# Patient Record
Sex: FEMALE | Race: OTHER | Employment: FULL TIME | URBAN - METROPOLITAN AREA
[De-identification: names, ages, dates, MRNs, and addresses within clinical notes are randomized per-mention and may not be internally consistent; named-entity substitution may affect disease eponyms.]

---

## 2019-04-27 RX ORDER — AZITHROMYCIN 250 MG/1
250 TABLET, FILM COATED ORAL SEE ADMIN INSTRUCTIONS
Qty: 6 TABLET | Refills: 0 | Status: SHIPPED | OUTPATIENT
Start: 2019-04-27 | End: 2019-05-02

## 2019-12-14 ENCOUNTER — OFFICE VISIT (OUTPATIENT)
Dept: FAMILY MEDICINE CLINIC | Age: 44
End: 2019-12-14

## 2019-12-14 VITALS
DIASTOLIC BLOOD PRESSURE: 97 MMHG | WEIGHT: 209 LBS | HEIGHT: 62 IN | BODY MASS INDEX: 38.46 KG/M2 | HEART RATE: 72 BPM | OXYGEN SATURATION: 97 % | TEMPERATURE: 98.1 F | SYSTOLIC BLOOD PRESSURE: 149 MMHG

## 2019-12-14 DIAGNOSIS — S49.91XA INJURY OF RIGHT SHOULDER, INITIAL ENCOUNTER: Primary | ICD-10-CM

## 2019-12-14 PROCEDURE — 99212 OFFICE O/P EST SF 10 MIN: CPT | Performed by: NURSE PRACTITIONER

## 2019-12-14 RX ORDER — CYCLOBENZAPRINE HCL 10 MG
10 TABLET ORAL NIGHTLY PRN
Qty: 10 TABLET | Refills: 0 | Status: SHIPPED | OUTPATIENT
Start: 2019-12-14 | End: 2019-12-24

## 2019-12-14 ASSESSMENT — ENCOUNTER SYMPTOMS
NAUSEA: 0
SORE THROAT: 0
DIARRHEA: 0
ALLERGIC/IMMUNOLOGIC NEGATIVE: 1
EYE DISCHARGE: 0
CHEST TIGHTNESS: 0
EYE ITCHING: 0
COUGH: 0
EYES NEGATIVE: 1
VOMITING: 0
ABDOMINAL PAIN: 0
SHORTNESS OF BREATH: 0

## 2020-01-03 ENCOUNTER — OFFICE VISIT (OUTPATIENT)
Dept: ORTHOPEDIC SURGERY | Age: 45
End: 2020-01-03
Payer: COMMERCIAL

## 2020-01-03 VITALS — BODY MASS INDEX: 38.46 KG/M2 | HEIGHT: 62 IN | WEIGHT: 209 LBS

## 2020-01-03 PROCEDURE — 99203 OFFICE O/P NEW LOW 30 MIN: CPT | Performed by: ORTHOPAEDIC SURGERY

## 2020-01-03 NOTE — PROGRESS NOTES
Orthopedic Shoulder Encounter Note     Chief complaint: Right shoulder pain    HPI: Geri Scott is a 53 y.o.  right-hand dominant female who presents for evaluation of her right shoulder which is been hurting for the past 2 weeks. She believes that on 12/13/2019 she hurt the shoulder while she was trying to load up a wooden table onto the back of a truck. She states that the table in particular was pretty heavy. She attempted to lift this table over her shoulder and felt a painful crunch in the shoulder with immediate pain. She had difficulty lifting up her arm initially and has been working on this on her own at home with some improvement but still has pain lifting up her arm and also reaching up to her back. Her pain diffusely involves the shoulder. 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 Her pain is particularly bothersome at nighttime. Of note she does report a history of a left shoulder rotator cuff tear but apparently was not fixed at the time because she was dealing with a frozen shoulder. It is not clear as to whether or not she did in fact have a full-thickness tear involving the rotator cuff. Previous treatment:    NSAIDs: None    Physical Therapy:  No    Injections: None    Surgeries: None    Review of Systems:     Constitution: no fever or chills   Pain level: 6/10  Musculoskeletal: As noted in the HPI   Neurologic: no neurologic symptoms    Past Medical History  Luis Miguel Gray  has a past medical history of Anxiety, Lumbar disc disease, and Rotator cuff tear. Past Surgical History  Luis Miguel Gray  has a past surgical history that includes Lumbar disc surgery. Current Medications  Current Outpatient Medications   Medication Sig Dispense Refill    naproxen (NAPROSYN) 500 MG tablet Take 1 tablet by mouth 2 times daily (with meals) for 14 days.  (Patient not taking: Reported on 12/14/2019) 28 tablet 0    Multiple Vitamins-Minerals (MULTIVITAMIN PO) Take 1 tablet by mouth daily. No current facility-administered medications for this visit. Allergies  Allergies have been reviewed. Sol Dumont is allergic to bactrim [sulfamethoxazole-trimethoprim]; ibuprofen; and other. Social History  Annalisa  reports that she has never smoked. She has never used smokeless tobacco. She reports current alcohol use. She reports that she does not use drugs. Family History  Annalisa's family history includes Cancer (age of onset: 72) in her maternal grandmother; Diabetes in her paternal grandmother; Diabetes (age of onset: 6) in her brother; Diabetes (age of onset: 5) in her sister; High Blood Pressure in her mother; High Cholesterol in her father.      Physical Exam:     Ht 5' 2\" (1.575 m)   Wt 209 lb (94.8 kg)   BMI 38.23 kg/m²    General Appearance: alert, well appearing, and in no distress  Mental Status: alert, oriented to person, place, and time  Gait: normal    Shoulder:    Skin: warm and dry, no rash or erythema; no swelling or obvious muscular atrophy  Vasculature: 2+ radial pulses bilaterally  Neuro: Sensation grossly intact to light touch diffusely  Tenderness: Tender to palpation over the anterior and anterolateral aspect of the right shoulder    ROM: (Degrees)    Right   A P   Left   A P    Elevation  105    Elevation  125   Abduction  75    Abduction  140    ER   40    ER   60   IR   L5    IR   T12   90 abd/ER      90 abd/ER     90 abd/IR      90 abd/IR     Crepitation  No    Crepitation  No  Dyskenesia  No    Dyskenesia  No      Muscle strength:    Right       Left    Deltoid   5    Deltoid   5  Supraspinatus  4    Supraspinatus  5  ER   5    ER   5  IR   5    IR   5    Special tests    Right   Rotator Cuff    Left    y   Painful arc    n   y   Pain with ER    n    y   Neer's     n    y   Hawkin's    n    n   Drop Arm    n  n   Lift off/Belly Press   n  n   ER Lag    n          [facility] Joint  n   AC tenderness   n  n   Cross-chest

## 2020-01-09 ENCOUNTER — HOSPITAL ENCOUNTER (OUTPATIENT)
Dept: MRI IMAGING | Age: 45
Discharge: HOME | End: 2020-01-11
Payer: COMMERCIAL

## 2020-01-09 PROCEDURE — 73221 MRI JOINT UPR EXTREM W/O DYE: CPT

## 2020-01-10 ENCOUNTER — TELEPHONE (OUTPATIENT)
Dept: ORTHOPEDIC SURGERY | Age: 45
End: 2020-01-10

## 2020-01-13 ENCOUNTER — TELEPHONE (OUTPATIENT)
Dept: ORTHOPEDIC SURGERY | Age: 45
End: 2020-01-13

## 2020-01-14 NOTE — TELEPHONE ENCOUNTER
Please inform Ms. Claire I've reviewed her MRI study. It demonstrates a full thickness RTC tear. I'd like to see her back to discuss treatment including surgery.

## 2020-01-20 ENCOUNTER — OFFICE VISIT (OUTPATIENT)
Dept: ORTHOPEDIC SURGERY | Age: 45
End: 2020-01-20
Payer: COMMERCIAL

## 2020-01-20 VITALS — WEIGHT: 209 LBS | HEIGHT: 62 IN | BODY MASS INDEX: 38.46 KG/M2

## 2020-01-20 PROBLEM — M75.21 RIGHT BICIPITAL TENOSYNOVITIS: Status: ACTIVE | Noted: 2020-01-20

## 2020-01-20 PROCEDURE — 99213 OFFICE O/P EST LOW 20 MIN: CPT | Performed by: ORTHOPAEDIC SURGERY

## 2020-01-20 NOTE — PROGRESS NOTES
HPI: Ms. Luisa Patterson is here today to review the results of her right shoulder MRI study which was completed on 1/9/2020. I did review the images with the patient today which demonstrates a full-thickness tear of the supraspinatus with retraction to about the level of the mid humeral head. There is some thinning of the rotator cuff as you go posteriorly into the supraspinatus tendon with some tendinopathy. There is also thickening of the biceps tendon within the groove associated with surrounding edema. There is irregularity involving the proximal subscapularis suggestive of possible tear. I had a discussion with the patient today with regards to the nature and extension of her injury. I did educate her on the natural history of a full-thickness rotator cuff tear. We discussed treatment options including nonoperative and operative intervention. Given the patient's age and activity level she is inclined to forego attempts at conservative management and proceed with surgical intervention by way of an arthroscopic rotator cuff repair with possible biceps tenodesis. We discussed the possibility of performing this subpectorally if necessary. We discussed the d Risks as discussed included but were not limited to risk of infection, wound healing problems, stiffness, progressive arthritis, persistent pain, re-tear of the rotator cuff, failure of the rotator cuff repair to heal, suture and/or anchor related problems, vascular injury, excessive bleeding, temporary and/or permanent nerve injury, medical risks including DVT, PE, and stroke, and risk of anesthesia. She demonstrated a good understanding of our discussion and at this time would like to proceed with surgical intervention as outlined above. We will facilitate her getting cleared for surgery preoperatively and we'll schedule her for surgery at her convenience.   All questions were appropriately answered however she was encouraged to return or call prior to surgery or any other upcoming appointments with additional questions and/or concerns. I have spent 15 minutes face-to-face with the patient more than 50% of this time was spent counseling and coordinating care as outlined above.

## 2020-01-22 ENCOUNTER — OFFICE VISIT (OUTPATIENT)
Dept: INTERNAL MEDICINE CLINIC | Age: 45
End: 2020-01-22
Payer: COMMERCIAL

## 2020-01-22 VITALS
DIASTOLIC BLOOD PRESSURE: 74 MMHG | WEIGHT: 210 LBS | HEIGHT: 62 IN | OXYGEN SATURATION: 98 % | SYSTOLIC BLOOD PRESSURE: 122 MMHG | BODY MASS INDEX: 38.64 KG/M2 | HEART RATE: 72 BPM

## 2020-01-22 PROBLEM — Z12.39 BREAST SCREENING: Status: ACTIVE | Noted: 2020-01-22

## 2020-01-22 PROBLEM — E66.9 OBESITY (BMI 30-39.9): Status: ACTIVE | Noted: 2020-01-22

## 2020-01-22 PROCEDURE — 99204 OFFICE O/P NEW MOD 45 MIN: CPT | Performed by: INTERNAL MEDICINE

## 2020-01-22 ASSESSMENT — PATIENT HEALTH QUESTIONNAIRE - PHQ9
SUM OF ALL RESPONSES TO PHQ9 QUESTIONS 1 & 2: 0
1. LITTLE INTEREST OR PLEASURE IN DOING THINGS: 0
SUM OF ALL RESPONSES TO PHQ QUESTIONS 1-9: 0
2. FEELING DOWN, DEPRESSED OR HOPELESS: 0
SUM OF ALL RESPONSES TO PHQ QUESTIONS 1-9: 0

## 2020-01-22 ASSESSMENT — ENCOUNTER SYMPTOMS
TROUBLE SWALLOWING: 0
COLOR CHANGE: 0
WHEEZING: 0
EYE DISCHARGE: 0
SHORTNESS OF BREATH: 0
COUGH: 0
BLOOD IN STOOL: 0
DIARRHEA: 0
ABDOMINAL DISTENTION: 0
EYE PAIN: 0

## 2020-01-22 NOTE — PROGRESS NOTES
Hematological: Negative for adenopathy. Does not bruise/bleed easily. Psychiatric/Behavioral: Negative for behavioral problems and sleep disturbance. Objective:     Physical Exam  Constitutional:       Appearance: She is well-developed. She is not diaphoretic. Comments: obese   HENT:      Head: Normocephalic and atraumatic. Eyes:      General:         Right eye: No discharge. Left eye: No discharge. Extraocular Movements:      Right eye: Normal extraocular motion. Left eye: Normal extraocular motion. Conjunctiva/sclera: Conjunctivae normal.      Right eye: Right conjunctiva is not injected. Left eye: Left conjunctiva is not injected. Neck:      Musculoskeletal: Normal range of motion and neck supple. No edema or erythema. Thyroid: No thyroid mass or thyromegaly. Vascular: No JVD. Cardiovascular:      Rate and Rhythm: Normal rate and regular rhythm. Heart sounds: No murmur. No friction rub. Pulmonary:      Effort: Pulmonary effort is normal. No tachypnea, bradypnea, accessory muscle usage or respiratory distress. Breath sounds: Normal breath sounds. No wheezing or rales. Abdominal:      General: Bowel sounds are normal. There is no distension. Palpations: Abdomen is soft. Tenderness: There is no tenderness. There is no rebound. Musculoskeletal: Normal range of motion. General: No tenderness. Lymphadenopathy:      Head:      Right side of head: No submental or submandibular adenopathy. Left side of head: No submental or submandibular adenopathy. Cervical: No cervical adenopathy. Skin:     General: Skin is warm. Coloration: Skin is not pale. Findings: No bruising, ecchymosis or rash. Neurological:      Mental Status: She is alert and oriented to person, place, and time. Cranial Nerves: No cranial nerve deficit. Sensory: No sensory deficit. Motor: No atrophy or abnormal muscle tone. Coordination: Coordination normal.   Psychiatric:         Mood and Affect: Mood is not anxious. Affect is not angry. Speech: Speech is not slurred. Behavior: Behavior normal. Behavior is not aggressive. Thought Content: Thought content does not include homicidal ideation. Cognition and Memory: Memory is not impaired. /74   Pulse 72   Ht 5' 2.01\" (1.575 m)   Wt 210 lb (95.3 kg)   SpO2 98%   BMI 38.40 kg/m²     Assessment:          Diagnosis Orders   1. Injury of right rotator cuff, initial encounter     2. Screening for hyperlipidemia  Lipid, Fasting   3. Encounter for screening for diabetes mellitus  Glucose, Fasting    Hemoglobin A1C - NOT COVERED /DO NOT USE FOR MEDICARE PATIENTS   4. Breast screening               Plan:      No follow-ups on file. No orders of the defined types were placed in this encounter. No orders of the defined types were placed in this encounter. clear for surg intermediate risk  For right shoulder repair     Patient given educational materials - see patient instructions. Discussed use, benefit, and side effects of prescribed medications. All patientquestions answered. Pt voiced understanding. Reviewed health maintenance. Instructedto continue current medications, diet and exercise. Patient agreed with treatmentplan. Follow up as directed. Electronically signed by Wolf Daniels MD on 1/22/2020 at 12:03 PM we last saw you? No  Have you had your routine dental cleaning in the past 6 months? yes -     Have you activated your [employer] account? If not, what are your barriers?  Yes     Patient Care Team:  Wolf Daniels MD as PCP - General (Internal Medicine)    Medical History Review  Past Medical, Family, and Social History reviewed and does not contribute to the patient presenting condition    Health Maintenance   Topic Date Due    DTaP/Tdap/Td vaccine (1 - Tdap) 06/10/1984    HIV screen  06/10/1988    Cervical cancer

## 2020-01-24 ENCOUNTER — HOSPITAL ENCOUNTER (OUTPATIENT)
Age: 45
Discharge: HOME | End: 2020-01-24
Payer: COMMERCIAL

## 2020-01-24 ENCOUNTER — HOSPITAL ENCOUNTER (OUTPATIENT)
Dept: PREADMISSION TESTING | Age: 45
End: 2020-01-24
Payer: COMMERCIAL

## 2020-01-24 LAB
ABSOLUTE EOS #: 0.1 K/UL (ref 0–0.4)
ABSOLUTE IMMATURE GRANULOCYTE: ABNORMAL K/UL (ref 0–0.3)
ABSOLUTE LYMPH #: 1.8 K/UL (ref 1–4.8)
ABSOLUTE MONO #: 0.7 K/UL (ref 0.1–1.3)
ALBUMIN SERPL-MCNC: 4 G/DL (ref 3.5–5.2)
ALBUMIN/GLOBULIN RATIO: ABNORMAL (ref 1–2.5)
ALP BLD-CCNC: 49 U/L (ref 35–104)
ALT SERPL-CCNC: 31 U/L (ref 5–33)
ANION GAP SERPL CALCULATED.3IONS-SCNC: 11 MMOL/L (ref 9–17)
AST SERPL-CCNC: 23 U/L
BASOPHILS # BLD: 1 % (ref 0–2)
BASOPHILS ABSOLUTE: 0.1 K/UL (ref 0–0.2)
BILIRUB SERPL-MCNC: 0.22 MG/DL (ref 0.3–1.2)
BUN BLDV-MCNC: 13 MG/DL (ref 6–20)
BUN/CREAT BLD: ABNORMAL (ref 9–20)
CALCIUM SERPL-MCNC: 9.3 MG/DL (ref 8.6–10.4)
CHLORIDE BLD-SCNC: 104 MMOL/L (ref 98–107)
CHOLESTEROL/HDL RATIO: 3.8
CHOLESTEROL: 160 MG/DL
CO2: 24 MMOL/L (ref 20–31)
CREAT SERPL-MCNC: 0.66 MG/DL (ref 0.5–0.9)
CREATININE URINE: 112.2 MG/DL (ref 28–217)
DIFFERENTIAL TYPE: ABNORMAL
EOSINOPHILS RELATIVE PERCENT: 2 % (ref 0–4)
ESTIMATED AVERAGE GLUCOSE: 111 MG/DL
GFR AFRICAN AMERICAN: >60 ML/MIN
GFR NON-AFRICAN AMERICAN: >60 ML/MIN
GFR SERPL CREATININE-BSD FRML MDRD: ABNORMAL ML/MIN/{1.73_M2}
GFR SERPL CREATININE-BSD FRML MDRD: ABNORMAL ML/MIN/{1.73_M2}
GLUCOSE BLD-MCNC: 121 MG/DL (ref 70–99)
HBA1C MFR BLD: 5.5 % (ref 4–6)
HCT VFR BLD CALC: 41.9 % (ref 36–46)
HDLC SERPL-MCNC: 42 MG/DL
HEMOGLOBIN: 13.7 G/DL (ref 12–16)
IMMATURE GRANULOCYTES: ABNORMAL %
LDL CHOLESTEROL: 93 MG/DL (ref 0–130)
LYMPHOCYTES # BLD: 28 % (ref 24–44)
MCH RBC QN AUTO: 29.6 PG (ref 26–34)
MCHC RBC AUTO-ENTMCNC: 32.6 G/DL (ref 31–37)
MCV RBC AUTO: 90.7 FL (ref 80–100)
MICROALBUMIN/CREAT 24H UR: <12 MG/L
MICROALBUMIN/CREAT UR-RTO: NORMAL MCG/MG CREAT
MONOCYTES # BLD: 10 % (ref 1–7)
NRBC AUTOMATED: ABNORMAL PER 100 WBC
PDW BLD-RTO: 13.8 % (ref 11.5–14.9)
PLATELET # BLD: 293 K/UL (ref 150–450)
PLATELET ESTIMATE: ABNORMAL
PMV BLD AUTO: 8.1 FL (ref 6–12)
POTASSIUM SERPL-SCNC: 4.4 MMOL/L (ref 3.7–5.3)
RBC # BLD: 4.62 M/UL (ref 4–5.2)
RBC # BLD: ABNORMAL 10*6/UL
SEG NEUTROPHILS: 59 % (ref 36–66)
SEGMENTED NEUTROPHILS ABSOLUTE COUNT: 3.8 K/UL (ref 1.3–9.1)
SODIUM BLD-SCNC: 139 MMOL/L (ref 135–144)
TOTAL PROTEIN: 6.8 G/DL (ref 6.4–8.3)
TRIGL SERPL-MCNC: 124 MG/DL
TSH SERPL DL<=0.05 MIU/L-ACNC: 3.05 MIU/L (ref 0.3–5)
VLDLC SERPL CALC-MCNC: NORMAL MG/DL (ref 1–30)
WBC # BLD: 6.5 K/UL (ref 3.5–11)
WBC # BLD: ABNORMAL 10*3/UL

## 2020-01-24 PROCEDURE — 82570 ASSAY OF URINE CREATININE: CPT

## 2020-01-24 PROCEDURE — 83036 HEMOGLOBIN GLYCOSYLATED A1C: CPT

## 2020-01-24 PROCEDURE — 85025 COMPLETE CBC W/AUTO DIFF WBC: CPT

## 2020-01-24 PROCEDURE — 80053 COMPREHEN METABOLIC PANEL: CPT

## 2020-01-24 PROCEDURE — 82043 UR ALBUMIN QUANTITATIVE: CPT

## 2020-01-24 PROCEDURE — 80061 LIPID PANEL: CPT

## 2020-01-24 PROCEDURE — 84443 ASSAY THYROID STIM HORMONE: CPT

## 2020-01-24 PROCEDURE — 36415 COLL VENOUS BLD VENIPUNCTURE: CPT

## 2020-01-31 ENCOUNTER — HOSPITAL ENCOUNTER (OUTPATIENT)
Dept: PREADMISSION TESTING | Age: 45
Discharge: HOME | End: 2020-02-04
Payer: COMMERCIAL

## 2020-01-31 ENCOUNTER — OFFICE VISIT (OUTPATIENT)
Dept: ORTHOPEDIC SURGERY | Age: 45
End: 2020-01-31
Payer: COMMERCIAL

## 2020-01-31 VITALS
SYSTOLIC BLOOD PRESSURE: 138 MMHG | HEART RATE: 88 BPM | TEMPERATURE: 98 F | OXYGEN SATURATION: 99 % | RESPIRATION RATE: 20 BRPM | BODY MASS INDEX: 38.46 KG/M2 | HEIGHT: 62 IN | DIASTOLIC BLOOD PRESSURE: 91 MMHG | WEIGHT: 209 LBS

## 2020-01-31 VITALS — WEIGHT: 210 LBS | HEIGHT: 62 IN | BODY MASS INDEX: 38.64 KG/M2

## 2020-01-31 PROCEDURE — 99212 OFFICE O/P EST SF 10 MIN: CPT | Performed by: ORTHOPAEDIC SURGERY

## 2020-01-31 RX ORDER — ONDANSETRON 4 MG/1
4 TABLET, FILM COATED ORAL DAILY PRN
Qty: 20 TABLET | Refills: 0 | Status: SHIPPED | OUTPATIENT
Start: 2020-01-31 | End: 2020-06-24 | Stop reason: ALTCHOICE

## 2020-01-31 RX ORDER — SODIUM CHLORIDE 0.9 % (FLUSH) 0.9 %
10 SYRINGE (ML) INJECTION PRN
Status: CANCELLED | OUTPATIENT
Start: 2020-01-31

## 2020-01-31 RX ORDER — ACETAMINOPHEN 325 MG/1
1000 TABLET ORAL ONCE
Status: CANCELLED | OUTPATIENT
Start: 2020-01-31 | End: 2020-01-31

## 2020-01-31 RX ORDER — GABAPENTIN 300 MG/1
CAPSULE ORAL
Qty: 7 CAPSULE | Refills: 0 | Status: SHIPPED | OUTPATIENT
Start: 2020-01-31 | End: 2020-06-24

## 2020-01-31 RX ORDER — SODIUM CHLORIDE 0.9 % (FLUSH) 0.9 %
10 SYRINGE (ML) INJECTION EVERY 12 HOURS SCHEDULED
Status: CANCELLED | OUTPATIENT
Start: 2020-01-31

## 2020-01-31 RX ORDER — HYDROCODONE BITARTRATE AND ACETAMINOPHEN 5; 325 MG/1; MG/1
1 TABLET ORAL EVERY 4 HOURS
Qty: 42 TABLET | Refills: 0 | Status: SHIPPED | OUTPATIENT
Start: 2020-01-31 | End: 2020-02-13 | Stop reason: SDUPTHER

## 2020-01-31 NOTE — PROGRESS NOTES
HPI: Ms. Daysi Hernandez is here today for her preoperative visit regarding the right shoulder. She has a full-thickness tear of her supraspinatus and what appears to be a tear involving the subscapularis as well. She has significant pain and dysfunction involving the shoulder and has elected to forego attempts at conservative management proceeding with surgical intervention by way of an arthroscopic rotator cuff repair with possible biceps tenodesis. We once again discussed in detail what surgery would entail in terms of the procedure, postoperative recovery course, and anticipated outcome. She was provided with a prescription for postoperative analgesics. All questions were appropriately answered. She has seen her primary care provider who provided appropriate preoperative medical clearance.

## 2020-01-31 NOTE — H&P
HISTORY and [facility] [address]       NAME:  Abigail Mahmood  MRN: [id number]   YOB: 1975   Date: 1/31/2020   Age: 53 y.o. Gender: female       COMPLAINT AND PRESENT HISTORY:     Abigail Mahmood is 53 y.o.,  female, undergoing preadmission testing for right shoulder rotator cuff tear with scheduled for right shoulder arthroscopy rotator cuff repair possible subpectoral bicep tenodesis with interscalene block and exparel on 02/05/2020. Pt was moving a dining room table on December 13, 2019 and heard a pop, immediately felt pain in right shoulder. She fell down to bed of truck onto her hands which aggravated right shoulder pain further. Pt rates pain 7-8/10 during the day and 10/10 at night. She reports inability to sleep due to pain. Does not take any pain medications to relieve pain. Uses alternating ice/heat with mild relief of pain. There is a cracking/popping with movement. Range of motion to right shoulder is limited due to pain. Right shoulder abduction to 75 degrees, external rotation to 40. No weakness to right arm or  strength. No recent falls or trauma since injury occurred. No redness, rash or swelling. No Hx of MRSA infections in the past.    Denies any current chest pain/pressure, palpitations, SOB, recent URI, nausea, vomiting, diarrhea, constipation, fever or chills. EXAMINATION:  MRI OF THE RIGHT SHOULDER WITHOUT CONTRAST   1/9/2020 5:07 pm  Impression  Rupture of the supraspinatus tendon with approximately 1.5 cm of retraction. High-grade tendinopathy and multifocal intrasubstance tears of the  infraspinatus, subscapularis, and intra-articular biceps tendon.       PAST MEDICAL HISTORY     Past Medical History:   Diagnosis Date    Anxiety 5/3/2013    NO MED STOPPED ZOLOFT DID NOT LIKE SIDE EFFECTS TO FOLLOW WITH DR FOR ALTERNATE MED    Lumbar disc disease     Obesity (BMI 30-39.9) 1/22/2020    Rotator cuff tear 01/19/2013    TO HAVE SURGERY Concern    None   Social History Narrative    None        REVIEW OF SYSTEMS      Allergies   Allergen Reactions    Bactrim [Sulfamethoxazole-Trimethoprim]      anaphalyaxis    Ibuprofen Itching    Other      Ocean perch  What ever they put on it to preserve it        Current Outpatient Medications on File Prior to Encounter   Medication Sig Dispense Refill    HYDROcodone-acetaminophen (NORCO) 5-325 MG per tablet Take 1 tablet by mouth every 4 hours for 42 doses. 42 tablet 0    ondansetron (ZOFRAN) 4 MG tablet Take 1 tablet by mouth daily as needed for Nausea or Vomiting 20 tablet 0    gabapentin (NEURONTIN) 300 MG capsule Take 1 tab, Daily at night 7 capsule 0     No current facility-administered medications on file prior to encounter. General health:  Fairly good. No fever or chills. Skin:  No itching, redness or rash. HEENT:  No headache, epistaxis or sore throat. Neck:  No pain, stiffness or masses. Cardiovascular/Respiratory system:  No chest pain, palpitation or shortness of breath. Gastrointestinal tract: No abdominal pain, Dysphagia, nausea, vomiting, diarrhea or constipation. Genitourinary:  No burning on micturition. No hesitancy, urgency, frequency or discoloration of urine. Locomotor:  See HPI. Neuropsychiatric:  No referable complaints. GENERAL PHYSICAL EXAM:     Vitals: BP (!) 138/91   Pulse 88   Temp 98 °F (36.7 °C)   Resp 20   Ht 5' 2\" (1.575 m)   Wt 209 lb (94.8 kg)   LMP 01/03/2020 (Approximate)   SpO2 99%   Breastfeeding No   BMI 38.23 kg/m²  Body mass index is 38.23 kg/m². GENERAL APPEARANCE:   Maninder Hdz is 53 y.o.,  female, moderately obese, nourished, conscious, alert. Does not appear to be distress or pain at this time. SKIN:  Warm, dry, no cyanosis or jaundice. HEAD:  Normocephalic, atraumatic, no swelling or tenderness. EYES:  Pupils equal, reactive to light. EARS:  No discharge, no marked hearing loss. NOSE:  No rhinorrhea, epistaxis or septal deformity. THROAT:  Not congested. No ulceration bleeding or discharge. NECK:  No stiffness, trachea central.  No palpable masses or L.N.                 CHEST:  Symmetrical and equal on expansion. HEART:  RRR S1 > S2. No audible murmurs or gallops. LUNGS:  Equal on expansion, normal breath sounds. No adventitious sounds. ABDOMEN:  Obese. Soft on palpation. No localized tenderness. No guarding or rigidity. No palpable hepatosplenomegaly. LYMPHATICS:  No palpable cervical lymphadenopathy. LOCOMOTOR, BACK AND SPINE:  No deformities. EXTREMITIES:  Symmetrical, no pretibial edema. No calf tenderness. No discoloration or ulcerations. Right shoulder tenderness to palpation, +crepitus, limited range of motion due to pain, abduction to 75 degrees, ER to 40, radial pulses +2 bilaterally. NEUROLOGIC:  The patient is conscious, alert, oriented, Cranial nerve II-XII intact, taste and smell were not examined. No apparent focal sensory or motor deficits.                                                                                      PROVISIONAL DIAGNOSES / SURGERY:      ROTATOR CUFF TEAR RIGHT SHOULDER    SHOULDER ARTHROSCOPY ROTATOR CUFF REPAIR POSSIBLE SUBPECTORAL BICEP TENODESIS W/INTERSCALENE BLOCK & EXPAREL Right      Patient Active Problem List    Diagnosis Date Noted    Breast screening 01/22/2020    Obesity (BMI 30-39.9) 01/22/2020    Right bicipital tenosynovitis 01/20/2020    Anxiety 05/03/2013    Complete tear of right rotator cuff 05/03/2013    Benign positional vertigo 03/01/2013    Acute bronchitis 03/01/2013    Left shoulder pain 03/01/2013    Injury of left shoulder

## 2020-02-03 ENCOUNTER — ANESTHESIA EVENT (OUTPATIENT)
Dept: OPERATING ROOM | Age: 45
End: 2020-02-03
Payer: COMMERCIAL

## 2020-02-03 RX ORDER — LIDOCAINE HYDROCHLORIDE 10 MG/ML
1 INJECTION, SOLUTION EPIDURAL; INFILTRATION; INTRACAUDAL; PERINEURAL
Status: CANCELLED | OUTPATIENT
Start: 2020-02-03 | End: 2020-02-03

## 2020-02-03 RX ORDER — SODIUM CHLORIDE, SODIUM LACTATE, POTASSIUM CHLORIDE, CALCIUM CHLORIDE 600; 310; 30; 20 MG/100ML; MG/100ML; MG/100ML; MG/100ML
INJECTION, SOLUTION INTRAVENOUS CONTINUOUS
Status: CANCELLED | OUTPATIENT
Start: 2020-02-03

## 2020-02-03 RX ORDER — SODIUM CHLORIDE 0.9 % (FLUSH) 0.9 %
10 SYRINGE (ML) INJECTION EVERY 12 HOURS SCHEDULED
Status: CANCELLED | OUTPATIENT
Start: 2020-02-03

## 2020-02-03 RX ORDER — SODIUM CHLORIDE 0.9 % (FLUSH) 0.9 %
10 SYRINGE (ML) INJECTION PRN
Status: CANCELLED | OUTPATIENT
Start: 2020-02-03

## 2020-02-05 ENCOUNTER — HOSPITAL ENCOUNTER (OUTPATIENT)
Age: 45
Setting detail: OUTPATIENT SURGERY
Discharge: HOME | End: 2020-02-05
Attending: ORTHOPAEDIC SURGERY | Admitting: ORTHOPAEDIC SURGERY
Payer: COMMERCIAL

## 2020-02-05 ENCOUNTER — ANESTHESIA (OUTPATIENT)
Dept: OPERATING ROOM | Age: 45
End: 2020-02-05
Payer: COMMERCIAL

## 2020-02-05 VITALS
DIASTOLIC BLOOD PRESSURE: 71 MMHG | RESPIRATION RATE: 19 BRPM | HEART RATE: 55 BPM | TEMPERATURE: 98.1 F | BODY MASS INDEX: 38.46 KG/M2 | WEIGHT: 209 LBS | HEIGHT: 62 IN | SYSTOLIC BLOOD PRESSURE: 131 MMHG | OXYGEN SATURATION: 98 %

## 2020-02-05 VITALS — TEMPERATURE: 97.9 F | OXYGEN SATURATION: 100 % | DIASTOLIC BLOOD PRESSURE: 81 MMHG | SYSTOLIC BLOOD PRESSURE: 128 MMHG

## 2020-02-05 LAB
-: NORMAL
HCG, PREGNANCY URINE (POC): NEGATIVE

## 2020-02-05 PROCEDURE — 6360000002 HC RX W HCPCS: Performed by: NURSE ANESTHETIST, CERTIFIED REGISTERED

## 2020-02-05 PROCEDURE — 2500000003 HC RX 250 WO HCPCS: Performed by: ANESTHESIOLOGY

## 2020-02-05 PROCEDURE — 2580000003 HC RX 258: Performed by: ANESTHESIOLOGY

## 2020-02-05 PROCEDURE — 6360000002 HC RX W HCPCS: Performed by: ANESTHESIOLOGY

## 2020-02-05 PROCEDURE — 6370000000 HC RX 637 (ALT 250 FOR IP): Performed by: ORTHOPAEDIC SURGERY

## 2020-02-05 PROCEDURE — 6370000000 HC RX 637 (ALT 250 FOR IP): Performed by: ANESTHESIOLOGY

## 2020-02-05 PROCEDURE — C9290 INJ, BUPIVACAINE LIPOSOME: HCPCS | Performed by: ANESTHESIOLOGY

## 2020-02-05 PROCEDURE — 2720000010 HC SURG SUPPLY STERILE: Performed by: ORTHOPAEDIC SURGERY

## 2020-02-05 PROCEDURE — 64415 NJX AA&/STRD BRCH PLXS IMG: CPT | Performed by: ANESTHESIOLOGY

## 2020-02-05 PROCEDURE — 3600000003 HC SURGERY LEVEL 3 BASE: Performed by: ORTHOPAEDIC SURGERY

## 2020-02-05 PROCEDURE — 6360000002 HC RX W HCPCS: Performed by: ORTHOPAEDIC SURGERY

## 2020-02-05 PROCEDURE — 3600000004 HC SURGERY LEVEL 4 BASE: Performed by: ORTHOPAEDIC SURGERY

## 2020-02-05 PROCEDURE — 3700000001 HC ADD 15 MINUTES (ANESTHESIA): Performed by: ORTHOPAEDIC SURGERY

## 2020-02-05 PROCEDURE — 7100000000 HC PACU RECOVERY - FIRST 15 MIN: Performed by: ORTHOPAEDIC SURGERY

## 2020-02-05 PROCEDURE — 3600000013 HC SURGERY LEVEL 3 ADDTL 15MIN: Performed by: ORTHOPAEDIC SURGERY

## 2020-02-05 PROCEDURE — 81025 URINE PREGNANCY TEST: CPT

## 2020-02-05 PROCEDURE — 7100000001 HC PACU RECOVERY - ADDTL 15 MIN: Performed by: ORTHOPAEDIC SURGERY

## 2020-02-05 PROCEDURE — 3600000014 HC SURGERY LEVEL 4 ADDTL 15MIN: Performed by: ORTHOPAEDIC SURGERY

## 2020-02-05 PROCEDURE — 29827 SHO ARTHRS SRG RT8TR CUF RPR: CPT | Performed by: ORTHOPAEDIC SURGERY

## 2020-02-05 PROCEDURE — 2709999900 HC NON-CHARGEABLE SUPPLY: Performed by: ORTHOPAEDIC SURGERY

## 2020-02-05 PROCEDURE — 3700000000 HC ANESTHESIA ATTENDED CARE: Performed by: ORTHOPAEDIC SURGERY

## 2020-02-05 PROCEDURE — C1713 ANCHOR/SCREW BN/BN,TIS/BN: HCPCS | Performed by: ORTHOPAEDIC SURGERY

## 2020-02-05 PROCEDURE — 29828 SHO ARTHRS SRG BICP TENODSIS: CPT | Performed by: ORTHOPAEDIC SURGERY

## 2020-02-05 PROCEDURE — L3650 SO 8 ABD RESTRAINT PRE OTS: HCPCS | Performed by: ORTHOPAEDIC SURGERY

## 2020-02-05 DEVICE — ANCHOR SUT L24.5MM DIA4.75MM BIOCOMPOSITE SELF PUNCHING: Type: IMPLANTABLE DEVICE | Site: SHOULDER | Status: FUNCTIONAL

## 2020-02-05 DEVICE — ANCHOR SUT L14.7MM DIA5.5MM BIOCOMPOSITE FULL THRD W/ 1.3MM: Type: IMPLANTABLE DEVICE | Site: SHOULDER | Status: FUNCTIONAL

## 2020-02-05 RX ORDER — LIDOCAINE HYDROCHLORIDE 20 MG/ML
INJECTION, SOLUTION INFILTRATION; PERINEURAL
Status: COMPLETED | OUTPATIENT
Start: 2020-02-05 | End: 2020-02-05

## 2020-02-05 RX ORDER — ROPIVACAINE HYDROCHLORIDE 5 MG/ML
INJECTION, SOLUTION EPIDURAL; INFILTRATION; PERINEURAL
Status: COMPLETED | OUTPATIENT
Start: 2020-02-05 | End: 2020-02-05

## 2020-02-05 RX ORDER — ONDANSETRON 2 MG/ML
4 INJECTION INTRAMUSCULAR; INTRAVENOUS
Status: DISCONTINUED | OUTPATIENT
Start: 2020-02-05 | End: 2020-02-05 | Stop reason: HOSPADM

## 2020-02-05 RX ORDER — ONDANSETRON 2 MG/ML
INJECTION INTRAMUSCULAR; INTRAVENOUS PRN
Status: DISCONTINUED | OUTPATIENT
Start: 2020-02-05 | End: 2020-02-05 | Stop reason: SDUPTHER

## 2020-02-05 RX ORDER — PROPOFOL 10 MG/ML
INJECTION, EMULSION INTRAVENOUS PRN
Status: DISCONTINUED | OUTPATIENT
Start: 2020-02-05 | End: 2020-02-05 | Stop reason: SDUPTHER

## 2020-02-05 RX ORDER — SODIUM CHLORIDE 0.9 % (FLUSH) 0.9 %
10 SYRINGE (ML) INJECTION PRN
Status: DISCONTINUED | OUTPATIENT
Start: 2020-02-05 | End: 2020-02-05 | Stop reason: HOSPADM

## 2020-02-05 RX ORDER — DIPHENHYDRAMINE HYDROCHLORIDE 50 MG/ML
12.5 INJECTION INTRAMUSCULAR; INTRAVENOUS
Status: DISCONTINUED | OUTPATIENT
Start: 2020-02-05 | End: 2020-02-05 | Stop reason: HOSPADM

## 2020-02-05 RX ORDER — ROCURONIUM BROMIDE 10 MG/ML
INJECTION, SOLUTION INTRAVENOUS PRN
Status: DISCONTINUED | OUTPATIENT
Start: 2020-02-05 | End: 2020-02-05 | Stop reason: SDUPTHER

## 2020-02-05 RX ORDER — GLYCOPYRROLATE 1 MG/5 ML
SYRINGE (ML) INTRAVENOUS PRN
Status: DISCONTINUED | OUTPATIENT
Start: 2020-02-05 | End: 2020-02-05 | Stop reason: SDUPTHER

## 2020-02-05 RX ORDER — NEOSTIGMINE METHYLSULFATE 5 MG/5 ML
SYRINGE (ML) INTRAVENOUS PRN
Status: DISCONTINUED | OUTPATIENT
Start: 2020-02-05 | End: 2020-02-05 | Stop reason: SDUPTHER

## 2020-02-05 RX ORDER — SODIUM CHLORIDE, SODIUM LACTATE, POTASSIUM CHLORIDE, CALCIUM CHLORIDE 600; 310; 30; 20 MG/100ML; MG/100ML; MG/100ML; MG/100ML
INJECTION, SOLUTION INTRAVENOUS CONTINUOUS
Status: DISCONTINUED | OUTPATIENT
Start: 2020-02-05 | End: 2020-02-05 | Stop reason: HOSPADM

## 2020-02-05 RX ORDER — MEPERIDINE HYDROCHLORIDE 25 MG/ML
12.5 INJECTION INTRAMUSCULAR; INTRAVENOUS; SUBCUTANEOUS EVERY 5 MIN PRN
Status: DISCONTINUED | OUTPATIENT
Start: 2020-02-05 | End: 2020-02-05 | Stop reason: HOSPADM

## 2020-02-05 RX ORDER — OXYCODONE HYDROCHLORIDE AND ACETAMINOPHEN 5; 325 MG/1; MG/1
1 TABLET ORAL ONCE
Status: COMPLETED | OUTPATIENT
Start: 2020-02-05 | End: 2020-02-05

## 2020-02-05 RX ORDER — MIDAZOLAM HYDROCHLORIDE 1 MG/ML
INJECTION INTRAMUSCULAR; INTRAVENOUS PRN
Status: DISCONTINUED | OUTPATIENT
Start: 2020-02-05 | End: 2020-02-05 | Stop reason: SDUPTHER

## 2020-02-05 RX ORDER — LIDOCAINE HYDROCHLORIDE 10 MG/ML
1 INJECTION, SOLUTION EPIDURAL; INFILTRATION; INTRACAUDAL; PERINEURAL
Status: DISCONTINUED | OUTPATIENT
Start: 2020-02-05 | End: 2020-02-05 | Stop reason: HOSPADM

## 2020-02-05 RX ORDER — LIDOCAINE HYDROCHLORIDE 10 MG/ML
INJECTION, SOLUTION EPIDURAL; INFILTRATION; INTRACAUDAL; PERINEURAL PRN
Status: DISCONTINUED | OUTPATIENT
Start: 2020-02-05 | End: 2020-02-05 | Stop reason: SDUPTHER

## 2020-02-05 RX ORDER — SODIUM CHLORIDE 0.9 % (FLUSH) 0.9 %
10 SYRINGE (ML) INJECTION EVERY 12 HOURS SCHEDULED
Status: DISCONTINUED | OUTPATIENT
Start: 2020-02-05 | End: 2020-02-05 | Stop reason: HOSPADM

## 2020-02-05 RX ORDER — BUPIVACAINE HYDROCHLORIDE 5 MG/ML
INJECTION, SOLUTION EPIDURAL; INTRACAUDAL
Status: DISCONTINUED | OUTPATIENT
Start: 2020-02-05 | End: 2020-02-05 | Stop reason: SDUPTHER

## 2020-02-05 RX ORDER — LABETALOL 20 MG/4 ML (5 MG/ML) INTRAVENOUS SYRINGE
5 EVERY 10 MIN PRN
Status: DISCONTINUED | OUTPATIENT
Start: 2020-02-05 | End: 2020-02-05 | Stop reason: HOSPADM

## 2020-02-05 RX ORDER — DEXAMETHASONE SODIUM PHOSPHATE 10 MG/ML
10 INJECTION, SOLUTION INTRAMUSCULAR; INTRAVENOUS ONCE
Status: COMPLETED | OUTPATIENT
Start: 2020-02-05 | End: 2020-02-05

## 2020-02-05 RX ORDER — MORPHINE SULFATE 2 MG/ML
2 INJECTION, SOLUTION INTRAMUSCULAR; INTRAVENOUS EVERY 5 MIN PRN
Status: DISCONTINUED | OUTPATIENT
Start: 2020-02-05 | End: 2020-02-05 | Stop reason: HOSPADM

## 2020-02-05 RX ORDER — LIDOCAINE HYDROCHLORIDE 10 MG/ML
INJECTION, SOLUTION INFILTRATION; PERINEURAL
Status: COMPLETED | OUTPATIENT
Start: 2020-02-05 | End: 2020-02-05

## 2020-02-05 RX ORDER — ACETAMINOPHEN 500 MG
1000 TABLET ORAL ONCE
Status: COMPLETED | OUTPATIENT
Start: 2020-02-05 | End: 2020-02-05

## 2020-02-05 RX ORDER — FENTANYL CITRATE 50 UG/ML
INJECTION, SOLUTION INTRAMUSCULAR; INTRAVENOUS PRN
Status: DISCONTINUED | OUTPATIENT
Start: 2020-02-05 | End: 2020-02-05 | Stop reason: SDUPTHER

## 2020-02-05 RX ADMIN — BUPIVACAINE HYDROCHLORIDE 5 ML: 5 INJECTION, SOLUTION EPIDURAL; INTRACAUDAL at 20:19

## 2020-02-05 RX ADMIN — SODIUM CHLORIDE, POTASSIUM CHLORIDE, SODIUM LACTATE AND CALCIUM CHLORIDE: 600; 310; 30; 20 INJECTION, SOLUTION INTRAVENOUS at 11:14

## 2020-02-05 RX ADMIN — FENTANYL CITRATE 25 MCG: 50 INJECTION, SOLUTION INTRAMUSCULAR; INTRAVENOUS at 19:26

## 2020-02-05 RX ADMIN — ACETAMINOPHEN 1000 MG: 500 TABLET, FILM COATED ORAL at 10:54

## 2020-02-05 RX ADMIN — FENTANYL CITRATE 100 MCG: 50 INJECTION, SOLUTION INTRAMUSCULAR; INTRAVENOUS at 16:45

## 2020-02-05 RX ADMIN — FENTANYL CITRATE 25 MCG: 50 INJECTION, SOLUTION INTRAMUSCULAR; INTRAVENOUS at 19:22

## 2020-02-05 RX ADMIN — ONDANSETRON 4 MG: 2 INJECTION INTRAMUSCULAR; INTRAVENOUS at 19:22

## 2020-02-05 RX ADMIN — FENTANYL CITRATE 25 MCG: 50 INJECTION, SOLUTION INTRAMUSCULAR; INTRAVENOUS at 18:23

## 2020-02-05 RX ADMIN — ROPIVACAINE HYDROCHLORIDE 10 ML: 5 INJECTION, SOLUTION EPIDURAL; INFILTRATION; PERINEURAL at 13:25

## 2020-02-05 RX ADMIN — SODIUM CHLORIDE, POTASSIUM CHLORIDE, SODIUM LACTATE AND CALCIUM CHLORIDE: 600; 310; 30; 20 INJECTION, SOLUTION INTRAVENOUS at 17:40

## 2020-02-05 RX ADMIN — BUPIVACAINE 10 ML: 13.3 INJECTION, SUSPENSION, LIPOSOMAL INFILTRATION at 20:19

## 2020-02-05 RX ADMIN — Medication 0.4 MG: at 19:31

## 2020-02-05 RX ADMIN — FENTANYL CITRATE 25 MCG: 50 INJECTION, SOLUTION INTRAMUSCULAR; INTRAVENOUS at 18:21

## 2020-02-05 RX ADMIN — CEFAZOLIN 2 G: 10 INJECTION, POWDER, FOR SOLUTION INTRAVENOUS at 16:52

## 2020-02-05 RX ADMIN — LIDOCAINE HYDROCHLORIDE 5 ML: 10 INJECTION, SOLUTION INFILTRATION; PERINEURAL at 13:25

## 2020-02-05 RX ADMIN — DEXAMETHASONE SODIUM PHOSPHATE 10 MG: 10 INJECTION, SOLUTION INTRAMUSCULAR; INTRAVENOUS at 16:56

## 2020-02-05 RX ADMIN — Medication 2 MG: at 19:31

## 2020-02-05 RX ADMIN — LIDOCAINE HYDROCHLORIDE 10 ML: 20 INJECTION, SOLUTION INFILTRATION; PERINEURAL at 13:25

## 2020-02-05 RX ADMIN — ROCURONIUM BROMIDE 50 MG: 10 INJECTION, SOLUTION INTRAVENOUS at 16:46

## 2020-02-05 RX ADMIN — MIDAZOLAM 3 MG: 1 INJECTION INTRAMUSCULAR; INTRAVENOUS at 13:06

## 2020-02-05 RX ADMIN — OXYCODONE HYDROCHLORIDE AND ACETAMINOPHEN 1 TABLET: 5; 325 TABLET ORAL at 20:43

## 2020-02-05 RX ADMIN — PROPOFOL 200 MG: 10 INJECTION, EMULSION INTRAVENOUS at 16:45

## 2020-02-05 RX ADMIN — Medication 0.2 MG: at 16:53

## 2020-02-05 RX ADMIN — LIDOCAINE HYDROCHLORIDE 50 MG: 10 INJECTION, SOLUTION EPIDURAL; INFILTRATION; INTRACAUDAL; PERINEURAL at 16:45

## 2020-02-05 RX ADMIN — MIDAZOLAM 2 MG: 1 INJECTION INTRAMUSCULAR; INTRAVENOUS at 16:45

## 2020-02-05 RX ADMIN — MIDAZOLAM 1 MG: 1 INJECTION INTRAMUSCULAR; INTRAVENOUS at 13:09

## 2020-02-05 ASSESSMENT — PULMONARY FUNCTION TESTS
PIF_VALUE: 19
PIF_VALUE: 18
PIF_VALUE: 13
PIF_VALUE: 19
PIF_VALUE: 11
PIF_VALUE: 19
PIF_VALUE: 20
PIF_VALUE: 3
PIF_VALUE: 19
PIF_VALUE: 20
PIF_VALUE: 21
PIF_VALUE: 12
PIF_VALUE: 17
PIF_VALUE: 13
PIF_VALUE: 12
PIF_VALUE: 13
PIF_VALUE: 21
PIF_VALUE: 0
PIF_VALUE: 17
PIF_VALUE: 21
PIF_VALUE: 2
PIF_VALUE: 11
PIF_VALUE: 6
PIF_VALUE: 20
PIF_VALUE: 19
PIF_VALUE: 18
PIF_VALUE: 19
PIF_VALUE: 20
PIF_VALUE: 21
PIF_VALUE: 13
PIF_VALUE: 21
PIF_VALUE: 20
PIF_VALUE: 19
PIF_VALUE: 18
PIF_VALUE: 19
PIF_VALUE: 12
PIF_VALUE: 13
PIF_VALUE: 12
PIF_VALUE: 10
PIF_VALUE: 21
PIF_VALUE: 11
PIF_VALUE: 20
PIF_VALUE: 2
PIF_VALUE: 21
PIF_VALUE: 17
PIF_VALUE: 21
PIF_VALUE: 12
PIF_VALUE: 0
PIF_VALUE: 11
PIF_VALUE: 20
PIF_VALUE: 21
PIF_VALUE: 21
PIF_VALUE: 30
PIF_VALUE: 19
PIF_VALUE: 19
PIF_VALUE: 20
PIF_VALUE: 13
PIF_VALUE: 12
PIF_VALUE: 20
PIF_VALUE: 21
PIF_VALUE: 19
PIF_VALUE: 20
PIF_VALUE: 20
PIF_VALUE: 21
PIF_VALUE: 18
PIF_VALUE: 12
PIF_VALUE: 11
PIF_VALUE: 21
PIF_VALUE: 20
PIF_VALUE: 11
PIF_VALUE: 2
PIF_VALUE: 12
PIF_VALUE: 19
PIF_VALUE: 18
PIF_VALUE: 17
PIF_VALUE: 12
PIF_VALUE: 12
PIF_VALUE: 20
PIF_VALUE: 13
PIF_VALUE: 12
PIF_VALUE: 11
PIF_VALUE: 11
PIF_VALUE: 12
PIF_VALUE: 18
PIF_VALUE: 18
PIF_VALUE: 21
PIF_VALUE: 21
PIF_VALUE: 20
PIF_VALUE: 20
PIF_VALUE: 11
PIF_VALUE: 20
PIF_VALUE: 12
PIF_VALUE: 20
PIF_VALUE: 21
PIF_VALUE: 12
PIF_VALUE: 18
PIF_VALUE: 21
PIF_VALUE: 20
PIF_VALUE: 20
PIF_VALUE: 11
PIF_VALUE: 13
PIF_VALUE: 20
PIF_VALUE: 17
PIF_VALUE: 13
PIF_VALUE: 20
PIF_VALUE: 19
PIF_VALUE: 20
PIF_VALUE: 2
PIF_VALUE: 12
PIF_VALUE: 21
PIF_VALUE: 11
PIF_VALUE: 21
PIF_VALUE: 19
PIF_VALUE: 13
PIF_VALUE: 20
PIF_VALUE: 21
PIF_VALUE: 18
PIF_VALUE: 18
PIF_VALUE: 13
PIF_VALUE: 19
PIF_VALUE: 19
PIF_VALUE: 13
PIF_VALUE: 12
PIF_VALUE: 20
PIF_VALUE: 0
PIF_VALUE: 11
PIF_VALUE: 12
PIF_VALUE: 20
PIF_VALUE: 11
PIF_VALUE: 19
PIF_VALUE: 21
PIF_VALUE: 12
PIF_VALUE: 23
PIF_VALUE: 2
PIF_VALUE: 13
PIF_VALUE: 12
PIF_VALUE: 1
PIF_VALUE: 19
PIF_VALUE: 10
PIF_VALUE: 20
PIF_VALUE: 11
PIF_VALUE: 19
PIF_VALUE: 21
PIF_VALUE: 23
PIF_VALUE: 12
PIF_VALUE: 13
PIF_VALUE: 20
PIF_VALUE: 20
PIF_VALUE: 21
PIF_VALUE: 11
PIF_VALUE: 17
PIF_VALUE: 21
PIF_VALUE: 20
PIF_VALUE: 13
PIF_VALUE: 17
PIF_VALUE: 12
PIF_VALUE: 2
PIF_VALUE: 12
PIF_VALUE: 13
PIF_VALUE: 12
PIF_VALUE: 19
PIF_VALUE: 11
PIF_VALUE: 21
PIF_VALUE: 20
PIF_VALUE: 11
PIF_VALUE: 17
PIF_VALUE: 3
PIF_VALUE: 12
PIF_VALUE: 11
PIF_VALUE: 13
PIF_VALUE: 3
PIF_VALUE: 12
PIF_VALUE: 2
PIF_VALUE: 24
PIF_VALUE: 20
PIF_VALUE: 11
PIF_VALUE: 12

## 2020-02-05 ASSESSMENT — PAIN DESCRIPTION - PAIN TYPE: TYPE: SURGICAL PAIN

## 2020-02-05 ASSESSMENT — PAIN SCALES - GENERAL
PAINLEVEL_OUTOF10: 0
PAINLEVEL_OUTOF10: 6
PAINLEVEL_OUTOF10: 5
PAINLEVEL_OUTOF10: 6
PAINLEVEL_OUTOF10: 6
PAINLEVEL_OUTOF10: 5
PAINLEVEL_OUTOF10: 6

## 2020-02-05 ASSESSMENT — PAIN DESCRIPTION - ONSET: ONSET: GRADUAL

## 2020-02-05 ASSESSMENT — PAIN - FUNCTIONAL ASSESSMENT
PAIN_FUNCTIONAL_ASSESSMENT: PREVENTS OR INTERFERES SOME ACTIVE ACTIVITIES AND ADLS
PAIN_FUNCTIONAL_ASSESSMENT: 0-10

## 2020-02-05 ASSESSMENT — PAIN DESCRIPTION - PROGRESSION: CLINICAL_PROGRESSION: NOT CHANGED

## 2020-02-05 ASSESSMENT — ENCOUNTER SYMPTOMS
SHORTNESS OF BREATH: 0
STRIDOR: 0

## 2020-02-05 ASSESSMENT — LIFESTYLE VARIABLES: SMOKING_STATUS: 0

## 2020-02-05 ASSESSMENT — PAIN DESCRIPTION - FREQUENCY: FREQUENCY: CONTINUOUS

## 2020-02-05 ASSESSMENT — PAIN DESCRIPTION - ORIENTATION: ORIENTATION: RIGHT

## 2020-02-05 ASSESSMENT — PAIN DESCRIPTION - DESCRIPTORS
DESCRIPTORS: ACHING;DISCOMFORT
DESCRIPTORS: SHARP;SHOOTING;STABBING;ACHING
DESCRIPTORS: ACHING;DULL

## 2020-02-05 ASSESSMENT — PAIN DESCRIPTION - LOCATION: LOCATION: SHOULDER

## 2020-02-05 NOTE — H&P
Expand All Collapse All      Show:Clear all  [x]Manual[x]Template[x]Copied    Added by:  [x]Darlin Jones, APRN - CNP    []Cecy for details        HISTORY and Josue RONAN [address]         NAME:  Gale Goldberg  MRN: [id number]   YOB: 1975   Date: 1/31/2020   Age: 53 y.o. Gender: female         COMPLAINT AND PRESENT HISTORY:      Gale Goldberg is 53 y.o.,  female, undergoing preadmission testing for right shoulder rotator cuff tear with scheduled for right shoulder arthroscopy rotator cuff repair possible subpectoral bicep tenodesis with interscalene block and exparel on 02/05/2020. Pt was moving a dining room table on December 13, 2019 and heard a pop, immediately felt pain in right shoulder. She fell down to bed of truck onto her hands which aggravated right shoulder pain further. Pt rates pain 7-8/10 during the day and 10/10 at night. She reports inability to sleep due to pain. Does not take any pain medications to relieve pain. Uses alternating ice/heat with mild relief of pain. There is a cracking/popping with movement. Range of motion to right shoulder is limited due to pain. Right shoulder abduction to 75 degrees, external rotation to 40. No weakness to right arm or  strength. No recent falls or trauma since injury occurred. No redness, rash or swelling.      No Hx of MRSA infections in the past.     Denies any current chest pain/pressure, palpitations, SOB, recent URI, nausea, vomiting, diarrhea, constipation, fever or chills.      EXAMINATION:  MRI OF THE RIGHT SHOULDER WITHOUT CONTRAST   1/9/2020 5:07 pm  Impression  Rupture of the supraspinatus tendon with approximately 1.5 cm of retraction.   High-grade tendinopathy and multifocal intrasubstance tears of the  infraspinatus, subscapularis, and intra-articular biceps tendon.        PAST MEDICAL HISTORY      Past Medical History        Past Medical History:   Diagnosis Date    Anxiety 5/3/2013     NO MED STOPPED ZOLOFT DID NOT LIKE SIDE EFFECTS TO FOLLOW WITH DR FOR ALTERNATE MED    Lumbar disc disease      Obesity (BMI 30-39.9) 1/22/2020    Rotator cuff tear 01/19/2013     TO HAVE SURGERY 07/2013, TEAR HAPPENED WHEN BENCH PRESSING WEIGHTS         Pt denies any history of Diabetes mellitus type 2, hypertension, stroke, heart disease, COPD, Asthma, GERD, HLD, Cancer, Seizures,Thyroid disease, Kidney Disease, Hepatitis, TB, Psychiatric Disorders or Substance abuse.     SURGICAL HISTORY        Past Surgical History         Past Surgical History:   Procedure Laterality Date    LUMBAR DISC SURGERY         L5 herniated disc            FAMILY HISTORY        Family History         Family History   Problem Relation Age of Onset    High Blood Pressure Mother      High Cholesterol Father      Diabetes Sister 5         INSULIN DEPENDANT    Diabetes Brother 8         INSULIN DEPENDANT    Cancer Maternal Grandmother 72         OVARIAN    Diabetes Paternal Grandmother           [facility]            SOCIAL HISTORY        Social History   Social History            Socioeconomic History    Marital status: Single       Spouse name: None    Number of children: None    Years of education: None    Highest education level: None   Occupational History    None   Social Needs    Financial resource strain: None    Food insecurity:       Worry: None       Inability: None    Transportation needs:       Medical: None       Non-medical: None   Tobacco Use    Smoking status: Never Smoker    Smokeless tobacco: Never Used   Substance and Sexual Activity    Alcohol use:  Yes       Comment: occassional    Drug use: No    Sexual activity: Yes       Partners: Male       Comment: PT DENIES PREGNANCY   Lifestyle    Physical activity:       Days per week: None       Minutes per session: None    Stress: None   Relationships    Social connections:       Talks on phone: None       Gets together: None       Attends [employer] service: None       Active member of club or organization: None       Attends meetings of clubs or organizations: None       Relationship status: None    Intimate partner violence:       Fear of current or ex partner: None       Emotionally abused: None       Physically abused: None       Forced sexual activity: None   Other Topics Concern    None   Social History Narrative    None            REVIEW OF SYSTEMS             Allergies   Allergen Reactions    Bactrim [Sulfamethoxazole-Trimethoprim]         anaphalyaxis    Ibuprofen Itching    Other         Ocean perch  What ever they put on it to preserve it                 Current Outpatient Medications on File Prior to Encounter   Medication Sig Dispense Refill    HYDROcodone-acetaminophen (NORCO) 5-325 MG per tablet Take 1 tablet by mouth every 4 hours for 42 doses. 42 tablet 0    ondansetron (ZOFRAN) 4 MG tablet Take 1 tablet by mouth daily as needed for Nausea or Vomiting 20 tablet 0    gabapentin (NEURONTIN) 300 MG capsule Take 1 tab, Daily at night 7 capsule 0      No current facility-administered medications on file prior to encounter.          General health:  Fairly good. No fever or chills. Skin:  No itching, redness or rash. HEENT:  No headache, epistaxis or sore throat. Neck:  No pain, stiffness or masses. Cardiovascular/Respiratory system:  No chest pain, palpitation or shortness of breath. Gastrointestinal tract: No abdominal pain, Dysphagia, nausea, vomiting, diarrhea or constipation. Genitourinary:  No burning on micturition. No hesitancy, urgency, frequency or discoloration of urine. Locomotor:  See HPI.                 Neuropsychiatric:  No referable complaints.                     GENERAL PHYSICAL EXAM:      Vitals: BP (!) 138/91   Pulse 88   Temp 98 °F (36.7 °C)   Resp 20   Ht 5' 2\" (1.575 m)   Wt 209 lb (94.8 kg)   LMP

## 2020-02-05 NOTE — ANESTHESIA PROCEDURE NOTES
Peripheral Block    Patient location during procedure: PACU  Start time: 2/5/2020 1:00 PM  End time: 2/5/2020 1:15 PM  Staffing  Anesthesiologist: Heavenly Oliver MD  Performed: anesthesiologist   Preanesthetic Checklist  Completed: patient identified, site marked, surgical consent, pre-op evaluation, timeout performed, IV checked, risks and benefits discussed, monitors and equipment checked, anesthesia consent given, oxygen available and patient being monitored  Peripheral Block  Patient position: supine  Prep: ChloraPrep  Patient monitoring: cardiac monitor, continuous pulse ox, frequent blood pressure checks and IV access  Block type: Brachial plexus  Laterality: right  Injection technique: single-shot  Procedures: ultrasound guided  Interscalene  Provider prep: mask and sterile gloves  Needle  Needle type: short-bevel   Needle gauge: 22 G  Needle length: 5 cm  Needle localization: ultrasound guidance  Test dose: negative  Assessment  Injection assessment: negative aspiration for heme, no paresthesia on injection and local visualized surrounding nerve on ultrasound  Paresthesia pain: none  Slow fractionated injection: yes  Hemodynamics: stable  Reason for block: post-op pain management and at surgeon's request

## 2020-02-05 NOTE — ANESTHESIA PRE PROCEDURE
Department of Anesthesiology  Preprocedure Note       Name:  David Merida   Age:  53 y.o.  :  1975                                          MRN:  [id number]         Date:  2020      Surgeon: Cindy Morales):  Paige Fernando MD    Procedure: SHOULDER ARTHROSCOPY ROTATOR CUFF REPAIR POSSIBLE SUBPECTORAL BICEP TENODESIS W/INTERSCALENE BLOCK & EXPAREL (Right )    Medications prior to admission:   Prior to Admission medications    Medication Sig Start Date End Date Taking? Authorizing Provider   HYDROcodone-acetaminophen (NORCO) 5-325 MG per tablet Take 1 tablet by mouth every 4 hours for 42 doses. 20  Paige Fernando MD   ondansetron (ZOFRAN) 4 MG tablet Take 1 tablet by mouth daily as needed for Nausea or Vomiting 20   Paige Fernando MD   gabapentin (NEURONTIN) 300 MG capsule Take 1 tab, Daily at night 20  Paige Fernando MD       Current medications:    Current Facility-Administered Medications   Medication Dose Route Frequency Provider Last Rate Last Dose    lactated ringers infusion   Intravenous Continuous [place] MD Berna        lidocaine PF 1 % injection 1 mL  1 mL Intradermal Once PRN Jennifer Van MD        sodium chloride flush 0.9 % injection 10 mL  10 mL Intravenous 2 times per day Jennifer Van MD        sodium chloride flush 0.9 % injection 10 mL  10 mL Intravenous PRN Jennifer Van MD        ceFAZolin (ANCEF) 2 g in dextrose 5 % 50 mL IVPB  2 g Intravenous On Call to [facility] [address], MD        dexamethasone (PF) (DECADRON) injection 10 mg  10 mg Intravenous Once Paige Fernando MD        sodium chloride flush 0.9 % injection 10 mL  10 mL Intravenous 2 times per day Paige Fernando MD        sodium chloride flush 0.9 % injection 10 mL  10 mL Intravenous PRN Paige Fernando MD           Allergies:     Allergies   Allergen Reactions    Bactrim [Sulfamethoxazole-Trimethoprim]      anaphalyaxis    Ibuprofen Itching    Other      Ocean perch  What ever they put on

## 2020-02-06 NOTE — BRIEF OP NOTE
Brief Postoperative Note  ______________________________________________________________    Patient: Yo Monroy  YOB: 1975  MRN: [id number]  Date of Procedure: 2/5/2020    Pre-Op Diagnosis: ROTATOR CUFF TEAR RIGHT SHOULDER    Post-Op Diagnosis: Same       Procedure(s):  SHOULDER ARTHROSCOPY ROTATOR CUFF REPAIR AND BICEP TENODESIS W/INTERSCALENE BLOCK & EXPAREL    Anesthesia: Regional, General    Surgeon(s):  Urvashi Patricio MD      Estimated Blood Loss (mL): Minimal    Complications: None    Specimens:   * No specimens in log *    Implants:  Implant Name Type Inv.  Item Serial No.  Lot No. LRB No. Used   [facility] BIO SLF PUNCHING 4.75X24.5MM Fastener ANCHOR SWIVELOCK BIO SLF PUNCHING 4.75X24.5MM  [employer] [id number] Right 1   CORKSCREW 5.5MM BC FT W/SUTURETAPE Screw/Plate/Nail/Cameron CORKSCREW 5.5MM BC FT W/SUTURETAPE  [employer] [id number] Right 1   CORKSCREW 5.5MM BC FT W/SUTURETAPE Screw/Plate/Nail/Cameron CORKSCREW 5.5MM BC FT W/SUTURETAPE  [employer] [id number] Right 1   ANCHOR SWIVELOCK BIO SLF PUNCHING 4.75X24.5MM Fastener ANCHOR SWIVELOCK BIO SLF PUNCHING 4.75X24.5MM  [employer] [id number] Right 1   ANCHOR SWIVELOCK BIO SLF PUNCHING 4.75X24.5MM Fastener ANCHOR SWIVELOCK BIO SLF PUNCHING 4.75X24.5MM  [employer] [id number] Right 1         Drains: * No LDAs found *    Findings: See operative report    Urvashi Patricio MD  Date: 2/5/2020  Time: 7:45 PM

## 2020-02-06 NOTE — ANESTHESIA POSTPROCEDURE EVALUATION
Department of Anesthesiology  Postprocedure Note    Patient: Sharonda Spence  MRN: [id number]  YOB: 1975  Date of evaluation: 2/5/2020  Time:  8:37 PM     Procedure Summary     Date:  02/05/20 Room / Location:  [address] [place] [address] / [address] [place] [address]     Anesthesia Start:  1637 Anesthesia Stop:  1944    Procedure:  SHOULDER ARTHROSCOPY ROTATOR CUFF REPAIR, SUBPECTORAL BICEP TENODESIS W/INTERSCALENE BLOCK & EXPAREL (Right Shoulder) Diagnosis:  (ROTATOR CUFF TEAR RIGHT SHOULDER)    Surgeon:  Nadira Reyes MD Responsible Provider:      Anesthesia Type:  general, regional ASA Status:  2          Anesthesia Type: general, regional    Marian Phase I: Marian Score: 10    Marian Phase II:      Last vitals: Reviewed and per EMR flowsheets.        Anesthesia Post Evaluation    Comments: POST- ANESTHESIA EVALUATION       Pt Name: Sharonda Spence  MRN: [id number]  YOB: 1975  Date of evaluation: 2/5/2020  Time:  8:37 PM      /66   Pulse 55   Temp 98.1 °F (36.7 °C)   Resp 19   Ht 5' 2\" (1.575 m)   Wt 209 lb (94.8 kg)   LMP 01/03/2020 (Approximate)   SpO2 98%   BMI 38.23 kg/m²      Consciousness Level  Awake  Cardiopulmonary Status  Stable  Pain Adequately Treated YES  Nausea / Vomiting  NO  Adequate Hydration  YES  Anesthesia Related Complications NONE      Electronically signed by Samanta Shay MD on 2/5/2020 at 8:37 PM

## 2020-02-06 NOTE — OP NOTE
Operative Report     Date of Procedure: 2/5/2020      Preop Diagnosis:   1. Right Rotator Cuff Tear (M75.121)  2. Right Biceps tenosynovitis     Postop Diagnosis:   1. Right  Rotator Cuff Tear (M75.121)  2. Right Partial biceps tendon tear     Procedure:    1. Right Shoulder Arthroscopic Rotator Cuff Repair (47265)  2. Right Shoulder Arthroscopic Biceps tenodesis (87223)     Surgeon: Gale Oropeza MD    Anesthesia: General with right interscalene nerve block    Blood Loss: Minimal    Findings: Upper border subscapularis tendon tear. Partial tear of the biceps tendon. Tear of the supraspinatus and leading half of the infraspinatus. Implants: [employer] 5.5 mm bio composite corkscrew anchors x 2, 4.75 mm self punching bio composite swivel lock anchors x 3     Indications: Gale Goldberg is a 53 y.o. old female who presented with functionally limiting right shoulder pain despite non-operative measures. MRI confirmed the presence of a rotator cuff tear and biceps Olivia synovitis. Following a discussion with the patient regarding her treatment options, she elected to proceed with an arthroscopic right shoulder rotator cuff repair with possible biceps tenodesis. she came to this decision after demonstrating a good understanding of our discussion. Risks, benefits, and alternatives were fully discussed. Postoperative limitations and limitations of the procedure were discussed in detail. The patient understood risks, alternatives, complications, & post-operative limitations and wishes to proceed. Operative Technique: Following appropriate identification of the patient and her operative extremity in the preoperative area, consent was reviewed with patient. Risks, benefits, and alternatives were discussed. All questions were answered. The anesthesia service administered a right interscalene nerve block. Patient was taken back to the OR and transferred onto the operative table.  General anesthesia was administered and her airway was secured. The patient was carefully positioned and secured in the beach-chair position, padding all prominences. SCDs and TEDs were placed on both legs. The right upper extremity was prepped and draped in the usual sterile fashion. The patient finished a course of pre-operative antibiotics by way of 2 g of IV Ancef. A time out was performed during which the correct patient, surgical site, and planned procedure were identified and then confirmed by the circulating nurse and anesthetic team.     The joint was insuflated with 30cc saline and a standard posterior portal was established. Diagnostic arthroscopy revealed intact chondral surfaces. Intact labrum. Upper border subscapularis tendon tear. Partial tear of the biceps tendon. Tear of the supraspinatus and leading half of the infraspinatus. Diffuse synovitis. An anterior portal was then established. An extensive debridement of frayed soft tissue and inflamed synovium was performed. The frayed edges of the rotator cuff were debrided to smooth surfaces. I released the biceps tendon off of its anchor on the superior labrum. Through a separate anterolateral fascial incision the biceps tendon was retrieved and grasping [place] sutures using #2 FiberWire were placed in the tendon and the torn portion of the tendon excised. Returning to the glenohumeral joint    I proceeded to debride the footprint for the subscapularis to bleeding bone. Luggage tag sutures were placed within the torn tendon and these were loaded into a 4.75 bio composite swivel lock anchor which was inserted into the footprint of the subscapularis. This was done with the sutures under appropriate tension effectively repairing the subscapularis back to its footprint. My attention was directed to the subacromial space. Through a separate lateral fascial incision, the subacromial space was entered. A thorough bursectomy was performed.  The rotator cuff was identified and noted

## 2020-02-13 RX ORDER — HYDROCODONE BITARTRATE AND ACETAMINOPHEN 5; 325 MG/1; MG/1
1 TABLET ORAL EVERY 4 HOURS
Qty: 30 TABLET | Refills: 0 | Status: SHIPPED | OUTPATIENT
Start: 2020-02-13 | End: 2020-02-18

## 2020-02-18 ENCOUNTER — OFFICE VISIT (OUTPATIENT)
Dept: ORTHOPEDIC SURGERY | Age: 45
End: 2020-02-18

## 2020-02-18 VITALS — BODY MASS INDEX: 38.46 KG/M2 | HEIGHT: 62 IN | WEIGHT: 209 LBS

## 2020-02-18 PROCEDURE — 99024 POSTOP FOLLOW-UP VISIT: CPT | Performed by: ORTHOPAEDIC SURGERY

## 2020-02-18 RX ORDER — HYDROCODONE BITARTRATE AND ACETAMINOPHEN 5; 325 MG/1; MG/1
1 TABLET ORAL EVERY 4 HOURS PRN
Qty: 30 TABLET | Refills: 0 | Status: SHIPPED | OUTPATIENT
Start: 2020-02-18 | End: 2020-02-25

## 2020-02-20 ENCOUNTER — HOSPITAL ENCOUNTER (OUTPATIENT)
Dept: PHYSICAL THERAPY | Facility: CLINIC | Age: 45
Setting detail: THERAPIES SERIES
Discharge: HOME | End: 2020-02-20
Payer: COMMERCIAL

## 2020-02-20 PROCEDURE — 97110 THERAPEUTIC EXERCISES: CPT

## 2020-02-20 PROCEDURE — 97016 VASOPNEUMATIC DEVICE THERAPY: CPT

## 2020-02-20 PROCEDURE — 97162 PT EVAL MOD COMPLEX 30 MIN: CPT

## 2020-02-20 NOTE — CONSULTS
[] [facility]) [facility] &  Therapy  [address]  [phone]  F: [phone] [x] [address]  [facility]   [address]  P: [phone]  F: [phone] [] [address] &  Therapy  [address] [facility]  P: [phone]  F: [phone] [] [address]  [facility]   [address]   [place]: [phone]  F: [phone]      Physical Therapy Upper Extremity Evaluation    Date:  2020  Patient: Larisa Parkinson  : 1975  MRN: [id number]  Physician: Dr. Jacqualyn Spatz: [facility]), unlimited visits  Medical Diagnosis: s/p right rotator cuff surgery   Rehab Codes: M25.511, M25.611, M79.621, M62.511  Onset Date: 2020 date of surgery     Next 's appt: 3-    Subjective:   CC:  Comes to PT post surgical, wearing right shoulder sling with abduction pillow. HPI: Was at home, lifting a heavy table up onto a  truck.   Injured 2019.  1-3-2020 saw Dr. Robert Ngo, had MRI, then surgery   no complications per patient  Procedure: Right shoulder arthroscopic rotator cuff repair and biceps tenodesis, also subscapularis tear    Date of procedure: 20  PMHx: [] Unremarkable [] Diabetes [] HTN  [] Pacemaker   [] MI/Heart Problems [] Cancer [] Arthritis [x] Other: -  complete tear in left side and 2 partial tears so left side is sore now due to overworking it since unable to use the right              [x] Refer to full medical chart  In EPIC       Comorbidities:   [x] Obesity [] Dialysis  [x] Other:lumbar disc disease   [] Asthma/COPD [] Dementia [] Other:   [] Stroke [] Sleep apnea [] Other:   [] Vascular disease [] Rheumatic disease [] Other:     Tests: [] X-Ray: [x] MRI:  [] Other:    Medications: [x] Refer to full medical record [] None [x] Other:penicillin  Allergies:      [x] Refer to full medical record [] None [] Other:    Function:  Hand Dominance  [x] Right  [] Left  Patient lives with: boyfriend   In what type of home []  One story   [x] Two story   [] Split level   Number of stairs to enter    With handrail on the [x]  Right to enter   [] Left to enter   Bathroom has a []  Tub only  [x] Tub/shower combo   [] Walk in shower    []  Grab bars - hold soap bar   Washing machine is on []  Main level   [] Second level   [x] Basement - boyfriend doing   [employer] []  Normal duty   [] Light duty   [x] Off due to condition    []  Retired   [] Not employed   [] Disability  [] Other:  []  Return to work:    Work activities/duties RN, [employer], 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     Enjoys furniture rehab  ADL/IADL Previous level of function Current level of function Who currently assists the patient with task   Bathing  [x] Independent  [] Assist [] Independent  [x] Assist    Dress/grooming [x] Independent  [] Assist [] Independent  [x] Assist    Transfer/mobility [x] Independent  [] Assist [] Independent  [x] Assist Assist to rise from mat to sitting   Feeding [x] Independent  [] Assist [x] Independent  [] Assist    Toileting [x] Independent  [] Assist [x] Independent  [] Assist    Driving [x] Independent  [] Assist [] Independent  [] Assist Friends, boyfriend   Housekeeping [x] Independent  [] Assist [] Independent  [x] Assist boyfriend   Grocery shop/meal prep [x] Independent  [] Assist [] Independent  [x] Assist boyfriend     Icing frequently at home  Gait Prior level of function Current level of function    [x] Independent  [] Assist [x] Independent  [] Assist     Pain:  [x] Yes  [] No Location: right shoulder Pain Rating: (0-10 scale) 8/10  Pain altered Tx:  [x] Yes  [] No  Action:added vaso at end of treatment    Symptoms:  [] Improving [] Worsening [x] Same- since surgery  Better:  [] AM    [] PM    [x] Sit    [] Rise/Sit    []Stand    [] Walk    [] Lying [x] Other:arm in sling  Worse: [] AM    [] PM    [] Sit    [] Rise/Sit    []Stand    [] Walk    [] Lying    [] Bend                      [] Valsalva    [x] Other:motion  Sleep: [] OK    [x] Disturbed - \"awful\" sleeping on couch, tried bed with pillows 2 days ago, unable to stay there  Easier to get up from off the couch    Objective:     ROM  °A = active  P = passive END FEEL STRENGTH TESTS (+/-) Left Right Not Tested    Left Right  Left Right Drop Arm   [x]   Sit Shld Flex 148 A P45    Sulcus Sign   [x]   Sit Shld Abd 86A 40 P    Apprehension   [x]   Sit Shld IR      Farrukhrgadamirs   [x]   Shoulder Flex      Speeds   [x]   Ext      Neer   [x]   ABD      Silva    [x]   ER @ 0   To neutral P    Painful Arc   [x]   IR  32 P    Tinel   [x]   Elbow Flex. Full AROm          Elbow Ext. Full, A          Pronation  Full A          Supination            Wrist Flex. Full, A          Wrist Ext. Full, A           74# L2 15#          Neck sore along right side and right upper trapezius, superior scapular  OBSERVATION No Deficit Deficit Not Tested Comments   Forward Head [] [x] []    Rounded Shoulders [] [x] []    Kyphosis [] [x] []    Scap Height/Position [] [x] []    Winging [] [] []    SH Rhythm [] [x] []    INSPECTION/PALPATION    Incisions healing well, no observed signs of infection, has 6 steristrips across incision   NEUROLOGICAL       Cervical ROM/Quadrant [x] [] [] Soreness in right upper trap   Reflexes [] [] []    Compression/Distraction [] [] []    Sensation [] [x] [] incisional     Functional Test: UEFS Score: 100% perceived functional impairment     Comments:    Assessment:  Patient would benefit from skilled physical therapy services in order to protect surgical repair, increase ROM and active use of right shoulder to include strengthening, PROM, posture education and modalities for pain control. Problems:    [x] ? Pain:8/10 in right shoulder post surgical  [x] ? ROM:right shoulder  [x] ? Ultrasound   [id number]   [] Neuromuscular Re-education  [id number] [] Electrical Stimulation Unattended  76635   [x] Manual Therapy  73720 [] Electrical Stimulation Attended  [id number]   [x] Instruction in HEP  [] Lumbar/Cervical Traction  [id number]   [] Aquatic Therapy   [id number] [x] Cold/hotpack    [] Massage   54535      [] Dry Needling, 1 or 2 muscles  52116   [] Biofeedback, first 15 minutes   08495  [] Biofeedback, additional 15 minutes   35627 [] Dry Needling, 3 or more muscles  85694      Frequency: 3 x/week for 18 visits    Todays Treatment:  Modalities: vasocompression, medium, 15 min, 34 degrees, sitting with arm in sling, resting on mat/pillows, end of treatment  Precautions:hx tear to left side also - not surgically repaired  2-5-2020 date of medium tear right rotator cuff surgery, subscapular repair, bicep tenodesis  NO aggressive or painful PROM   Exercises:  Exercise Reps/ Time Weight/ Level Comments   PROM to right shoulder 15 min  Supine, head on pillow and mat table elevated  Flex, abd         Cane, ER, elbow at side 15  No end range   Open and close fist 10x Active, no weight    Wrist flex, ext 10x Active, No wt    Supinate, pronate 10x Active No wt    Elbow flexion, extension 10x Using right arm to assist NO ACTIVE ELBOW FLEXION UNTIL  March 18 (6 weeks post op)   Cervical sidebending 10x each way     Cervical rotation 10x each way     Other:    Specific Instructions for next treatment:add sitting thoracic extension      Evaluation Complexity:  History (Personal factors, comorbidities) [] 0 [x] 1-2 [] 3+   Exam (limitations, restrictions) [] 1-2 [x] 3 [] 4+   Clinical presentation (progression) [] Stable [x] Evolving  [] Unstable   Decision Making [] Low [x] Moderate [] High    [] Low Complexity [x] Moderate Complexity [] High Complexity       Treatment Charges: Mins Units   [x] Evaluation       []  Low       [x]  Moderate       []  High 25 1   [x]  Modalities- vaso 15 1   [x]  Ther Exercise 15 1   []  Manual Therapy     []  Ther Activities     []  Aquatics     []  Vasocompression     []  Other       TOTAL TREATMENT TIME: 60 min    Time in: 9:10 am ( 15 minutes late for evaluation)    Time Out: 10:05 am    Electronically signed by: Gabriella Gomez PT        Physician Signature:________________________________Date:__________________  By signing above or cosigning this note, I have reviewed this plan of care and certify a need for medically necessary rehabilitation services.

## 2020-02-21 PROBLEM — Z12.39 BREAST SCREENING: Status: RESOLVED | Noted: 2020-01-22 | Resolved: 2020-02-21

## 2020-02-24 ENCOUNTER — HOSPITAL ENCOUNTER (OUTPATIENT)
Dept: PHYSICAL THERAPY | Facility: CLINIC | Age: 45
Setting detail: THERAPIES SERIES
Discharge: HOME | End: 2020-02-24
Payer: COMMERCIAL

## 2020-02-24 PROCEDURE — 97110 THERAPEUTIC EXERCISES: CPT

## 2020-02-24 PROCEDURE — 97016 VASOPNEUMATIC DEVICE THERAPY: CPT

## 2020-02-24 NOTE — FLOWSHEET NOTE
[] [facility]) - [facility] &  Therapy  [address]  [phone]  F: [phone] [x] [address]  [facility]   [address]  P: [phone]  F: [phone] [] [address] &  Therapy  [address]  P: [phone]  F: [phone] [] [address]  [facility]   [address]   [place]: [phone]  F: [phone]      Physical Therapy Daily Treatment Note    Date:  2020  Patient Name:  Maninder Hdz    :  1975  MRN: [id number]  Physician: Dr. Radford Back [facility]), unlimited visits  Medical Diagnosis: s/p right rotator cuff surgery       Rehab Codes: M25.511, M25.611, M79.621, M62.511  Onset Date: 2020 date of surgery                       Next 's appt: 3-  Visit# / total visits:      Cancels/No Shows: 0     Subjective:    Pain:  [x] Yes  [] No  Location: R shoulder  Pain Rating: (0-10 scale) 6/10  Pain altered Tx:  [] No  [x] Yes  Action: limited tolerance to PROM, very guarded    Comments: pt reports feelings of anxiety regarding shoulder. Is having trouble finding positions of comfort. Admitting she is not consistent with taking pain medication.      Objective:  Modalities: vasocompression, medium, 15 min, 34 degrees, sitting, resting on mat/pillows, end of treatment  Precautions:hx tear to left side also - not surgically repaired  2020 date of medium tear right rotator cuff surgery, subscapular repair, bicep tenodesis  NO aggressive or painful PROM   Exercises:  Exercise Reps/ Time Weight/ Level Comments   PROM to right shoulder 15 min   Supine, head on pillow and mat table elevated with towel roll behind L humerus  Flex, abd             Cane, ER, elbow at side 15   No end range   Open and close fist 10x Active, no weight   Wrist flex, ext 10x Active, No wt     Supinate, pronate 10x Active No wt     Elbow flexion, extension 10x Manual AA NO ACTIVE ELBOW FLEXION UNTIL  March 18 (6 weeks post op)   Cervical sidebending 10x each way       Cervical rotation 10x each way       Seated thoracic extension 10x  Added 2/24/20   Other:     Specific Instructions for next treatment:        Treatment Charges: Mins Units   []  Modalities     [x]  Ther Exercise 25 2   []  Manual Therapy     []  Ther Activities     []  Aquatics     [x]  Vasocompression 15 1   []  Other     Total Treatment time 40 3       Assessment: [] Progressing toward goals. [] No change. [x] Other: pt anxious upon entering with difficulty getting comfortable and relaxing to start PROM. Elevated the table for pt's head and gave towel roll behind R humerus for most comfortable positioning. Slow movement through little ROM was all pt would tolerate before becoming guarded. Followed PROM with cervical and wrist ROM, and AAROM of the R elbow. Pt reports pain throughout treatment. [x] Patient would continue to benefit from skilled physical therapy services in order to: rehabilitate R shoulder following RTC repair by stretching to maintain ROM and progress therapy per protocol. STG: (to be met in 8 treatments- first  Weeks post op)  1. ? Pain: Controlled post operative pain. 2. ? ROM: PROM in flexion to 90 degrees, PROM of ER in scapular plane to 20 degrees  3. Protect repair  4. Patient to be independent with home exercise program as demonstrated by performance with correct form without cues. 5. Ability to sleep 4 hours in her bed (verses currently on sofa)  6.    LTG: (to be met in 16 treatments) (6 weeks post op)  1. ER in plane of scapula to 45 degrees  2. ER at 60 abduction to 45 degrees  3. PROM of shoulder flexion to 120  4.  Continue to manage pain and inflammation post surgical.  5. Ability to perform scapular retraction and demonstrate upright posture entering clinic to prepare for further strengthening per protocol. 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 Education:  [x] Yes  [] No  [x] Reviewed Prior HEP/Ed, RTC protocol  Method of Education: [] Verbal  [] Demo  [] Written  Comprehension of Education:  [] Verbalizes understanding. [] Demonstrates understanding. [] Needs review. [x] Demonstrates/verbalizes HEP/Ed previously given. Plan: [x] Continue for 16 visits toward short and long term goals. [x] Specific Instructions for subsequent treatments: continue per RTC protocol.        Time In: 2:07pm              Time Out: 2:57pm    Electronically signed by:  Delbert Alan PTA

## 2020-02-27 ENCOUNTER — HOSPITAL ENCOUNTER (OUTPATIENT)
Dept: PHYSICAL THERAPY | Facility: CLINIC | Age: 45
Setting detail: THERAPIES SERIES
Discharge: HOME | End: 2020-02-27
Payer: COMMERCIAL

## 2020-02-27 PROCEDURE — 97110 THERAPEUTIC EXERCISES: CPT

## 2020-02-27 PROCEDURE — 97016 VASOPNEUMATIC DEVICE THERAPY: CPT

## 2020-02-27 NOTE — FLOWSHEET NOTE
[] [facility]) - [facility] &  Therapy  [address]  [phone]  F: [phone] [x] [address]  [facility] [address]   [address]  P: [phone]  F: [phone] [] [address] &  Therapy  [address] [facility]  P: [phone]  F: [phone] [] [address]  [facility]   [address]   [place]: [phone]  F: [phone]      Physical Therapy Daily Treatment Note    Date:  2020  Patient Name:  Geri Scott    :  1975  MRN: [id number]  Physician: Dr. Shana Owusu [facility]), unlimited visits  Medical Diagnosis: s/p right rotator cuff surgery       Rehab Codes: M25.511, M25.611, M79.621, M62.511  Onset Date: 2020 date of surgery                       Next 's appt: 3-   Visit# / total visits: 3/18     Cancels/No Shows: 0     Subjective:    Pain:  [x] Yes  [] No  Location: R shoulder  Pain Rating: (0-10 scale) 5/10 @ rest  Pain altered Tx:  [] No  [x] Yes  Action: limited tolerance to PROM, very guarded    Comments: Pt again with higher pain levels. Stating she only took 1/2 a pain pill this morning and she attempted to sleep in her bed last night. Discussed with pt the importance of taking pain meds prior to PT for improved tolerance to treatment. Objective:  Modalities: vasocompression, medium, 15 min, 34 degrees, sitting, resting on mat/pillows, end of treatment  Precautions:hx tear to left side also - not surgically repaired  2020 date of medium tear right rotator cuff surgery, subscapular repair, bicep tenodesis  NO aggressive or painful PROM   Exercises:  Exercise Reps/ Time Weight/ Level Comments   PROM to right shoulder 25 min   20- attempted in Supine, head on pillow and mat table elevated with towel roll behind L humerus.  Pt with

## 2020-02-28 ENCOUNTER — HOSPITAL ENCOUNTER (OUTPATIENT)
Dept: PHYSICAL THERAPY | Facility: CLINIC | Age: 45
Setting detail: THERAPIES SERIES
Discharge: HOME | End: 2020-02-28
Payer: COMMERCIAL

## 2020-02-28 ENCOUNTER — TELEPHONE (OUTPATIENT)
Dept: ORTHOPEDIC SURGERY | Age: 45
End: 2020-02-28

## 2020-02-28 NOTE — TELEPHONE ENCOUNTER
Patient to starting to do better after some rest and ice. No redness, tightness, swelling, fever. Just some throbbing pain. I will place order for patient if she changes her mind, but patient will try rest and icing and see if things keep improving.

## 2020-02-28 NOTE — TELEPHONE ENCOUNTER
Patient called in again this morning wanting to know if she should have a scan to rule out a blood clot. Having lots of pain and throbbing.

## 2020-02-28 NOTE — FLOWSHEET NOTE
[] [address]  [address]    [phone]  F: [phone]   [x] [address]  [facility]   [address]  P: [phone]  F: [phone]  [] [address]  [address] [facility]  P: [phone]  F: [phone]  [] [address]  [facility]   [address]   [place]: [phone]  F: [phone]   [] [place] [address] [facility] [address]  [place]: [phone]   F: [phone]     Physical Therapy Cancel/No Show note    Date: 2020  Patient: Roxanne Bauman  : 1975  MRN: [id number]    Cancels/No Shows to date:     For today's appointment patient:    [x]  Cancelled    [] Rescheduled appointment    [] No-show     Reason given by patient:    []  Patient ill    []  Conflicting appointment    [] No transportation      [] Conflict with work    [] No reason given    [] Weather related    [x] Other:  Increased pain    Comments:        [x] Next appointment was confirmed    Electronically signed by: Jean Paul Falcon PTA

## 2020-02-28 NOTE — TELEPHONE ENCOUNTER
Patient calling in she is having a lot of pain, she started PT last week has had 3 sessions, but the last 2 days she has been in a lot of pain, unable to sleep at night. She cancelled her PT for today due to the pain she is having. She is worried that they did something and have made it worse. She has been using ice on the area, not much relief. Do you want to see her? Or give her another script for pain?

## 2020-02-28 NOTE — TELEPHONE ENCOUNTER
Patient would like to know if you would give an script for a muscle relaxer because she is so tight for her PT visits

## 2020-03-02 ENCOUNTER — TELEPHONE (OUTPATIENT)
Dept: ORTHOPEDIC SURGERY | Age: 45
End: 2020-03-02

## 2020-03-02 ENCOUNTER — HOSPITAL ENCOUNTER (OUTPATIENT)
Dept: PHYSICAL THERAPY | Facility: CLINIC | Age: 45
Setting detail: THERAPIES SERIES
Discharge: HOME | End: 2020-03-02
Payer: COMMERCIAL

## 2020-03-02 PROCEDURE — 97110 THERAPEUTIC EXERCISES: CPT

## 2020-03-02 PROCEDURE — 97016 VASOPNEUMATIC DEVICE THERAPY: CPT

## 2020-03-02 RX ORDER — CYCLOBENZAPRINE HCL 10 MG
10 TABLET ORAL NIGHTLY PRN
Qty: 10 TABLET | Refills: 0 | Status: SHIPPED | OUTPATIENT
Start: 2020-03-02 | End: 2020-03-12

## 2020-03-02 NOTE — FLOWSHEET NOTE
[] [facility]) - [facility] &  Therapy  [address]  [phone]  F: [phone] [x] [address]  [facility]   [address]  P: [phone]  F: [phone] [] [address] &  Therapy  [address] [facility]  P: [phone]  F: [phone] [] [address]  [facility]   [address]   [place]: [phone]  F: [phone]      Physical Therapy Daily Treatment Note    Date:  3/2/2020  Patient Name:  David Merida    :  1975  MRN: [id number]  Physician: Dr. Rush [facility]), unlimited visits  Medical Diagnosis: s/p right rotator cuff surgery       Rehab Codes: M25.511, M25.611, M79.621, M62.511  Onset Date: 2020 date of surgery                       Next 's appt: 3-   Visit# / total visits:      Cancels/No Shows: 1/0     Subjective:    Pain:  [x] Yes  [] No  Location: R shoulder  Pain Rating: (0-10 scale) 5/10 @ rest  Pain altered Tx:  [] No  [x] Yes  Action: limited tolerance to PROM, very guarded    Comments: pt was prescribed a muscle relaxer to help with pain and tension. Started taking the prescription ~ 1 hour before today's PT appt. Pt reports less pain that she was having Friday and Saturday.        Objective:  Modalities: vasocompression, medium, 15 min, 34 degrees, sitting, resting on mat/pillows, end of treatment  Precautions:hx tear to left side also - not surgically repaired  2020 date of medium tear right rotator cuff surgery, subscapular repair, bicep tenodesis  NO aggressive or painful PROM   Exercises: bolded complete  Exercise Reps/ Time Weight/ Level Comments   PROM to right shoulder 18 min   In Supine, head on pillow and mat table elevated with towel roll behind L humerus.              Cane, ER, elbow at side 15   No end range   Open and close fist  Active, no weight HEP, no difficulty   Wrist flex, ext 10x Active, No wt     Supinate, pronate 10x Active No wt     Elbow flexion, extension 10x Manual AA NO ACTIVE ELBOW FLEXION UNTIL  March 18 (6 weeks post op)   Cervical sidebending 10x each way 5\" hold      Cervical rotation 10x each way 5\" hold     Seated thoracic extension 10x 5\" hold  Added 2/24/20   Other:     Specific Instructions for next treatment:        Treatment Charges: Mins Units   []  Modalities     [x]  Ther Exercise 28 2   []  Manual Therapy     []  Ther Activities     []  Aquatics     [x]  Vasocompression 15 1   []  Other     Total Treatment time 43 3       Assessment: [] Progressing toward goals. [] No change. [x] Other: pt's tolerance to PROM continues to be limited. However, pt was able to tolerate laying in a reclined position this date and allowed for more PROM comparted to last Thursday's session. After about 15 minutes into PROM, pt began becoming resistive to motion, with c/o feeling more tense. Ended PROM after pt was unable to relax to avoid an extreme increase in her pain levels. [x] Patient would continue to benefit from skilled physical therapy services in order to: rehabilitate R shoulder following RTC repair by stretching to maintain ROM and progress therapy per protocol. STG: (to be met in 8 treatments- first  Weeks post op)  1. ? Pain: Controlled post operative pain. 2. ? ROM: PROM in flexion to 90 degrees, PROM of ER in scapular plane to 20 degrees  3. Protect repair  4. Patient to be independent with home exercise program as demonstrated by performance with correct form without cues. 5. Ability to sleep 4 hours in her bed (verses currently on sofa)  6.    LTG: (to be met in 16 treatments) (6 weeks post op)  1. ER in plane of scapula to 45 degrees  2. ER at 60 abduction to 45 degrees  3. PROM of shoulder flexion to 120  4.  Continue to manage pain and inflammation post surgical.  5. Ability to perform scapular retraction and demonstrate upright posture entering clinic to prepare for further strengthening per protocol. 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 Education:  [] Yes  [x] No  [] Reviewed Prior HEP/Ed  Method of Education: [] Verbal  [] Demo  [] Written  Comprehension of Education:  [] Verbalizes understanding. [] Demonstrates understanding. [] Needs review. [] Demonstrates/verbalizes HEP/Ed previously given. Plan: [x] Continue for 14 visits toward short and long term goals. [x] Specific Instructions for subsequent treatments: continue per RTC protocol.        Time In: 2:02pm              Time Out: 2:45pm    Electronically signed by:  Delbert Alan PTA

## 2020-03-02 NOTE — LETTER
[address]  [address] [facility] [address] [facility] [address]  Phone: [phone]  Fax: [phone]    Hedy Rowe MD        March 9, 2020     Patient: Betzy Gilbert   YOB: 1975   Date of Visit: 3/2/2020       To Whom It May Concern: It is my medical opinion that Kristi Dailey was advised to, and was excused from work starting on 1/3/2020 after being seen in my office. Katie Giraldo then went on to have surgery on 2/5/2020 and will remain off work for an additional 3 months following surgery. If you have any questions or concerns, please don't hesitate to call.     Sincerely,          Hedy Rowe MD

## 2020-03-02 NOTE — TELEPHONE ENCOUNTER
Patient would like a call regarding her disability paperwork. She mentioned that she would like to know if Dr. Mary Philip would back date it to January 3, 2020. She said she was off work during that time.

## 2020-03-03 ENCOUNTER — HOSPITAL ENCOUNTER (OUTPATIENT)
Dept: PHYSICAL THERAPY | Facility: CLINIC | Age: 45
Setting detail: THERAPIES SERIES
Discharge: HOME | End: 2020-03-03
Payer: COMMERCIAL

## 2020-03-03 PROCEDURE — 97016 VASOPNEUMATIC DEVICE THERAPY: CPT

## 2020-03-03 PROCEDURE — 97110 THERAPEUTIC EXERCISES: CPT

## 2020-03-05 ENCOUNTER — HOSPITAL ENCOUNTER (OUTPATIENT)
Dept: PHYSICAL THERAPY | Facility: CLINIC | Age: 45
Setting detail: THERAPIES SERIES
Discharge: HOME | End: 2020-03-05
Payer: COMMERCIAL

## 2020-03-05 PROCEDURE — 97016 VASOPNEUMATIC DEVICE THERAPY: CPT

## 2020-03-05 PROCEDURE — 97110 THERAPEUTIC EXERCISES: CPT

## 2020-03-05 NOTE — TELEPHONE ENCOUNTER
Last rx was norco on 2/18. She is asking for a script of tramadol now since she is about out of her norco.  Are you willing to give her a script? RTC rep on 2/5.

## 2020-03-05 NOTE — FLOWSHEET NOTE
Open and close fist  Active, no weight HEP, no difficulty   Wrist flex, ext 10x Active, No wt HEP   Supinate, pronate 10x Active No wt  HEP   Elbow flexion, extension 10x Manual AA NO ACTIVE ELBOW FLEXION UNTIL  March 18 (6 weeks post op)   Cervical sidebending 10x each way 5\" hold      Cervical rotation 10x each way 5\" hold     Seated thoracic extension 10x 5\" hold  Added 2/24/20   Other:     Specific Instructions for next treatment:    Treatment Charges: Mins Units   []  Modalities     [x]  Ther Exercise 25 2   []  Manual Therapy     []  Ther Activities     []  Aquatics     [x]  Vasocompression 15 1   []  Other     Total Treatment time 40 3       Assessment: [] Progressing toward goals. [] No change. [x] Other: upon removal of sling, had pt perform pendulum exercises for relaxation before starting PROM. Pt able to lay on plinth in reclined position with 2  towel rolls propped behind humerus for PROM. Pt was able to relax during PROM but needed intermittent cueing and occasional change of therapists hand placement. Did best with one hand behind shoulder joint for support. [x] Patient would continue to benefit from skilled physical therapy services in order to: rehabilitate R shoulder following RTC repair by stretching to maintain ROM and progress therapy per protocol. STG: (to be met in 8 treatments- first  Weeks post op)  1. ? Pain: Controlled post operative pain. 2. ? ROM: PROM in flexion to 90 degrees, PROM of ER in scapular plane to 20 degrees  3. Protect repair  4. Patient to be independent with home exercise program as demonstrated by performance with correct form without cues. 5. Ability to sleep 4 hours in her bed (verses currently on sofa)  6.    LTG: (to be met in 16 treatments) (6 weeks post op)  1. ER in plane of scapula to 45 degrees  2. ER at 60 abduction to 45 degrees  3. PROM of shoulder flexion to 120  4.  Continue to manage pain and inflammation post surgical.  5. Ability to perform scapular retraction and demonstrate upright posture entering clinic to prepare for further strengthening per protocol. 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 Education:  [x] Yes  [] No  [x] Reviewed Prior HEP/Ed  Method of Education: [] Verbal  [] Demo  [] Written  Comprehension of Education:  [] Verbalizes understanding. [] Demonstrates understanding. [] Needs review. [x] Demonstrates/verbalizes HEP/Ed previously given. Pendulums       Plan: [x] Continue for a total of 18 visits toward short and long term goals. [x] Specific Instructions for subsequent treatments: continue per RTC protocol.        Time In: 1: 17 pm (for 1 appt)           Time Out: 2:00 pm    Electronically signed by:  Carol Florian PT

## 2020-03-06 RX ORDER — TRAMADOL HYDROCHLORIDE 50 MG/1
50 TABLET ORAL EVERY 6 HOURS PRN
Qty: 28 TABLET | Refills: 0 | Status: SHIPPED | OUTPATIENT
Start: 2020-03-06 | End: 2020-03-13

## 2020-03-09 ENCOUNTER — HOSPITAL ENCOUNTER (OUTPATIENT)
Dept: PHYSICAL THERAPY | Facility: CLINIC | Age: 45
Setting detail: THERAPIES SERIES
Discharge: HOME | End: 2020-03-09
Payer: COMMERCIAL

## 2020-03-09 PROCEDURE — 97110 THERAPEUTIC EXERCISES: CPT

## 2020-03-09 PROCEDURE — 97016 VASOPNEUMATIC DEVICE THERAPY: CPT

## 2020-03-12 ENCOUNTER — HOSPITAL ENCOUNTER (OUTPATIENT)
Dept: PHYSICAL THERAPY | Facility: CLINIC | Age: 45
Setting detail: THERAPIES SERIES
Discharge: HOME | End: 2020-03-12
Payer: COMMERCIAL

## 2020-03-12 PROCEDURE — 97016 VASOPNEUMATIC DEVICE THERAPY: CPT

## 2020-03-12 PROCEDURE — 97110 THERAPEUTIC EXERCISES: CPT

## 2020-03-12 NOTE — FLOWSHEET NOTE
[] [facility]) [facility] &  Therapy  [address]  [phone]  F: [phone] [x] [address]  [facility] 36   [address]  P: [phone]  F: [phone] [] [address] &  Therapy  [address] [facility]  P: [phone]  F: [phone] [] [address]  [facility]   [address]   [place]: [phone]  F: [phone]      Physical Therapy Daily Treatment Note    Date:  3/12/2020  Patient Name:  Cas Green    :  1975  MRN: [id number]  Physician: Dr. Miladis Delgado: Sara Rojas [facility]), unlimited visits  Medical Diagnosis: s/p right rotator cuff surgery       Rehab Codes: M25.511, M25.611, M79.621, M62.511  Onset Date: 2020 date of surgery                       Next 's appt: 3-   Visit# / total visits:      Cancels/No Shows: 1/0     Subjective:    Pain:  [x] Yes  [] No  Location: R shoulder  Pain Rating: (0-10 scale) 4/10 @ rest  Pain altered Tx:  [] No  [] Yes  Action:      Comments:  Took 1/2 a pain pill earlier today.  Was able to do pendulums at home and at start of PT    Objective:   Modalities: vasocompression, medium, 15 min, 34 degrees, sitting, resting on mat/pillows, end of treatment  Precautions:hx tear to left side also - not surgically repaired  2020 date of medium tear right rotator cuff surgery, subscapular repair, bicep tenodesis  NO aggressive or painful PROM   Exercises: bolded complete  Exercise Reps/ Time Weight/ Level Comments   PROM to right shoulder 25 min   In Supine, head on pillow and mat table elevated with towel roll behind L humerus.     pendulums x   At the beginning once sling is removed for relaxation   Cane, ER, elbow at side 15   No end range   Open and close fist  Active, no weight HEP, no difficulty   Wrist flex, ext 10x Active, No wt HEP   Supinate, pronate 10x Active No wt  HEP   Elbow flexion, extension 10x Manual AA NO ACTIVE ELBOW FLEXION UNTIL  March 18 (6 weeks post op)   Cervical sidebending 10x each way 5\" hold      Cervical rotation 10x each way 5\" hold     Seated thoracic extension 10x 5\" hold  Added 2/24/20   Other:     Specific Instructions for next treatment:    Treatment Charges: Mins Units   []  Modalities     [x]  Ther Exercise 35 2   []  Manual Therapy     []  Ther Activities     []  Aquatics     [x]  Vasocompression 15 1   []  Other     Total Treatment time 50 3       Assessment: [] Progressing toward goals. [] No change. [x] Other: upon removal of sling, had pt perform pendulum exercises for relaxation before starting PROM. Patient did well with relaxing during PROM, decreased muscle guarding and resistance. [x] Patient would continue to benefit from skilled physical therapy services in order to: rehabilitate R shoulder following RTC repair by stretching to maintain ROM and progress therapy per protocol. STG: (to be met in 8 treatments- first  Weeks post op)  1. ? Pain: Controlled post operative pain. 2. ? ROM: PROM in flexion to 90 degrees, PROM of ER in scapular plane to 20 degrees  3. Protect repair  4. Patient to be independent with home exercise program as demonstrated by performance with correct form without cues. 5. Ability to sleep 4 hours in her bed (verses currently on sofa)  6.    LTG: (to be met in 16 treatments) (6 weeks post op)  1. ER in plane of scapula to 45 degrees  2. ER at 60 abduction to 45 degrees  3. PROM of shoulder flexion to 120  4. Continue to manage pain and inflammation post surgical.  5. Ability to perform scapular retraction and demonstrate upright posture entering clinic to prepare for further strengthening per protocol. 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  Education:  [x] Yes  [] No  [x] Reviewed Prior

## 2020-03-16 ENCOUNTER — HOSPITAL ENCOUNTER (OUTPATIENT)
Dept: PHYSICAL THERAPY | Facility: CLINIC | Age: 45
Setting detail: THERAPIES SERIES
Discharge: HOME | End: 2020-03-16
Payer: COMMERCIAL

## 2020-03-16 PROCEDURE — 97110 THERAPEUTIC EXERCISES: CPT

## 2020-03-16 PROCEDURE — 97016 VASOPNEUMATIC DEVICE THERAPY: CPT

## 2020-03-16 NOTE — FLOWSHEET NOTE
[] [facility]) - [facility] &  Therapy  [address]  [phone]  F: [phone] [x] [address]  [facility]   [address]  P: [phone]  F: [phone] [] [address] &  Therapy  [address]  P: [phone]  F: [phone] [] [address]  [facility]   [address]   [place]: [phone]  F: [phone]      Physical Therapy Daily Treatment Note    Date:  3/16/2020  Patient Name:  Geri Scott    :  1975  MRN: [id number]  Physician: Dr. Bhavna Mcnally: [facility]), unlimited visits  Medical Diagnosis: s/p right rotator cuff surgery       Rehab Codes: M25.511, M25.611, M79.621, M62.511  Onset Date: 2020 date of surgery                       Next 's appt: 3-   Visit# / total visits:      Cancels/No Shows: 1/0     Subjective:    Pain:  [x] Yes  [] No  Location: R shoulder  Pain Rating: (0-10 scale) 4/10 @ rest  Pain altered Tx:  [] No  [] Yes  Action:      Comments:   Did not take pain pill prior to PT today. Reports it is starting to feel better, but still sore in joint/surgical area.      Objective:   Modalities: vasocompression, medium, 15 min, 34 degrees, sitting, resting on mat/pillows, end of treatment  Precautions:hx tear to left side also - not surgically repaired  2020 date of medium tear right rotator cuff surgery, subscapular repair, bicep tenodesis  NO aggressive or painful PROM   Exercises: bolded complete  Exercise Reps/ Time Weight/ Level Comments   PROM to right shoulder 35 min   In Supine, head on pillow and mat table elevated with towel roll behind L humerus.     pendulums x   At the beginning once sling is removed for relaxation   Cane, ER, elbow at side 15   No end range   Open and close fist  Active, no weight HEP, no difficulty   Wrist flex, ext 10x Active, No wt HEP   Supinate, pronate 10x Active No wt  HEP   Elbow flexion, extension 10x Manual AA NO ACTIVE ELBOW FLEXION UNTIL  March 18 (6 weeks post op)   Cervical sidebending 10x each way 5\" hold      Cervical rotation 10x each way 5\" hold     Seated thoracic extension 10x 5\" hold  Added 2/24/20   Other:     Specific Instructions for next treatment:    Treatment Charges: Mins Units   []  Modalities     [x]  Ther Exercise 45 3   []  Manual Therapy     []  Ther Activities     []  Aquatics     [x]  Vasocompression 15 1   []  Other     Total Treatment time 60 4       Assessment: [] Progressing toward goals. [] No change. [x] Other: upon removal of sling, had pt perform pendulum exercises for relaxation before starting PROM. Patient did well with relaxing during PROM, decreased muscle guarding and resistance. [x] Patient would continue to benefit from skilled physical therapy services in order to: rehabilitate R shoulder following RTC repair by stretching to maintain ROM and progress therapy per protocol. STG: (to be met in 8 treatments- first  Weeks post op)  1. ? Pain: Controlled post operative pain. 3-16-20 partially met, taking 1/2 a pain pill before therapy typically but reports she did not today  2. ? ROM: PROM in flexion to 90 degrees, PROM of ER in scapular plane to 20 degrees 3- flexion to 82 ER to 20  3. Protect repair, met 3-16-20  4. Patient to be independent with home exercise program as demonstrated by performance with correct form without cues. , met 3-16-20  5. Ability to sleep 4 hours in her bed (verses currently on sofa) - 3- Sleeping better, but still on sofa, short duration trials in bed  6.    LTG: (to be met in 16 treatments) (6 weeks post op)  1. ER in plane of scapula to 45 degrees  2. ER at 60 abduction to 45 degrees  3. PROM of shoulder flexion to 120  4.  Continue to manage pain and inflammation post surgical.  5. Ability to perform scapular retraction and demonstrate upright posture entering clinic to prepare for further strengthening per protocol. 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 Education:  [x] Yes  [] No  [x] Reviewed Prior HEP/Ed  Method of Education: [x] Verbal  [x] Demo  [x] Written  3- pt to follow up with Dr. Carmelina Rivera 3-26-20   3- issued and instructed on table flexion slides, ER with cane, scapular retraction, shoulder rolls, isometric into self - pt instructed to wait to start these until 3-18-20 6 week post op and after clearance to proceed from Dr. Carmelina Rivera (Given exercise instruction ahead of schedule due to uncertainty with COVID -19 concerns and whether or not outpt PT will be disrupted)  Patient also given our phone number to call with any questions. Comprehension of Education:  [] Verbalizes understanding. [] Demonstrates understanding. [] Needs review. [x] Demonstrates/verbalizes HEP/Ed previously given. Pendulums       Plan: [x] Continue for a total of 18 visits toward short and long term goals. [x] Specific Instructions for subsequent treatments: continue per RTC protocol.        Time In: 12:20 pm  Time Out: 1:20 pm    Electronically signed by:  Hunter Hernandez PT

## 2020-03-17 ENCOUNTER — OFFICE VISIT (OUTPATIENT)
Dept: ORTHOPEDIC SURGERY | Age: 45
End: 2020-03-17

## 2020-03-17 VITALS — HEIGHT: 62 IN | BODY MASS INDEX: 38.46 KG/M2 | WEIGHT: 209 LBS

## 2020-03-17 PROCEDURE — 99024 POSTOP FOLLOW-UP VISIT: CPT | Performed by: ORTHOPAEDIC SURGERY

## 2020-03-18 ENCOUNTER — HOSPITAL ENCOUNTER (OUTPATIENT)
Dept: PHYSICAL THERAPY | Facility: CLINIC | Age: 45
Setting detail: THERAPIES SERIES
Discharge: HOME | End: 2020-03-18
Payer: COMMERCIAL

## 2020-03-18 PROCEDURE — 97016 VASOPNEUMATIC DEVICE THERAPY: CPT

## 2020-03-18 PROCEDURE — 97110 THERAPEUTIC EXERCISES: CPT

## 2020-03-18 NOTE — FLOWSHEET NOTE
Left intentionally blank
Open and close fist  Active, no weight HEP, no difficulty   Wrist flex, ext 10x Active, No wt HEP   Supinate, pronate 10x Active No wt  HEP   Elbow flexion, extension 10x Manual AA NO ACTIVE ELBOW FLEXION UNTIL  March 18 (6 weeks post op)   Cervical sidebending 10x each way 5\" hold      Scapular retraction 15x5\"  Added 3/18   Shoulder roll 15x  Added 3/18   Cervical rotation 10x each way 5\" hold     Seated thoracic extension 10x 5\" hold  Added 2/24/20   Isometrics 10x5\" Abd,add, flex, ext, ER/IR  Frequent cues for technique 3/18   Other:     Specific Instructions for next treatment:    Treatment Charges: Mins Units   []  Modalities     [x]  Ther Exercise 45 3   []  Manual Therapy     []  Ther Activities     []  Aquatics     [x]  Vasocompression 15 1   []  Other     Total Treatment time 60 4       Assessment: [x] Progressing toward goals. Initiated AAROM exercises today. [] No change. [x] Other: Patient had a lot of difficulty initiating AAROM exercises due to both left arm also having increased pain. [x] Patient would continue to benefit from skilled physical therapy services in order to: rehabilitate R shoulder following RTC repair by stretching to maintain ROM and progress therapy per protocol. STG: (to be met in 8 treatments- first  Weeks post op)  1. ? Pain: Controlled post operative pain. 3-16-20 partially met, taking 1/2 a pain pill before therapy typically but reports she did not today  2. ? ROM: PROM in flexion to 90 degrees, PROM of ER in scapular plane to 20 degrees 3- flexion to 82 ER to 20  3. Protect repair, met 3-16-20  4. Patient to be independent with home exercise program as demonstrated by performance with correct form without cues. , met 3-16-20  5. Ability to sleep 4 hours in her bed (verses currently on sofa) - 3- Sleeping better, but still on sofa, short duration trials in bed  6.    LTG: (to be met in 16 treatments) (6 weeks post op)  1.  ER in plane of scapula to 45

## 2020-03-18 NOTE — PROGRESS NOTES
Procedure: Right shoulder arthroscopic rotator cuff repair and biceps tenodesis  Date of procedure: 2/5/20      HPI:  Nahed Oshea is a 53 y.o. female who is approximately 6 weeks status post aforementioned procedure. Indicates that she is doing better and is improving, but she still rates her pain as a 5/10. She denies having any fevers, chills, sweats or any constitutional symptoms. She has been compliant with hersling immobilization and indicates that she has been going to physical therapy. Physical examination:  Evaluation of patient's right shoulder and upper extremity demonstrates her incisions to be clean, dry, intact. There is no warmth, erythema, wound dehiscence or drainage. Sensation is grossly intact light touch in all dermatomes and she has a 2+ radial pulse with brisk capillary refill in her fingers. Impression and plan:  Nahed Oshea is a 53 y.o. female who is 6 weeks status post an arthroscopic right shoulder rotator cuff repair and biceps tenodesis. She is making progress clinically but is slow with regards to this. I encouraged her to keep up with physical therapy. She may discontinue her sling at this time and start using the extremity for light activities of daily living limiting any lifting, pushing, or pulling to 1 to 2 pounds. She was encouraged to work on getting her shoulder moving and I will see her back for reevaluation in 6 weeks but she may return or call earlier with questions and or concerns.

## 2020-03-20 ENCOUNTER — HOSPITAL ENCOUNTER (OUTPATIENT)
Dept: PHYSICAL THERAPY | Facility: CLINIC | Age: 45
Setting detail: THERAPIES SERIES
Discharge: HOME | End: 2020-03-20
Payer: COMMERCIAL

## 2020-03-20 PROCEDURE — 97110 THERAPEUTIC EXERCISES: CPT

## 2020-03-20 PROCEDURE — 97016 VASOPNEUMATIC DEVICE THERAPY: CPT

## 2020-03-20 NOTE — FLOWSHEET NOTE
fist  Active, no weight HEP, no difficulty   Wrist flex, ext 10x Active, No wt HEP   Supinate, pronate 10x Active No wt  HEP   Elbow flexion, extension 20x Active  Progressed to active 3/20/20   Cervical sidebending 10x each way 5\" hold      Scapular retraction 15x5\"  Added 3/18   Shoulder roll 15x  Added 3/18   Cervical rotation 10x each way 5\" hold     Seated thoracic extension 10x 5\" hold  Added 2/24/20   Isometrics 10x5\" Abd,add, flex, ext, ER/IR  Frequent cues for technique 3/18   Other:     Specific Instructions for next treatment:    Treatment Charges: Mins Units   []  Modalities     [x]  Ther Exercise 48 3   []  Manual Therapy     []  Ther Activities     []  Aquatics     [x]  Vasocompression 15 1   []  Other     Total Treatment time 63 4       Assessment: [x] Progressing toward goals. Fair tolerance to exercise. Pt c/o increased pain during isometrics. Less guarding with PROM. Progressed to AROM bicep curls per protocol. [] No change. [] Other:  [x] Patient would continue to benefit from skilled physical therapy services in order to: rehabilitate R shoulder following RTC repair by stretching to maintain ROM and progress therapy per protocol. STG: (to be met in 8 treatments- first  Weeks post op)  1. ? Pain: Controlled post operative pain. 3-16-20 partially met, taking 1/2 a pain pill before therapy typically but reports she did not today  2. ? ROM: PROM in flexion to 90 degrees, PROM of ER in scapular plane to 20 degrees 3- flexion to 82 ER to 20  3. Protect repair, met 3-16-20  4. Patient to be independent with home exercise program as demonstrated by performance with correct form without cues. , met 3-16-20  5. Ability to sleep 4 hours in her bed (verses currently on sofa) - 3- Sleeping better, but still on sofa, short duration trials in bed  6.    LTG: (to be met in 16 treatments) (6 weeks post op)  1. ER in plane of scapula to 45 degrees  2. ER at 60 abduction to 45 degrees  3.  PROM of shoulder flexion to 120  4. Continue to manage pain and inflammation post surgical.  5. Ability to perform scapular retraction and demonstrate upright posture entering clinic to prepare for further strengthening per protocol. 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 Education:  [x] Yes  [] No  [x] Reviewed Prior HEP/Ed  Method of Education: [] Verbal  [] Demo  [] Written  Patient also given our phone number to call with any questions. Comprehension of Education:  [] Verbalizes understanding. [] Demonstrates understanding. [] Needs review. [x] Demonstrates/verbalizes HEP/Ed previously given. Pt states she has being doing her HEP. May have overdone it yesterday. Plan: [x] Continue for a total of 18 visits toward short and long term goals. [x] Specific Instructions for subsequent treatments: continue per RTC protocol.        Time In: 2:10pm  Time Out: 3:13pm    Electronically signed by:  Alisa Berger PTA

## 2020-03-23 ENCOUNTER — HOSPITAL ENCOUNTER (OUTPATIENT)
Dept: PHYSICAL THERAPY | Facility: CLINIC | Age: 45
Setting detail: THERAPIES SERIES
Discharge: HOME | End: 2020-03-23
Payer: COMMERCIAL

## 2020-03-23 PROCEDURE — 97016 VASOPNEUMATIC DEVICE THERAPY: CPT

## 2020-03-23 PROCEDURE — 97110 THERAPEUTIC EXERCISES: CPT

## 2020-03-26 ENCOUNTER — HOSPITAL ENCOUNTER (OUTPATIENT)
Dept: PHYSICAL THERAPY | Facility: CLINIC | Age: 45
Setting detail: THERAPIES SERIES
Discharge: HOME | End: 2020-03-26
Payer: COMMERCIAL

## 2020-03-26 PROCEDURE — 97016 VASOPNEUMATIC DEVICE THERAPY: CPT

## 2020-03-26 PROCEDURE — 97110 THERAPEUTIC EXERCISES: CPT

## 2020-03-26 NOTE — FLOWSHEET NOTE
in bed  6.    LTG: (to be met in 16 treatments) (6 weeks post op)  1. ER in plane of scapula to 45 degrees  2. ER at 60 abduction to 45 degrees  3. PROM of shoulder flexion to 120  4. Continue to manage pain and inflammation post surgical.  5. Ability to perform scapular retraction and demonstrate upright posture entering clinic to prepare for further strengthening per protocol. 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 Education:  [x] Yes  [] No  [x] Reviewed Prior HEP/Ed  Method of Education: [] Verbal  [] Demo  [] Written  Patient also given our phone number to call with any questions. Comprehension of Education:  [] Verbalizes understanding. [] Demonstrates understanding. [] Needs review. [x] Demonstrates/verbalizes HEP/Ed previously given. Pt states she has being doing her HEP. May have overdone it yesterday. Plan: [x] Continue for a total of 18 visits toward short and long term goals. [x] Specific Instructions for subsequent treatments: continue per RTC protocol. Willis Arceo  PROM, gradually progress AAROM to AROM as tolerated      Time In: 1pm  Time Out: 2pm    Electronically signed by:  Leah Martinez, PT

## 2020-03-27 ENCOUNTER — APPOINTMENT (OUTPATIENT)
Dept: PHYSICAL THERAPY | Facility: CLINIC | Age: 45
End: 2020-03-27
Payer: COMMERCIAL

## 2020-03-30 ENCOUNTER — HOSPITAL ENCOUNTER (OUTPATIENT)
Dept: PHYSICAL THERAPY | Facility: CLINIC | Age: 45
Setting detail: THERAPIES SERIES
Discharge: HOME | End: 2020-03-30
Payer: COMMERCIAL

## 2020-03-30 ENCOUNTER — APPOINTMENT (OUTPATIENT)
Dept: PHYSICAL THERAPY | Facility: CLINIC | Age: 45
End: 2020-03-30
Payer: COMMERCIAL

## 2020-03-30 PROCEDURE — 97016 VASOPNEUMATIC DEVICE THERAPY: CPT

## 2020-03-30 PROCEDURE — 97110 THERAPEUTIC EXERCISES: CPT

## 2020-03-30 NOTE — FLOWSHEET NOTE
abduction to 45 degrees  3. PROM of shoulder flexion to 120  4. Continue to manage pain and inflammation post surgical.  5. Ability to perform scapular retraction and demonstrate upright posture entering clinic to prepare for further strengthening per protocol. 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 Education:  [x] Yes  [] No  [x] Reviewed Prior HEP/Ed  Method of Education: [] Verbal  [] Demo  [] Written  Patient also given our phone number to call with any questions. Comprehension of Education:  [] Verbalizes understanding. [] Demonstrates understanding. [] Needs review. [x] Demonstrates/verbalizes HEP/Ed previously given. Pt states she has being doing her HEP. May have overdone it yesterday. Plan: [x] Continue for a total of 18 visits toward short and long term goals. [x] Specific Instructions for subsequent treatments: continue per RTC protocol. Ruby Nieto  PROM, gradually progress AAROM to AROM as tolerated      Time In: 12:30 pm  Time Out: 1:20 pm    Electronically signed by:  Pipo Jones PTA

## 2020-04-01 ENCOUNTER — APPOINTMENT (OUTPATIENT)
Dept: PHYSICAL THERAPY | Facility: CLINIC | Age: 45
End: 2020-04-01
Payer: COMMERCIAL

## 2020-04-02 ENCOUNTER — HOSPITAL ENCOUNTER (OUTPATIENT)
Dept: PHYSICAL THERAPY | Facility: CLINIC | Age: 45
Setting detail: THERAPIES SERIES
Discharge: HOME | End: 2020-04-02
Payer: COMMERCIAL

## 2020-04-02 NOTE — FLOWSHEET NOTE
[] [facility]  [address]    [phone]  F: [phone]   [x] [address]  [facility] 36   [address]  P: [phone]  F: [phone]  [] [address]  [address]  P: [phone]  F: [phone]  [] [address]  [facility]   [address]   [place]: [phone]  F: [phone]   [] [address]  [place]: [phone]   F: [phone]     Physical Therapy Cancel/No Show note    Date: 2020  Patient: Butch Casey  : 1975  MRN: [id number]    Cancels/No Shows to date:      For today's appointment patient:    [x]  Cancelled    [] Rescheduled appointment    [] No-show     Reason given by patient:    []  Patient ill    []  Conflicting appointment    [] No transportation      [] Conflict with work    [] No reason given    [] Weather related    [x] Other:      Comments:  Stuffy nose will call to reschedule.        [] Next appointment was confirmed    Electronically signed by: Sunil Velazquez PT

## 2020-04-03 ENCOUNTER — APPOINTMENT (OUTPATIENT)
Dept: PHYSICAL THERAPY | Facility: CLINIC | Age: 45
End: 2020-04-03
Payer: COMMERCIAL

## 2020-04-06 ENCOUNTER — APPOINTMENT (OUTPATIENT)
Dept: PHYSICAL THERAPY | Facility: CLINIC | Age: 45
End: 2020-04-06
Payer: COMMERCIAL

## 2020-04-08 ENCOUNTER — APPOINTMENT (OUTPATIENT)
Dept: PHYSICAL THERAPY | Facility: CLINIC | Age: 45
End: 2020-04-08
Payer: COMMERCIAL

## 2020-04-10 ENCOUNTER — APPOINTMENT (OUTPATIENT)
Dept: PHYSICAL THERAPY | Facility: CLINIC | Age: 45
End: 2020-04-10
Payer: COMMERCIAL

## 2020-04-13 ENCOUNTER — APPOINTMENT (OUTPATIENT)
Dept: PHYSICAL THERAPY | Facility: CLINIC | Age: 45
End: 2020-04-13
Payer: COMMERCIAL

## 2020-04-15 ENCOUNTER — APPOINTMENT (OUTPATIENT)
Dept: PHYSICAL THERAPY | Facility: CLINIC | Age: 45
End: 2020-04-15
Payer: COMMERCIAL

## 2020-04-16 ENCOUNTER — HOSPITAL ENCOUNTER (OUTPATIENT)
Dept: PHYSICAL THERAPY | Facility: CLINIC | Age: 45
Setting detail: THERAPIES SERIES
Discharge: HOME | End: 2020-04-16
Payer: COMMERCIAL

## 2020-04-16 PROCEDURE — 97016 VASOPNEUMATIC DEVICE THERAPY: CPT

## 2020-04-16 PROCEDURE — 97110 THERAPEUTIC EXERCISES: CPT

## 2020-04-16 NOTE — FLOWSHEET NOTE
of medium tear right rotator cuff surgery, subscapular repair, bicep tenodesis  NO aggressive or painful PROM   Exercises: bolded complete  Exercise Reps/ Time Weight/ Level Comments   PROM to right shoulder 21 min   On reclined mat table    pendulums x   At the beginning once sling is removed for relaxation   Table slides 15x ea  Retraction, abduction and flexion   Wall walks 5x  Monitoring ROM to prevent compensatory strategies   Cane exercises 10x  Chest press UNABLE TO DO ALL OTHERS    Cane, ER, elbow at side 15   No end range   Open and close fist  Active, no weight HEP, no difficulty   Wrist flex, ext 10x Active, No wt HEP   Supinate, pronate 10x Active No wt  HEP   Elbow flexion, extension 20x Active  Progressed to active 3/20/20   at home   Cervical sidebending 10x each way 5\" hold      Scapular retraction 15x5\"  Added 3/18-     At home   Shoulder roll 15x  Added 3/18--  at home   Cervical rotation 10x each way 5\" hold     Seated thoracic extension 10x 5\" hold  Added 2/24/20   Standing towel stretch 10x  Added 4/16  Working towards midline   Isometrics 10x5\" Abd,add, flex, ext, ER/IR  Frequent cues for technique 3/18   Other:3/26/20attempted table slide on slightly elevated table- not able to perform  Able to sleep in bed with many pillows for support. 4/16/2020  PROM R shoulder (inclined)  Flexion: 102 deg  Abduction: 80 deg  ER at neutral: 20 deg         Treatment Charges: Mins Units   []  Modalities     [x]  Ther Exercise 35 2   []  Manual Therapy     []  Ther Activities     []  Aquatics     [x]  Vasocompression 15 1   []  Other     Total Treatment time 50 3       Assessment: [x] Progressing toward goals. Pt continues to note general soreness/achiness at the end of the session. Due to pt being out fo the clinic for 2 weeks, increased tightness of the R shoulder continues to be appreciated. Pt with facial grimacing toward available end ranges of movement and verbal reports of achiness in the shoulder. Pt with considerable tightness in the R shoulder, especially with ER. Pt also presents with decreased arthrokinematics of the R shoulder with compensatory shoulder hiking. Pt requires cueing to complete table slides slowly and to increase length of hold times for isometric activities. Implementation of IR towel stretch in order to improve ability to achieve midline was implemented to assist with washing her back and tucking in shirt. Wall walks in flexion were also implemented with notable fatigue of the R shoulder and verbal cues to take rest breaks to limit compensatory strategies. Cues to increase scapular retraction to promote scapulohumeral mechanics was provided. Pt was instructed to not use any resistance bands at this time and to focus on improving PROM/AAROM of the R shoulder. [] No change. [] Other:  [x] Patient would continue to benefit from skilled physical therapy services in order to: rehabilitate R shoulder following RTC repair by stretching to maintain ROM and progress therapy per protocol. STG: (to be met in 8 treatments- first  Weeks post op)  1. ? Pain: Controlled post operative pain. 3-16-20 partially met, taking 1/2 a pain pill before therapy typically but reports she did not today  2. ? ROM: PROM in flexion to 90 degrees, PROM of ER in scapular plane to 20 degrees 3- flexion to 82 ER to 20  3. Protect repair, met 3-16-20  4. Patient to be independent with home exercise program as demonstrated by performance with correct form without cues. , met 3-16-20  5. Ability to sleep 4 hours in her bed (verses currently on sofa) - 3- Sleeping better, but still on sofa, short duration trials in bed  6.    LTG: (to be met in 16 treatments) (6 weeks post op)  1. ER in plane of scapula to 45 degrees  2. ER at 60 abduction to 45 degrees  3. PROM of shoulder flexion to 120  4.  Continue to manage pain and inflammation post surgical.  5. Ability to perform scapular retraction and demonstrate

## 2020-04-17 ENCOUNTER — APPOINTMENT (OUTPATIENT)
Dept: PHYSICAL THERAPY | Facility: CLINIC | Age: 45
End: 2020-04-17
Payer: COMMERCIAL

## 2020-04-20 ENCOUNTER — HOSPITAL ENCOUNTER (OUTPATIENT)
Dept: PHYSICAL THERAPY | Facility: CLINIC | Age: 45
Setting detail: THERAPIES SERIES
Discharge: HOME | End: 2020-04-20
Payer: COMMERCIAL

## 2020-04-20 PROCEDURE — 97110 THERAPEUTIC EXERCISES: CPT

## 2020-04-20 PROCEDURE — 97016 VASOPNEUMATIC DEVICE THERAPY: CPT

## 2020-04-21 ENCOUNTER — TELEPHONE (OUTPATIENT)
Dept: ORTHOPEDIC SURGERY | Age: 45
End: 2020-04-21

## 2020-04-23 ENCOUNTER — HOSPITAL ENCOUNTER (OUTPATIENT)
Dept: PHYSICAL THERAPY | Facility: CLINIC | Age: 45
Setting detail: THERAPIES SERIES
Discharge: HOME | End: 2020-04-23
Payer: COMMERCIAL

## 2020-04-23 PROCEDURE — 97110 THERAPEUTIC EXERCISES: CPT

## 2020-04-23 PROCEDURE — 97016 VASOPNEUMATIC DEVICE THERAPY: CPT

## 2020-04-27 ENCOUNTER — OFFICE VISIT (OUTPATIENT)
Dept: ORTHOPEDIC SURGERY | Age: 45
End: 2020-04-27
Payer: COMMERCIAL

## 2020-04-27 PROCEDURE — 20610 DRAIN/INJ JOINT/BURSA W/O US: CPT | Performed by: ORTHOPAEDIC SURGERY

## 2020-04-27 PROCEDURE — 99024 POSTOP FOLLOW-UP VISIT: CPT | Performed by: ORTHOPAEDIC SURGERY

## 2020-04-27 RX ORDER — LIDOCAINE HYDROCHLORIDE 10 MG/ML
2 INJECTION, SOLUTION EPIDURAL; INFILTRATION; INTRACAUDAL; PERINEURAL ONCE
Status: COMPLETED | OUTPATIENT
Start: 2020-04-27 | End: 2020-04-27

## 2020-04-27 RX ORDER — TRIAMCINOLONE ACETONIDE 40 MG/ML
40 INJECTION, SUSPENSION INTRA-ARTICULAR; INTRAMUSCULAR ONCE
Status: COMPLETED | OUTPATIENT
Start: 2020-04-27 | End: 2020-04-27

## 2020-04-27 RX ADMIN — LIDOCAINE HYDROCHLORIDE 2 ML: 10 INJECTION, SOLUTION EPIDURAL; INFILTRATION; INTRACAUDAL; PERINEURAL at 12:05

## 2020-04-27 RX ADMIN — TRIAMCINOLONE ACETONIDE 40 MG: 40 INJECTION, SUSPENSION INTRA-ARTICULAR; INTRAMUSCULAR at 12:05

## 2020-04-30 ENCOUNTER — HOSPITAL ENCOUNTER (OUTPATIENT)
Dept: PHYSICAL THERAPY | Facility: CLINIC | Age: 45
Setting detail: THERAPIES SERIES
Discharge: HOME | End: 2020-04-30
Payer: COMMERCIAL

## 2020-04-30 PROCEDURE — 97110 THERAPEUTIC EXERCISES: CPT

## 2020-04-30 PROCEDURE — 97016 VASOPNEUMATIC DEVICE THERAPY: CPT

## 2020-04-30 NOTE — FLOWSHEET NOTE
[] [facility]  [address]  [phone]  F: [phone] [x] [address]  [facility]   [address]  P: [phone]  F: [phone] [] [address]  Therapy  [address]  P: [phone]  F: [phone] [] [address]  [facility]   [address]   [place]: [phone]  F: [phone]      Physical Therapy Daily Treatment Note    Date:  2020  Patient Name:  Janice Ochoa    :  1975  MRN: [id number]  Physician: Dr. Allyson Thomas: [facility]), unlimited visits  Medical Diagnosis: s/p right rotator cuff surgery       Rehab Codes: M25.511, M25.611, M79.621, M62.511  Onset Date: 2020 date of surgery                       Next 's appt: 2020   Visit# / total visits:      Cancels/No Shows: 2/0     2020  PROM R shoulder (inclined)   PROM inclined, supine  Flexion: 102 deg     Flexion 111  Abduction: 80 deg     Abduction 91  ER at neutral: 20 deg    ER  41    20 cane flexion to 114 supine    Subjective:    Pain:  [x] Yes  [] No  Location: R shoulder  Pain Rating: (0-10 scale) 4/10; 8/10 (at night); 7-8/10 (intermittently with movement)  Pain altered Tx:  [x] No  [] Yes  Action:      Patient to have injection 2020  Dr. Shan Chacko reported needs to be more aggressive with PT, emphasize HEP and OKay for light strengthening. Comments:  Patient had injection \"better motion\"  Reports she resumed trying Uli, shoulder rolls, not overhead, not doing the jumping motions. Patient reports some left arm soreness due to overusing it from limited use of right side. Prior soreness from doing Uli which she reports she is no longer doing.      Objective: 10 weeks s/p as of 4/15/2020   Modalities: vasocompression, low , 15 min, 34 degrees, sitting, resting on mat/pillows, end of treatment  Precautions:hx tear to left side also - not surgically repaired  2-5-2020 date of medium tear RIGHT rotator cuff surgery, subscapular repair, bicep tenodesis  NO aggressive or painful PROM   Exercises: bolded complete  Exercise Reps/ Time Weight/ Level Comments   UBE 2 min forward, 2 backward L1 Added 4/30   Supine cane flexion 20x 1# Added 4/30, palm up  4/30 114 flexion   Cane abd 20x 1# Add 4/30, palm up  4/30 , cueing for technique   Arm at 90, joint stabilization 20x  Added 4/30, pressure in lower arm, forearm    PROM to right shoulder 21 min   supine               Wall walks, flexion and abduction 10x each  Monitoring ROM to prevent compensatory strategies   Cane exercises 10x  Chest press    Cane, ER, elbow at side 15   No end range   Open and close fist  Active, no weight HEP, no difficulty   Wrist flex, ext 10x Active, No wt HEP   Supinate, pronate 10x Active No wt  HEP   Elbow flexion, extension 20x Active  Progressed to active 3/20/20   at home   Cervical sidebending 10x each way 5\" hold      Scapular retraction 15x5\"  Added 3/18-        Shoulder roll 15x  Added 3/18--  a   Cervical rotation 10x each way 5\" hold     Seated thoracic extension 10x 5\" hold  Added 2/24/20   Standing towel stretch 10x  Added 4/16  Working towards midline   Isometrics 10x5\" Abd,add, flex, ext, ER/IR  Frequent cues for technique 3/18   Other:     Treatment Charges: Mins Units   []  Modalities     [x]  Ther Exercise 45 3   []  Manual Therapy     []  Ther Activities     []  Aquatics     [x]  Vasocompression 15 1   []  Other     Total Treatment time 60 4       Assessment: [x] Progressing toward goals. Pt did well with exercises today, continued limited range as charted above. Progressed with increased active exercises today as charted above.    4-30-20 education on pacing with patient, completing HEP and not overdoing it with home tasks

## 2020-05-05 ENCOUNTER — HOSPITAL ENCOUNTER (OUTPATIENT)
Dept: PHYSICAL THERAPY | Facility: CLINIC | Age: 45
Setting detail: THERAPIES SERIES
Discharge: HOME | End: 2020-05-05
Payer: COMMERCIAL

## 2020-05-05 PROCEDURE — 97016 VASOPNEUMATIC DEVICE THERAPY: CPT

## 2020-05-05 PROCEDURE — 97110 THERAPEUTIC EXERCISES: CPT

## 2020-05-05 NOTE — PROGRESS NOTES
[] [facility]  [address]  [phone]  F: [phone] [x] [address] [facility] 36   [address]  P: [phone]  F: [phone] [] [place]  [address] [facility]  P: [phone]  F: [phone] [] [address]  [facility]   [address]   [place]: [phone]  F: [phone]      Physical Therapy Progress Note    Date: 2020      Patient: Rudi Taveras  : 1975  MRN: [id number]    Physician: Dr. GIBSON                                              Insurance: [address] [place] [address]), unlimited visits  Medical Diagnosis: s/p right rotator cuff surgery       Rehab Codes: M25.511, M25.611, M79.621, M62.511  Onset Date: 2020 date of surgery                       Next 's appt: 2020   Visit# / total visits:  (sent for additional visits 20)                           Cancels/No Shows: 2/0     Subjective:  Pain:  [x]? Yes  []? No               Location: R shoulder               Pain Rating: (0-10 scale) 4/10; 8/10 (at night); 7/10 (intermittently with movement)  Pain altered Tx:  []? No  [x]? Yes  Action:decreased exercises as charted below       Patient to have injection 2020  Dr. Shaan Brown reported needs to be more aggressive with PT, emphasize HEP and OKay for light strengthening.      Comments:  Patient reports relief from injection is wearing off, more sore again today.       Objective:  2020                                             PROM inclined, supine  Flexion          111  Abduction     91  ER                  41     20 cane flexion to 114 supine     Assessment:     STG: (to be met in 8 treatments- first  Weeks post op)  1. ? Pain: Controlled post operative pain. 3-16-20 partially met, taking 1/2 a pain pill before therapy

## 2020-05-05 NOTE — FLOWSHEET NOTE
[] [facility]  [address]  [phone]  F: [phone] [x] [address]  [facility]   [address]  P: [phone]  F: [phone] [] [address]  Therapy  [address] [facility]  P: [phone]  F: [phone] [] [address]  [facility]   [address]   [place]: [phone]  F: [phone]      Physical Therapy Daily Treatment Note    Date:  2020  Patient Name:  Dom Aiken    :  1975  MRN: [id number]  Physician: Dr. Coleen Corcoran: [facility]), unlimited visits  Medical Diagnosis: s/p right rotator cuff surgery       Rehab Codes: M25.511, M25.611, M79.621, M62.511  Onset Date: 2020 date of surgery                       Next 's appt: 2020   Visit# / total visits:  (sent for additional visits 20)     Cancels/No Shows: 2/0     2020      PROM inclined, supine  Flexion 111  Abduction 91  ER  41    20 cane flexion to 114 supine    Subjective:    Pain:  [x] Yes  [] No  Location: R shoulder  Pain Rating: (0-10 scale) 4/10; 8/10 (at night); 7/10 (intermittently with movement)  Pain altered Tx:  [] No  [x] Yes  Action:decreased exercises as charted below      Patient to have injection 2020  Dr. Mando Pinedo reported needs to be more aggressive with PT, emphasize HEP and OKay for light strengthening. Comments:  Patient reports relief from injection is wearing off, more sore again today.       Objective: 10 weeks s/p as of 4/15/2020   Modalities: vasocompression, low , 15 min, 34 degrees, sitting, resting on mat/pillows, end of treatment  Precautions:hx tear to left side also - not surgically repaired  2020 date of medium tear RIGHT rotator cuff surgery, subscapular repair, bicep tenodesis  NO aggressive or painful PROM   Exercises: bolded complete  Exercise Reps/ Time Weight/ Level Comments   UBE 3 min forward, 2 back backward L1 Added 4/30   Supine cane flexion 20x 1# Added 4/30, palm up  4/30 114 flexion   Cane abd 20x 1# Add 4/30, palm up  4/30 , cueing for technique  5/5 unable due to increased soreness   Arm at 90, joint stabilization 20x  Added 4/30, pressure in lower arm, forearm    PROM to right shoulder 21 min   supine               Wall walks, flexion and abduction 10x each  Monitoring ROM to prevent compensatory strategies, using bars on wall to slide up   Cane exercises 10x  Chest press    Cane, ER, elbow at side 15   No end range   Open and close fist  Active, no weight HEP, no difficulty   Wrist flex, ext 10x Active, No wt HEP   Supinate, pronate 10x Active No wt  HEP   Elbow flexion, extension 20x Active  Progressed to active 3/20/20   at home   Cervical sidebending 10x each way 5\" hold      T band            Scapular rows 10x lime Added 5/ 5   ext 10x lime Added 5/5   ER 10x liime Added 5/5                     Scapular retraction 15x5\"  Added 3/18-        Shoulder roll 15x  Added 3/18--  a   Cervical rotation 10x each way 5\" hold     Seated thoracic extension 10x 5\" hold  Added 2/24/20   Standing towel stretch 10x  Added 4/16  Working towards midline   Prone    next   rows   next   ext   next   Isometrics 10x5\" Abd,add, flex, ext, ER/IR  Frequent cues for technique 3/18   Other:     Treatment Charges: Mins Units   []  Modalities     [x]  Ther Exercise 25 2   []  Manual Therapy     []  Ther Activities     []  Aquatics     [x]  Vasocompression 15 1   []  Other     Total Treatment time 60 4       Assessment: [x] Progressing toward goals. Pt with increased soreness, \"injection wearing off\"  Will measure range next visit. Patient sore with PROM, shifting position frequently to adjust herself. Was able to add T band without difficulty. Held adding prone exercises today.   Patient able to do sidelying ER and horizontal abduction (limited range)  Patient to try some biofreeze at home    [] No change. [] Other:  [x] Patient would continue to benefit from skilled physical therapy services in order to: rehabilitate R shoulder following RTC repair by stretching to maintain ROM and progress therapy per protocol. STG: (to be met in 8 treatments- first  Weeks post op)  1. ? Pain: Controlled post operative pain. 3-16-20 partially met, taking 1/2 a pain pill before therapy typically but reports she did not today  2. ? ROM: PROM in flexion to 90 degrees, PROM of ER in scapular plane to 20 degrees 3- flexion to 82 ER to 20  3. Protect repair, met 3-16-20  4. Patient to be independent with home exercise program as demonstrated by performance with correct form without cues. , met 3-16-20  5. Ability to sleep 4 hours in her bed (verses currently on sofa) - 3- Sleeping better, but still on sofa, short duration trials in bed  6.    LTG: (to be met in 16 treatments) (6 weeks post op)  1. ER in plane of scapula to 45 degrees, partially met at 41 degrees  2. ER at 60 abduction to 45 degrees, partially met 111 degrees  3. PROM of shoulder flexion to 120 partially met 111 degrees  4. Continue to manage pain and inflammation post surgical. Met 4-  Ability to perform scapular retraction and demonstrate upright posture entering clinic to prepare for further strengthening per protocol. met 4-        Additional goals for visits 17-30   Continue to meet above unmet goals   Progress with light strengthening to try to improve AROM with functional tasks, dressing, reaching, to Prepare for RTW as hospital RN. Improve sleep by 50%, able to sleep 4 hours without increased pain. 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  Education:  [x] Yes  [] No  [x] Reviewed Prior HEP/Ed  Method of Education: [x] Verbal  [] Demo  [] Written  Patient also given our phone number to call with any questions. Comprehension of Education:  [x] Verbalizes understanding. [] Demonstrates understanding. [x] Needs review. [x] Demonstrates/verbalizes HEP/Ed previously given. Pt states she has being doing her HEP. May have overdone it yesterday. Plan: [x] Continue for a total of 18 visits toward short and long term goals. [x] Specific Instructions for subsequent treatments: continue per RTC protocol. Mike Seller PROM, gradually progress AAROM to AROM as tolerated   Progress with light active strengthening.       Time In: 12:05 Time Out: 12:53 pm    Electronically signed by:  Rowdy Willoughby, PT

## 2020-05-07 ENCOUNTER — HOSPITAL ENCOUNTER (OUTPATIENT)
Dept: PHYSICAL THERAPY | Facility: CLINIC | Age: 45
Setting detail: THERAPIES SERIES
Discharge: HOME | End: 2020-05-07
Payer: COMMERCIAL

## 2020-05-07 PROCEDURE — 97110 THERAPEUTIC EXERCISES: CPT

## 2020-05-07 PROCEDURE — 97016 VASOPNEUMATIC DEVICE THERAPY: CPT

## 2020-05-07 NOTE — FLOWSHEET NOTE
[] [facility]  [address]  [phone]  F: [phone] [x] [address]  [facility]   [address]  P: [phone]  F: [phone] [] [address]  Therapy  [address] [facility]  P: [phone]  F: [phone] [] [address]  [facility]   [address]   [place]: [phone]  F: [phone]      Physical Therapy Daily Treatment Note    Date:  2020  Patient Name:  Dom Aiken    :  1975  MRN: [id number]  Physician: Dr. Coleen Corcoran: [facility]), unlimited visits  Medical Diagnosis: s/p right rotator cuff surgery       Rehab Codes: M25.511, M25.611, M79.621, M62.511  Onset Date: 2020 date of surgery                       Next 's appt: 2020   Visit# / total visits:  (sent for additional visits 20, signed)     Cancels/No Shows: 2/0     2020      PROM inclined, supine  Flexion 111  Abduction 91  ER  41    20 cane flexion to 114 supine    MEASURE RANGE NEXT VISIT    Subjective:    Pain:  [x] Yes  [] No  Location: R shoulder  Pain Rating: (0-10 scale)  3/10 today  Pain altered Tx:  [x] No  [] Yes  Action:    Patient had injection 2020  Dr. Mando Pinedo reported needs to be more aggressive with PT, emphasize HEP and OKay for light strengthening. Comments:  Patient reports feeling a lot better today, used biofreeze, has switched to Advil from Tylenol. Monday and Tuesday sore, yesterday it \"settled down\" and today back to previous level without the increased pain. Also slept better tonight.       Objective: 10 weeks s/p as of 4/15/2020   Modalities: vasocompression, low , 15 min, 34 degrees, sitting, resting on mat/pillows, end of treatment  Precautions:hx tear to left side also - not surgically range next visit. P   [] No change. [] Other:  [x] Patient would continue to benefit from skilled physical therapy services in order to: rehabilitate R shoulder following RTC repair by stretching to maintain ROM and progress therapy per protocol. STG: (to be met in 8 treatments- first  Weeks post op)  1. ? Pain: Controlled post operative pain. 3-16-20 partially met, taking 1/2 a pain pill before therapy typically but reports she did not today  2. ? ROM: PROM in flexion to 90 degrees, PROM of ER in scapular plane to 20 degrees 3- flexion to 82 ER to 20  3. Protect repair, met 3-16-20  4. Patient to be independent with home exercise program as demonstrated by performance with correct form without cues. , met 3-16-20  5. Ability to sleep 4 hours in her bed (verses currently on sofa) - 3- Sleeping better, but still on sofa, short duration trials in bed  6.    LTG: (to be met in 16 treatments) (6 weeks post op)  1. ER in plane of scapula to 45 degrees, partially met at 41 degrees  2. ER at 60 abduction to 45 degrees, partially met 111 degrees  3. PROM of shoulder flexion to 120 partially met 111 degrees  4. Continue to manage pain and inflammation post surgical. Met 4-  Ability to perform scapular retraction and demonstrate upright posture entering clinic to prepare for further strengthening per protocol. met 4-        Additional goals for visits 17-30   Continue to meet above unmet goals   Progress with light strengthening to try to improve AROM with functional tasks, dressing, reaching, to Prepare for RTW as hospital RN. Improve sleep by 50%, able to sleep 4 hours without increased pain. 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 Education:  [x] Yes  [] No  [x] Reviewed Prior HEP/Ed  Method of Education: [x] Verbal  [x] Demo  [] Written  Patient also given our phone number to call with any questions.    Comprehension of Education:  [x] Verbalizes understanding. [] Demonstrates understanding. [x] Needs review. [x] Demonstrates/verbalizes HEP/Ed previously given. Pt states she has being doing her HEP. May have overdone it yesterday. Plan: [x] Continue for a total of 18 visits toward short and long term goals. [x] Specific Instructions for subsequent treatments: continue per RTC protocol. Vernestine Ket PROM, gradually progress AAROM to AROM as tolerated   Progress with light active strengthening.       Time In:4:30 pm Time Out: 5:30 pm    Electronically signed by:  Joaquín Jaffe, PT

## 2020-05-14 ENCOUNTER — HOSPITAL ENCOUNTER (OUTPATIENT)
Dept: PHYSICAL THERAPY | Facility: CLINIC | Age: 45
Setting detail: THERAPIES SERIES
Discharge: HOME | End: 2020-05-14
Payer: COMMERCIAL

## 2020-05-14 PROCEDURE — 97016 VASOPNEUMATIC DEVICE THERAPY: CPT

## 2020-05-14 PROCEDURE — 97110 THERAPEUTIC EXERCISES: CPT

## 2020-05-14 NOTE — FLOWSHEET NOTE
subsequent treatments: continue per RTC protocol.        Time In: 3: 46 pm Time Out: 4: 45 pm    Electronically signed by:  Natalie Valladares PT

## 2020-05-18 ENCOUNTER — HOSPITAL ENCOUNTER (OUTPATIENT)
Dept: PHYSICAL THERAPY | Facility: CLINIC | Age: 45
Setting detail: THERAPIES SERIES
Discharge: HOME | End: 2020-05-18
Payer: COMMERCIAL

## 2020-05-18 PROCEDURE — 97016 VASOPNEUMATIC DEVICE THERAPY: CPT

## 2020-05-18 PROCEDURE — 97110 THERAPEUTIC EXERCISES: CPT

## 2020-05-18 NOTE — FLOWSHEET NOTE
10x each   Monitoring ROM to prevent compensatory strategies, using bars on wall to slide up    5/12 able to reach top of bar without difficulty    ball circles on wall  10x each way  yellow, 2.2 lbs  added 5/14   T band         Scapular rows 10x lime Added 5/ 5   ext 10x lime Added 5/5   ER 10x lime Added 5/5   abduction 10x lime Added 5/7             Standing flexion 15x 2# Added 5/7, 5/14 added wt   abduction 15 1# Added 5/7   Bicep curls 15 2# Added 5/7, 5/14 added wt   scaption 15 2# Added 5/7, 5/14 added wt    rows  15 3#  added 5/12             Scapular retraction 15x5\"   Added 3/18-         Shoulder roll 15x   Added 3/18--  a   Cervical rotation 10x each way 5\" hold     Seated thoracic extension 10x 5\" hold  Added 2/24/20   Standing towel stretch 10x   Added 4/16  Working towards midline   Prone      next   rows     next   ext     next   Isometrics 10x5\" Abd,add, flex, ext, ER/IR  Frequent cues for technique 3/18   Other:        Specific Instructions for next treatment:    Treatment Charges: Mins Units   []  Modalities     [x]  Ther Exercise 37 2   []  Manual Therapy     []  Ther Activities     []  Aquatics     [x]  Vasocompression 10 1   []  Other     Total Treatment time 47 3       Assessment: [x] Progressing toward goals. Pt tolerates exercises well. No progressions made this date d/t c/o feeling more sore following yesterdays household chores. Pt continues to struggle with some ADLs, most specifically donning her bra, and cleaning after using the restroom. Ended with vasocompression x 10 minutes per pt request.     [] No change.      [] Other:   5-  [x] AROM standing right shoulder  strength  Flexion 98/112    -3/5      Abduction 72/6    -3/5  IR  To spine behind back  +3/5  ER  40 arm at side   -4/5  Ext  WNL         4/5    ER with arm at side functional but difficulty with ER if any abduction of arm     STG: (to be met in 8 treatments- first  Weeks post op)  1. ? Pain: Controlled post operative pain 5- improving, taking Advil before bed  2. ? ROM: PROM in flexion to 90 degrees, PROM of ER in scapular plane to 20 degrees 5- met   3. Protect repair,    4. Patient to be independent with home exercise program as demonstrated by performance with correct form without cues. , met 3-16-20  5. Ability to sleep 4 hours in her bed (verses currently on sofa) -5/12/2020 improved with Advil, met  6.    LTG: (to be met in 16 treatments) (6 weeks post op)  1. ER in plane of scapula to 45 degrees  2. ER at 60 abduction to 45 degrees  3. PROM of shoulder flexion to 120  4. Continue to manage pain and inflammation post surgical.  5. Ability to perform scapular retraction and demonstrate upright posture entering clinic to prepare for further strengthening per protocol. Additional goals for visits 17-30        Continue to meet above unmet goals        Progress with light strengthening to try to improve AROM with functional tasks, dressing, reaching, to Prepare for RTW as hospital RN.       Improve sleep by 50%, able to sleep 4 hours without increased pain. 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 Education:  [] Yes  [x] No  [] Reviewed Prior HEP/Ed  Method of Education: [] Verbal  [] Demo  [] Written  3- pt to follow up with Dr. Cathie Antonio 3-26-20   3- issued and instructed on table flexion slides, ER with cane, scapular retraction, shoulder rolls, isometric into self - pt instructed to wait to start these until 3-18-20 6 week post op and after clearance to proceed from Dr. Cathie Antonio (Given exercise instruction ahead of schedule due to uncertainty with COVID -19 concerns and whether or not outpt PT will be disrupted)  Patient also given our phone number to call with any questions. Comprehension of Education:  [] Verbalizes understanding. [] Demonstrates understanding. [] Needs review. [x] Demonstrates/verbalizes HEP/Ed previously given.

## 2020-05-21 ENCOUNTER — HOSPITAL ENCOUNTER (OUTPATIENT)
Dept: PHYSICAL THERAPY | Facility: CLINIC | Age: 45
Setting detail: THERAPIES SERIES
Discharge: HOME | End: 2020-05-21
Payer: COMMERCIAL

## 2020-05-27 ENCOUNTER — HOSPITAL ENCOUNTER (OUTPATIENT)
Dept: PHYSICAL THERAPY | Facility: CLINIC | Age: 45
Setting detail: THERAPIES SERIES
Discharge: HOME | End: 2020-05-27
Payer: COMMERCIAL

## 2020-05-27 ENCOUNTER — OFFICE VISIT (OUTPATIENT)
Dept: INTERNAL MEDICINE CLINIC | Age: 45
End: 2020-05-27
Payer: COMMERCIAL

## 2020-05-27 VITALS
DIASTOLIC BLOOD PRESSURE: 88 MMHG | HEART RATE: 74 BPM | OXYGEN SATURATION: 96 % | TEMPERATURE: 98.9 F | SYSTOLIC BLOOD PRESSURE: 138 MMHG | BODY MASS INDEX: 40.12 KG/M2 | HEIGHT: 62 IN | WEIGHT: 218 LBS

## 2020-05-27 PROCEDURE — 99213 OFFICE O/P EST LOW 20 MIN: CPT | Performed by: PHYSICIAN ASSISTANT

## 2020-05-27 PROCEDURE — 97110 THERAPEUTIC EXERCISES: CPT

## 2020-05-27 RX ORDER — PHENTERMINE HYDROCHLORIDE 37.5 MG/1
37.5 TABLET ORAL
Qty: 30 TABLET | Refills: 0 | Status: SHIPPED | OUTPATIENT
Start: 2020-05-27 | End: 2020-06-24 | Stop reason: SDUPTHER

## 2020-05-27 RX ORDER — SODIUM CHLORIDE, SODIUM LACTATE, POTASSIUM CHLORIDE, CALCIUM CHLORIDE 600; 310; 30; 20 MG/100ML; MG/100ML; MG/100ML; MG/100ML
INJECTION, SOLUTION INTRAVENOUS
COMMUNITY
Start: 2020-02-05 | End: 2020-06-24 | Stop reason: ALTCHOICE

## 2020-05-27 NOTE — FLOWSHEET NOTE
4/30, pressure in lower arm, forearm    PROM to right shoulder 8 min   supine                       Wall walks, flexion and abduction 10x each   Monitoring ROM to prevent compensatory strategies, using bars on wall to slide up    5/12 able to reach top of bar without difficulty    ball circles on wall  10x each way  yellow, 2.2 lbs  added 5/14   T band         Scapular rows 10x lime Added 5/ 5   ext 10x lime Added 5/5   ER 10x lime Added 5/5   abduction 10x lime Added 5/7             Standing flexion 20x 2# Added 5/7, 5/14 added wt   abduction 15 1# Added 5/7   Bicep curls 15 2# Added 5/7, 5/14 added wt   scaption 15 2# Added 5/7, 5/14 added wt    rows  15 3#  added 5/12             Scapular retraction 15x5\"   Added 3/18-         Shoulder roll 15x   Added 3/18--  a   Cervical rotation 10x each way 5\" hold     Seated thoracic extension 10x 5\" hold  Added 2/24/20   Standing towel stretch 10x   Added 4/16  Working towards midline   Prone      next   rows     next   ext     next   Isometrics 10x5\" Abd,add, flex, ext, ER/IR  Frequent cues for technique 3/18   Other:        Specific Instructions for next treatment: Measure right shoulder    Treatment Charges: Mins Units   [x]  Modalities- gel ice 12 0   [x]  Ther Exercise 42 3   []  Manual Therapy     []  Ther Activities     []  Aquatics     []  Vasocompression     []  Other     Total Treatment time 54 3       Assessment: [x] Progressing toward goals. Patient completed above exercises, decreasing pain and increasing mobility. 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 [] No change.      [] Other:   5-  [x] AROM standing right shoulder  strength  Flexion 98/112    -3/5      Abduction 72/6    -3/5  IR  To spine behind disrupted)  Patient also given our phone number to call with any questions. Comprehension of Education:  [] Verbalizes understanding. [] Demonstrates understanding. [] Needs review. [x] Demonstrates/verbalizes HEP/Ed previously given. Pendulums       Plan: [x] Continue for a total of 18 visits toward short and long term goals. [x] Specific Instructions for subsequent treatments: continue per RTC protocol.        Time In: 11: 13 am  Time Out: 12:07 pm    Electronically signed by:  Suzanne Velasco PT

## 2020-05-27 NOTE — PROGRESS NOTES
AND INSTRUCTIONS:   Return in about 4 weeks (around 6/24/2020). Aria Valente received counseling on the following healthy behaviors: nutrition, exercise and medication adherence    Discussed use, benefit, and side effects of prescribed medications. Barriers to medication compliance addressed. All patient questions answered. Patient voiced understanding. Patient given educational materials - see patient instructions    Elizabeth POWERS [facility]  5/29/2020, 1:09 PM    Please note that this chart was generated using voice recognition Dragon dictation software. Although everyeffort was made to ensure the accuracy of this automated transcription, some errors in transcription may have occurred.

## 2020-05-28 ENCOUNTER — HOSPITAL ENCOUNTER (OUTPATIENT)
Dept: PHYSICAL THERAPY | Facility: CLINIC | Age: 45
Setting detail: THERAPIES SERIES
Discharge: HOME | End: 2020-05-28
Payer: COMMERCIAL

## 2020-05-28 PROCEDURE — 97110 THERAPEUTIC EXERCISES: CPT

## 2020-05-28 NOTE — FLOWSHEET NOTE
return to work as a nurse    Pt. Education:  [] Yes  [x] No  [] Reviewed Prior HEP/Ed  Method of Education: [] Verbal  [] Demo  [] Written  3- pt to follow up with Dr. Shawn Almeida 3-26-20   3- issued and instructed on table flexion slides, ER with cane, scapular retraction, shoulder rolls, isometric into self - pt instructed to wait to start these until 3-18-20 6 week post op and after clearance to proceed from Dr. Shawn Almeida (Given exercise instruction ahead of schedule due to uncertainty with COVID -19 concerns and whether or not outpt PT will be disrupted)  Patient also given our phone number to call with any questions. Comprehension of Education:  [] Verbalizes understanding. [] Demonstrates understanding. [] Needs review. [x] Demonstrates/verbalizes HEP/Ed previously given. Pendulums       Plan: [x] Continue for a total of 18 visits toward short and long term goals. [x] Specific Instructions for subsequent treatments: continue per RTC protocol.        Time In: 2:57 pm  Time Out: 3:52 pm    Electronically signed by:  Otf Gibson, PT

## 2020-05-29 ASSESSMENT — ENCOUNTER SYMPTOMS
COLOR CHANGE: 0
SINUS PAIN: 0
EYE ITCHING: 0
DIARRHEA: 0
SHORTNESS OF BREATH: 0
EYE DISCHARGE: 0
ABDOMINAL PAIN: 0
VOMITING: 0
COUGH: 0
RHINORRHEA: 0
SORE THROAT: 0
CHEST TIGHTNESS: 0
BLOOD IN STOOL: 0
BACK PAIN: 0
CONSTIPATION: 0
NAUSEA: 0
EYE PAIN: 0

## 2020-06-01 ENCOUNTER — HOSPITAL ENCOUNTER (OUTPATIENT)
Dept: PHYSICAL THERAPY | Facility: CLINIC | Age: 45
Setting detail: THERAPIES SERIES
Discharge: HOME | End: 2020-06-01
Payer: COMMERCIAL

## 2020-06-01 PROCEDURE — 97110 THERAPEUTIC EXERCISES: CPT

## 2020-06-01 NOTE — FLOWSHEET NOTE
4+/5     STG: (to be met in 8 treatments- first  Weeks post op)  1. ? Pain: Controlled post operative pain 5- improving, taking Advil before bed  2. ? ROM: PROM in flexion to 90 degrees, PROM of ER in scapular plane to 20 degrees 5- met   3. Protect repair,    4. Patient to be independent with home exercise program as demonstrated by performance with correct form without cues. , met 3-16-20  5. Ability to sleep 4 hours in her bed (verses currently on sofa) -5/12/2020 improved with Advil, met  6.    LTG: (to be met in 16 treatments) (6 weeks post op)  1. ER in plane of scapula to 45 degrees - 5/28/2020 met  2. ER at 60 abduction to 45 degrees  3. PROM of shoulder flexion to 120  4. Continue to manage pain and inflammation post surgical.  5. Ability to perform scapular retraction and demonstrate upright posture entering clinic to prepare for further strengthening per protocol. Additional goals for visits 17-30        Continue to meet above unmet goals        Progress with light strengthening to try to improve AROM with functional tasks, dressing, reaching, to Prepare for RTW as hospital RN.       Improve sleep by 50%, able to sleep 4 hours without increased pain. 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 Education:  [] Yes  [x] No  [] Reviewed Prior HEP/Ed  Method of Education: [] Verbal  [] Demo  [] Written  3- pt to follow up with Dr. Antonia Cohen 3-26-20   3- issued and instructed on table flexion slides, ER with cane, scapular retraction, shoulder rolls, isometric into self - pt instructed to wait to start these until 3-18-20 6 week post op and after clearance to proceed from Dr. Antonia Cohen (Given exercise instruction ahead of schedule due to uncertainty with COVID -19 concerns and whether or not outpt PT will be disrupted)  Patient also given our phone number to call with any questions.    Comprehension of Education:  []

## 2020-06-03 ENCOUNTER — HOSPITAL ENCOUNTER (OUTPATIENT)
Dept: PHYSICAL THERAPY | Facility: CLINIC | Age: 45
Setting detail: THERAPIES SERIES
Discharge: HOME | End: 2020-06-03
Payer: COMMERCIAL

## 2020-06-03 PROCEDURE — 97110 THERAPEUTIC EXERCISES: CPT

## 2020-06-03 NOTE — FLOWSHEET NOTE
[] [facility]) - [facility] &  Therapy  [address]  [phone]  F: [phone] [x] [address]  [facility]   [address]  P: [phone]  F: [phone] [] [address]  Therapy  [address] [facility]  P: [phone]  F: [phone] [] [address]  [facility]   [address]   [place]: [phone]  F: [phone]      Physical Therapy Daily Treatment Note    Date:  6/3/2020  Patient Name:  Mitchell Canales    :  1975  MRN: [id number]  Physician: Dr. Andie Shah [facility]), unlimited visits  Medical Diagnosis: s/p right rotator cuff surgery       Rehab Codes: M25.511, M25.611, M79.621, M62.511  Onset Date: 2020 date of surgery                       Next 's appt:   2020  Visit# / total visits:      Cancels/No Shows: 2/0       Subjective:    Pain:  [x] Yes  [] No  Location: R shoulder  Pain Rating: (0-10 scale) 3/10  Pain altered Tx:  [x] No  [] Yes  Action:     Comments:  Pt c/o \"a lot of crunching\".     Objective:   Modalities:  gel shoulder sleeve to right shoulder, 15 minutes, end of treatment  Precautions:hx tear to left side also - not surgically repaired  2020 date of medium tear right rotator cuff surgery, subscapular repair, bicep tenodesis  NO aggressive or painful PROM   Exercises: bolded complete  Other:  Exercise Reps/ Time Weight/ Level Comments   UBE 3 min forward, 2 back backward L 2.2 Added 30, 5/7 increased resistance   Arm at 90 flexion, joint stabilization 20x   Added 30, pressure in lower arm, forearm    PROM to right shoulder 8 min   supine                       Wall walks, flexion and abduction 10x each   Monitoring ROM to prevent compensatory strategies, using bars on wall to slide up     able to reach top of bar

## 2020-06-08 ENCOUNTER — HOSPITAL ENCOUNTER (OUTPATIENT)
Dept: PHYSICAL THERAPY | Facility: CLINIC | Age: 45
Setting detail: THERAPIES SERIES
Discharge: HOME | End: 2020-06-08
Payer: COMMERCIAL

## 2020-06-08 PROCEDURE — 97110 THERAPEUTIC EXERCISES: CPT

## 2020-06-08 PROCEDURE — 97016 VASOPNEUMATIC DEVICE THERAPY: CPT

## 2020-06-11 ENCOUNTER — HOSPITAL ENCOUNTER (OUTPATIENT)
Dept: PHYSICAL THERAPY | Facility: CLINIC | Age: 45
Setting detail: THERAPIES SERIES
Discharge: HOME | End: 2020-06-11
Payer: COMMERCIAL

## 2020-06-11 PROCEDURE — 97110 THERAPEUTIC EXERCISES: CPT

## 2020-06-11 NOTE — FLOWSHEET NOTE
[] [facility]) - [facility] &  Therapy  [address]  [phone]  F: [phone] [x] [address]  [facility]   [address]  P: [phone]  F: [phone] [] [address]  Therapy  [address] [facility]  P: [phone]  F: [phone] [] [address]  [facility]   [address]   [place]: [phone]  F: [phone]      Physical Therapy Daily Treatment Note    Date:  2020  Patient Name:  Guanaco Romero    :  1975  MRN: [id number]  Physician: Dr. Divya [facility]), unlimited visits  Medical Diagnosis: s/p right rotator cuff surgery       Rehab Codes: M25.511, M25.611, M79.621, M62.511  Onset Date: 2020 date of surgery                       Next 's appt:   2020  Visit# / total visits:      Cancels/No Shows: 2/0       Subjective:    Pain:  [x] Yes  [] No  Location: R shoulder  Pain Rating: (0-10 scale) 2/10  Pain altered Tx:  [x] No  [] Yes  Action:    Comments:  No complaints this date. Low pain at this time.      Objective:   Modalities:  gel shoulder sleeve to right shoulder, 15 minutes, end of treatment  Precautions:hx tear to left side also - not surgically repaired  2020 date of medium tear right rotator cuff surgery, subscapular repair, bicep tenodesis  NO aggressive or painful PROM   Exercises: bolded complete  Other:  Exercise Reps/ Time Weight/ Level Comments   UBE 2.5 min forward, 2.5 back backward L 2.2 Added ,  increased resistance   Arm at 90 flexion, joint stabilization 20x   Added , pressure in lower arm, forearm    PROM to right shoulder 15 min   supine                       Wall walks, flexion and abduction 10x each   Monitoring ROM to prevent compensatory strategies, using bars on wall to slide up

## 2020-06-15 ENCOUNTER — HOSPITAL ENCOUNTER (OUTPATIENT)
Dept: PHYSICAL THERAPY | Facility: CLINIC | Age: 45
Setting detail: THERAPIES SERIES
Discharge: HOME | End: 2020-06-15
Payer: COMMERCIAL

## 2020-06-15 PROCEDURE — 97110 THERAPEUTIC EXERCISES: CPT

## 2020-06-15 NOTE — FLOWSHEET NOTE
operative pain 5- improving, taking Advil before bed  2. ? ROM: PROM in flexion to 90 degrees, PROM of ER in scapular plane to 20 degrees 5- met   3. Protect repair,    4. Patient to be independent with home exercise program as demonstrated by performance with correct form without cues. , met 3-16-20  5. Ability to sleep 4 hours in her bed (verses currently on sofa) -5/12/2020 improved with Advil, met  6.    LTG: (to be met in 16 treatments) (6 weeks post op)  1. ER in plane of scapula to 45 degrees - 5/28/2020 met  2. ER at 60 abduction to 45 degrees  3. PROM of shoulder flexion to 120  4. Continue to manage pain and inflammation post surgical.  5. Ability to perform scapular retraction and demonstrate upright posture entering clinic to prepare for further strengthening per protocol. Additional goals for visits 17-30        Continue to meet above unmet goals        Progress with light strengthening to try to improve AROM with functional tasks, dressing, reaching, to Prepare for RTW as hospital RN.       Improve sleep by 50%, able to sleep 4 hours without increased pain. 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 Education:  [] Yes  [x] No  [] Reviewed Prior HEP/Ed  Method of Education: [] Verbal  [] Demo  [] Written  3- pt to follow up with Dr. Shawn Almeida 3-26-20   3- issued and instructed on table flexion slides, ER with cane, scapular retraction, shoulder rolls, isometric into self - pt instructed to wait to start these until 3-18-20 6 week post op and after clearance to proceed from Dr. Shawn Almeida (Given exercise instruction ahead of schedule due to uncertainty with COVID -19 concerns and whether or not outpt PT will be disrupted)  6-8-200 issued blueberry band and HEP, rows, ER, abduction, extension  Patient also given our phone number to call with any questions. Comprehension of Education:  [] Verbalizes understanding.   []

## 2020-06-18 ENCOUNTER — HOSPITAL ENCOUNTER (OUTPATIENT)
Dept: PHYSICAL THERAPY | Facility: CLINIC | Age: 45
Setting detail: THERAPIES SERIES
Discharge: HOME | End: 2020-06-18
Payer: COMMERCIAL

## 2020-06-18 PROCEDURE — 97110 THERAPEUTIC EXERCISES: CPT

## 2020-06-18 NOTE — FLOWSHEET NOTE
Abduction       86                                             -3/5  IR                   To spine behind back              -4/5  ER                  45 arm at side                         -4/5  Ext                  WNL                                        4+/5     STG: (to be met in 8 treatments- first  Weeks post op)  1. ? Pain: Controlled post operative pain 5- improving, taking Advil before bed  2. ? ROM: PROM in flexion to 90 degrees, PROM of ER in scapular plane to 20 degrees 5- met   3. Protect repair,    4. Patient to be independent with home exercise program as demonstrated by performance with correct form without cues. , met 3-16-20  5. Ability to sleep 4 hours in her bed (verses currently on sofa) -5/12/2020 improved with Advil, met  6.    LTG: (to be met in 16 treatments) (6 weeks post op)  1. ER in plane of scapula to 45 degrees - 5/28/2020 met  2. ER at 60 abduction to 45 degrees  3. PROM of shoulder flexion to 120  4. Continue to manage pain and inflammation post surgical.  5. Ability to perform scapular retraction and demonstrate upright posture entering clinic to prepare for further strengthening per protocol. Additional goals for visits 17-30        Continue to meet above unmet goals        Progress with light strengthening to try to improve AROM with functional tasks, dressing, reaching, to Prepare for RTW as hospital RN.       Improve sleep by 50%, able to sleep 4 hours without increased pain. 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  Education:  [] Yes  [x] No  [] Reviewed Prior HEP/Ed  Method of Education: [] Verbal  [] Demo  [] Written  3- pt to follow up with Dr. [place] Arreola 3-26-20   3- issued and instructed on table flexion slides, ER with cane, scapular retraction, shoulder rolls, isometric into self - pt instructed to wait to start these until 3-18-20 6 week post op and after

## 2020-06-22 ENCOUNTER — HOSPITAL ENCOUNTER (OUTPATIENT)
Dept: PHYSICAL THERAPY | Facility: CLINIC | Age: 45
Setting detail: THERAPIES SERIES
Discharge: HOME | End: 2020-06-22
Payer: COMMERCIAL

## 2020-06-22 PROCEDURE — 97110 THERAPEUTIC EXERCISES: CPT

## 2020-06-22 NOTE — FLOWSHEET NOTE
op)  1. ? Pain: Controlled post operative pain 5- improving, taking Advil before bed  2. ? ROM: PROM in flexion to 90 degrees, PROM of ER in scapular plane to 20 degrees 5- met   3. Protect repair,    4. Patient to be independent with home exercise program as demonstrated by performance with correct form without cues. , met 3-16-20  5. Ability to sleep 4 hours in her bed (verses currently on sofa) -5/12/2020 improved with Advil, met  6.    LTG: (to be met in 16 treatments) (6 weeks post op)  1. ER in plane of scapula to 45 degrees - 5/28/2020 met  2. ER at 60 abduction to 45 degrees  3. PROM of shoulder flexion to 120  4. Continue to manage pain and inflammation post surgical.  5. Ability to perform scapular retraction and demonstrate upright posture entering clinic to prepare for further strengthening per protocol. Additional goals for visits 17-30        Continue to meet above unmet goals        Progress with light strengthening to try to improve AROM with functional tasks, dressing, reaching, to Prepare for RTW as hospital RN.       Improve sleep by 50%, able to sleep 4 hours without increased pain. 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 Education:  [] Yes  [x] No  [] Reviewed Prior HEP/Ed  Method of Education: [] Verbal  [] Demo  [] Written  3- pt to follow up with Dr. Shaan Brown 3-26-20   3- issued and instructed on table flexion slides, ER with cane, scapular retraction, shoulder rolls, isometric into self - pt instructed to wait to start these until 3-18-20 6 week post op and after clearance to proceed from Dr. Shaan Brown (Given exercise instruction ahead of schedule due to uncertainty with COVID -19 concerns and whether or not outpt PT will be disrupted)  6-8-200 issued blueberry band and HEP, rows, ER, abduction, extension  Patient also given our phone number to call with any questions.    Comprehension of Education:  []

## 2020-06-24 ENCOUNTER — OFFICE VISIT (OUTPATIENT)
Dept: INTERNAL MEDICINE CLINIC | Age: 45
End: 2020-06-24
Payer: COMMERCIAL

## 2020-06-24 VITALS
OXYGEN SATURATION: 97 % | HEIGHT: 62 IN | DIASTOLIC BLOOD PRESSURE: 74 MMHG | BODY MASS INDEX: 40.12 KG/M2 | TEMPERATURE: 97.8 F | HEART RATE: 62 BPM | SYSTOLIC BLOOD PRESSURE: 128 MMHG | WEIGHT: 218 LBS

## 2020-06-24 PROCEDURE — 99214 OFFICE O/P EST MOD 30 MIN: CPT | Performed by: INTERNAL MEDICINE

## 2020-06-24 RX ORDER — PHENTERMINE HYDROCHLORIDE 37.5 MG/1
37.5 TABLET ORAL
Qty: 30 TABLET | Refills: 0 | Status: SHIPPED | OUTPATIENT
Start: 2020-06-24 | End: 2020-07-24

## 2020-06-24 ASSESSMENT — ENCOUNTER SYMPTOMS
DIARRHEA: 0
WHEEZING: 0
ABDOMINAL DISTENTION: 0
EYE DISCHARGE: 0
SHORTNESS OF BREATH: 0
TROUBLE SWALLOWING: 0
BLOOD IN STOOL: 0
COUGH: 0
EYE PAIN: 0
COLOR CHANGE: 0

## 2020-06-24 NOTE — PROGRESS NOTES
Visit Information    Have you changed or started any medications since your last visit including any over-the-counter medicines, vitamins, or herbal medicines? no   Are you having any side effects from any of your medications? -  no  Have you stopped taking any of your medications? Is so, why? -  no    Have you seen any other physician or provider since your last visit? No  Have you had any other diagnostic tests since your last visit? No  Have you been seen in the emergency room and/or had an admission to a hospital since we last saw you? No  Have you had your routine dental cleaning in the past 6 months? no    Have you activated your [employer] account? If not, what are your barriers?  Yes     Patient Care Team:  Rajiv Horan MD as PCP - General (Internal Medicine)  Rajiv Horan MD as PCP - [facility]    Medical History Review  Past Medical, Family, and Social History reviewed and does not contribute to the patient presenting condition    Health Maintenance   Topic Date Due    HIV screen  06/10/1988    DTaP/Tdap/Td vaccine (1 - Tdap) 06/10/1992    Cervical cancer screen  06/10/1994    Flu vaccine (Season Ended) 09/01/2020    Diabetes screen  01/24/2023    Lipid screen  01/24/2025    Hepatitis A vaccine  Aged Out    Hepatitis B vaccine  Aged Out    Hib vaccine  Aged Out    Meningococcal (ACWY) vaccine  Aged Out    Pneumococcal 0-64 years Vaccine  Aged Out

## 2020-06-25 ENCOUNTER — HOSPITAL ENCOUNTER (OUTPATIENT)
Dept: PHYSICAL THERAPY | Facility: CLINIC | Age: 45
Setting detail: THERAPIES SERIES
Discharge: HOME | End: 2020-06-25
Payer: COMMERCIAL

## 2020-06-25 PROCEDURE — 97110 THERAPEUTIC EXERCISES: CPT

## 2020-06-25 NOTE — FLOWSHEET NOTE
-3/5  IR                   To spine behind back              -4/5  ER                  45 arm at side                         -4/5  Ext                  WNL                                        4+/5     STG: (to be met in 8 treatments- first  Weeks post op)  1. ? Pain: Controlled post operative pain 5- improving, taking Advil before bed  2. ? ROM: PROM in flexion to 90 degrees, PROM of ER in scapular plane to 20 degrees 5- met   3. Protect repair,    4. Patient to be independent with home exercise program as demonstrated by performance with correct form without cues. , met 3-16-20  5. Ability to sleep 4 hours in her bed (verses currently on sofa) -5/12/2020 improved with Advil, met  6.    LTG: (to be met in 16 treatments) (6 weeks post op)  1. ER in plane of scapula to 45 degrees - 5/28/2020 met  2. ER at 60 abduction to 45 degrees  3. PROM of shoulder flexion to 120  4. Continue to manage pain and inflammation post surgical.  5. Ability to perform scapular retraction and demonstrate upright posture entering clinic to prepare for further strengthening per protocol. Additional goals for visits 17-30        Continue to meet above unmet goals        Progress with light strengthening to try to improve AROM with functional tasks, dressing, reaching, to Prepare for RTW as hospital RN.       Improve sleep by 50%, able to sleep 4 hours without increased pain. 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  Education:  [] Yes  [x] No  [] Reviewed Prior HEP/Ed  Method of Education: [] Verbal  [] Demo  [] Written  3- pt to follow up with Dr. Shan Chacko 3-26-20   3- issued and instructed on table flexion slides, ER with cane, scapular retraction, shoulder rolls, isometric into self - pt instructed to wait to start these until 3-18-20 6 week post op and after clearance to proceed from Dr. Shan Chacko (Given exercise instruction ahead of schedule

## 2020-06-29 ENCOUNTER — HOSPITAL ENCOUNTER (OUTPATIENT)
Dept: PHYSICAL THERAPY | Facility: CLINIC | Age: 45
Setting detail: THERAPIES SERIES
Discharge: HOME | End: 2020-06-29
Payer: COMMERCIAL

## 2020-06-29 PROCEDURE — 97110 THERAPEUTIC EXERCISES: CPT

## 2020-06-29 NOTE — FLOWSHEET NOTE
Wall walks, flexion and abduction 10x each   Monitoring ROM to prevent compensatory strategies, using bars on wall to slide up    5/12 able to reach top of bar without difficulty    ball circles on wall 20x each way red, 3.3 lbs  increased reps 6/18/20   T band         Scapular rows 15x blueberry Increased resistance 6/3/20   ext 15x blueberry Increased reps 6/3/20   ER 15x blueberry Added 5/5, increased reps 6/25/20   abduction 15x blueberry Increased reps 6/25/20             Standing flexion 20x 2# right, 3# left Added 5/7, 5/14 added wt   abduction 15 0 No weight 6/3/20 for greater ROM   Bicep curls 20 3# each Increased reps 6/18/20   scaption \"Y\" 15 2# Added 5/7, 5/14 added wt    rows  15 3# each   added 5/12             Scapular retraction 15x5\"   Added 3/18-         Shoulder roll 15x   Added 3/18   Cervical rotation 10x each way 5\" hold     Seated thoracic extension 10x 5\" hold  Added 2/24/20   Standing towel stretch 15x   Added 4/16  Working towards midline         Prone         rows  15  2# Increased wt 6/29   ext 15  2# Increased wt 6/29   Horizontal abd 20 0 Increased reps 6/29, thumb down   Other:      Specific Instructions for next treatment:     Treatment Charges: Mins Units   []  Modalities- gel ice 10 0   [x]  Ther Exercise 50 3   []  Manual Therapy     []  Ther Activities     []  Aquatics     []  Vasocompression     []  Other     Total Treatment time 50 3       Assessment: [x] Progressing toward goals. Pt able to tolerate all exercises performed today. She was able to tolerate increased weight with prone rows and extension today. Pt continues to struggle with performing abduction and IR with her L shoulder. Pt did improve with all AROM and strength measurements today. [] No change. [] Other:        6-  [x]?  AROM standing right shoulder                 strength  Flexion           105                                             -4/5 Abduction       90                                             -3/5  IR                   To spine behind back              -4/5  ER                  55 arm at side                         4/5  Ext                  WNL                                        4+/5     STG: (to be met in 8 treatments- first  Weeks post op)  1. ? Pain: Controlled post operative pain 5- improving, taking Advil before bed  2. ? ROM: PROM in flexion to 90 degrees, PROM of ER in scapular plane to 20 degrees 5- met   3. Protect repair,    4. Patient to be independent with home exercise program as demonstrated by performance with correct form without cues. , met 3-16-20  5. Ability to sleep 4 hours in her bed (verses currently on sofa) -5/12/2020 improved with Advil, met  6.    LTG: (to be met in 16 treatments) (6 weeks post op)  1. ER in plane of scapula to 45 degrees - 5/28/2020 met  2. ER at 60 abduction to 45 degrees  3. PROM of shoulder flexion to 120  4. Continue to manage pain and inflammation post surgical.  5. Ability to perform scapular retraction and demonstrate upright posture entering clinic to prepare for further strengthening per protocol. Additional goals for visits 17-30        Continue to meet above unmet goals        Progress with light strengthening to try to improve AROM with functional tasks, dressing, reaching, to Prepare for RTW as hospital RN.       Improve sleep by 50%, able to sleep 4 hours without increased pain. 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  Education:  [] Yes  [x] No  [] Reviewed Prior HEP/Ed  Method of Education: [] Verbal  [] Demo  [] Written  3- pt to follow up with Dr. Antonia Cohen 3-26-20   3- issued and instructed on table flexion slides, ER with cane, scapular retraction, shoulder rolls, isometric into self - pt instructed to wait to start these until 3-18-20 6 week post op and after clearance to proceed from

## 2020-07-02 ENCOUNTER — HOSPITAL ENCOUNTER (OUTPATIENT)
Dept: PHYSICAL THERAPY | Facility: CLINIC | Age: 45
Setting detail: THERAPIES SERIES
Discharge: HOME | End: 2020-07-02
Payer: COMMERCIAL

## 2020-07-02 PROCEDURE — 97110 THERAPEUTIC EXERCISES: CPT

## 2020-07-02 NOTE — FLOWSHEET NOTE
instructed to wait to start these until 3-18-20 6 week post op and after clearance to proceed from Dr. Theodora Bliss (Given exercise instruction ahead of schedule due to uncertainty with COVID -19 concerns and whether or not outpt PT will be disrupted)  6-8-200 issued blueberry band and HEP, rows, ER, abduction, extension  Patient also given our phone number to call with any questions. Comprehension of Education:  [] Verbalizes understanding. [] Demonstrates understanding. [] Needs review. [] Demonstrates/verbalizes HEP/Ed previously given. Pendulums       Plan: [x] Continue for a total of 18 visits toward short and long term goals. [x] Specific Instructions for subsequent treatments: continue per RTC protocol.        Time In: 1005am  Time Out: 1105am     Electronically signed by:  WILDA Coleman

## 2020-07-02 NOTE — PROGRESS NOTES
[] [phone]  - . [facility] &  Therapy  [address]  [phone]  F: [phone] [x] [address]  [place] [address]   [address]  P: [phone]  F: [phone] [] [place]  [address] [facility]  P: [phone]  F: [phone] [] [address]  [facility]   [address]   [place]: [phone]  F: [phone]      Physical Therapy Progress Note    Date: 2020      Patient: Karine Marrufo  : 1975  MRN: [id number]    Physician: Dr. MACHADO                                              Insurance: [address] [place] [id number]), unlimited visits  Medical Diagnosis: s/p right rotator cuff surgery       Rehab Codes: M25.511, D47.933, M79.621, M62.511  Onset Date: 2020 date of surgery                       Next 's appt:   2020  Visit# / total visits: 30/                                Cancels/No Shows: 2/0    Subjective:  Pain:  [x]? Yes  []? No               Location: R shoulder               Pain Rating: (0-10 scale) 3/10  Pain altered Tx:  [x]? No  []? Yes  Action:     Comments:  Pt arrives stating she did 5 mile brisk walking with a lot of arm movement. Still cant hook bra. Objective:  REASSESS BY PRIMARY PT 2020  Supine  Flexion 133° A 155° P  Abduction  116° A 119° P  ER 41° A  52° P  IR At abdomen      Standing- Active  Flexion 131°  Abduction 123°  IR thumb at L%  Flexion with in range 4/5  Abduction with in range -4/5     Advil 2x per week still. Cold Pack every night    Assessment: Improved flexion and abduction of shoulder    STG: (to be met in 8 treatments- first  Weeks post op)  1. ? Pain: Controlled post operative pain 2020 improving, taking Advil before bed  2. ? ROM: PROM in flexion to 90 degrees, PROM of ER in scapular plane to 20 degrees 2020 met   3.  Protect repair,    4. Patient to be independent with home exercise program as demonstrated by performance with correct form without cues. , met 3-16-20  5. Ability to sleep 4 hours in her bed (verses currently on sofa) -5/12/2020 improved with Advil, met  6.    LTG: (to be met in 16 treatments) (6 weeks post op)  1. ER in plane of scapula to 45 degrees - 5/28/2020 met  2. ER at 60 abduction to 45 degrees met  3. PROM of shoulder flexion to 120 met  4. Continue to manage pain and inflammation post surgical. met  5. Ability to perform scapular retraction and demonstrate upright posture entering clinic to prepare for further strengthening per protocol.      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     Treatment Plan:  [x] Therapeutic Exercise   17398  [] Iontophoresis: 4 mg/mL Dexamethasone Sodium Phosphate  mAmin  43737   [x] Therapeutic Activity  31399 [] Vasopneumatic cold with compression  85857    [] Gait Training   89627 [] Ultrasound   [id number]   [] Neuromuscular Re-education  99764 [] Electrical Stimulation Unattended  80930   [x] Manual Therapy  20900 [] Electrical Stimulation Attended  18945   [x] Instruction in HEP  [] Lumbar/Cervical Traction  [id number]   [] Aquatic Therapy   50897 [x] Cold/hotpack    [] Massage   36793      [] Dry Needling, 1 or 2 muscles  63513   [] Biofeedback, first 15 minutes   02788  [] Biofeedback, additional 15 minutes   30062 [] Dry Needling, 3 or more muscles  18685       Patient Status:       [x] Additional visits necessary.         Requested Frequency/Duration: 2 times per

## 2020-07-06 ENCOUNTER — TELEPHONE (OUTPATIENT)
Dept: ORTHOPEDIC SURGERY | Age: 45
End: 2020-07-06

## 2020-07-06 ENCOUNTER — HOSPITAL ENCOUNTER (OUTPATIENT)
Dept: PHYSICAL THERAPY | Facility: CLINIC | Age: 45
Setting detail: THERAPIES SERIES
Discharge: HOME | End: 2020-07-06
Payer: COMMERCIAL

## 2020-07-06 PROCEDURE — 97110 THERAPEUTIC EXERCISES: CPT

## 2020-07-06 NOTE — FLOWSHEET NOTE
[] [facility]) - [facility] &  Therapy  [address]  [phone]  F: [phone] [x] [address]  [facility] [address]   [address]  P: [phone]  F: [phone] [] [address] &  Therapy  [address] [facility]  P: [phone]  F: [phone] [] [address]  [facility]   [address]   [place]: [phone]  F: [phone]      Physical Therapy Daily Treatment Note    Date:  2020  Patient Name:  Alis Perdomo    :  1975  MRN: [id number]  Physician: Dr. Kadie Tejada: [facility]), unlimited visits  Medical Diagnosis: s/p right rotator cuff surgery       Rehab Codes: M25.511, M25.611, M79.621, M62.511  Onset Date: 2020 date of surgery                       Next 's appt:   2020  Visit# / total visits:      Cancels/No Shows: 2/0         Subjective:    Pain:  [x] Yes  [] No  Location: R shoulder  Pain Rating: (0-10 scale) 5/10  Pain altered Tx:  [x] No  [] Yes  Action:    Comments:  Pt c/o feeling more achy this morning, unsure why.     Objective:   Modalities:  gel shoulder sleeve to right shoulder, 15 minutes, end of treatment  Precautions:hx tear to left side also - not surgically repaired  2020 date of medium tear right rotator cuff surgery, subscapular repair, bicep tenodesis  NO aggressive or painful PROM   Exercises: bolded complete  Exercise Reps/ Time Weight/ Level Comments   UBE 2.5 min forward, 2.5 back backward L 2.0 Added ,  increased resistance     Arm at 90 flexion, joint stabilization 20x   Added , pressure in lower arm, forearm      PROM to right shoulder 10 min   Supine                       Wall walks, flexion and abduction 10x each   Monitoring ROM to prevent compensatory strategies, using bars on wall to slide up     able to reach top of bar without difficulty   ball circles on wall 20x each way red, 3.3 lbs  increased reps 6/18/20   T band         Scapular rows 20x blueberry Increased resistance 6/3/20 Progressed resistance 7/2   ext 20x blueberry Increased reps 6/3/20 Progressed resistance 7/2   ER 20x blueberry Added 5/5, increased reps 6/25/20 Progressed resistance 7/2   abduction 20x blueberry Increased reps 6/25/20 Progressed resistance 7/2             Standing flexion 20x 2# right, 3# left Added 5/7, 5/14 added wt   abduction 15 0 No weight 6/3/20 for greater ROM   Bicep curls 20 3# each Increased reps 6/18/20   scaption \"Y\" 15 2# Added 5/7, 5/14 added wt   [facility] 3# each  added 5/12             Scapular retraction 15x5\"   Added 3/18-         Shoulder roll 15x   Added 3/18   Cervical rotation 10x each way 5\" hold     Seated thoracic extension 10x 5\" hold  Added 2/24/20   Standing towel stretch 15x   Added 4/16  Working towards midline         Prone         rows  15  2# Increased wt 6/29   ext 15  2# Increased wt 6/29   Horizontal abd 20 0 Increased reps 6/29, thumb down   Other:    REASSESS BY PRIMARY PT 7/2/2020  Supine  Flexion 133° A 155° P  Abduction  116° A 119° P  ER 41° A  52° P  IR At abdomen     Standing- Active  Flexion 131°  Abduction 123°  IR thumb at L%  Flexion with in range 4/5  Abduction with in range -4/5    Advil 2x per week still. 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 Pt continues to tolerate exercises well.  Started with PROM this date for stretching d/t pt c/o feeling more stiff. Pt reports feeling improved following PT session. [] No change. [] Other:       STG: (to be met in 8 treatments- first  Weeks post op)  1. ? Pain: Controlled post operative pain 5- improving, taking Advil before bed  2. ? ROM: PROM in flexion to 90 degrees, PROM of ER in scapular plane to 20 degrees 5- met   3. Protect repair,    4. Patient to be independent with home exercise program as demonstrated by performance with correct form without cues. , met 3-16-20  5. Ability to sleep 4 hours in her bed (verses currently on sofa) -5/12/2020 improved with Advil, met  6.    LTG: (to be met in 16 treatments) (6 weeks post op)  1. ER in plane of scapula to 45 degrees - 5/28/2020 met  2. ER at 60 abduction to 45 degrees met  3. PROM of shoulder flexion to 120 met  4. Continue to manage pain and inflammation post surgical. met  5. Ability to perform scapular retraction and demonstrate upright posture entering clinic to prepare for further strengthening per protocol. 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  Education:  [] Yes  [x] No  [] Reviewed Prior HEP/Ed  Method of Education: [] Verbal  [] Demo  [] Written  3- pt to follow up with Dr. Rubens Saenz 3-26-20   3- issued and instructed on table flexion slides, ER with cane, scapular retraction, shoulder rolls, isometric into self - pt instructed to wait to start these until 3-18-20 6 week post op and after clearance to proceed from Dr. Rubens Saenz (Given exercise instruction ahead of schedule due to uncertainty with COVID -19 concerns and whether or not outpt PT will be disrupted)  6-8-200 issued blueberry band and HEP, rows, ER, abduction, extension  Patient also given our phone number to call with any questions. Comprehension of Education:  [] Verbalizes understanding. [] Demonstrates understanding. [] Needs review. [] Demonstrates/verbalizes HEP/Ed previously given. Pendulums       Plan: [x] Continue for a total of 18 visits toward short and long term goals. [x] Specific Instructions for subsequent treatments: continue per RTC protocol.        Time In: 12:05pm  Time Out: 1:00pm     Electronically signed by:  Марина Galindo PTA

## 2020-07-08 ENCOUNTER — HOSPITAL ENCOUNTER (OUTPATIENT)
Dept: PHYSICAL THERAPY | Facility: CLINIC | Age: 45
Setting detail: THERAPIES SERIES
Discharge: HOME | End: 2020-07-08
Payer: COMMERCIAL

## 2020-07-08 PROCEDURE — 97530 THERAPEUTIC ACTIVITIES: CPT

## 2020-07-08 PROCEDURE — 97110 THERAPEUTIC EXERCISES: CPT

## 2020-07-08 NOTE — FLOWSHEET NOTE
[] [facility]) - [facility] &  Therapy  [address]  [phone]  F: [phone] [x] [address]  [facility]   [address]  P: [phone]  F: [phone] [] [address] &  Therapy  [address] [facility]  P: [phone]  F: [phone] [] [address]  [facility]   [address]   [place]: [phone]  F: [phone]      Physical Therapy Daily Treatment Note    Date:  2020  Patient Name:  Perla Tinoco    :  1975  MRN: [id number]  Physician: Dr. Lebron Fernando: Kathe [facility]), unlimited visits  Medical Diagnosis: s/p right rotator cuff surgery       Rehab Codes: M25.511, M25.611, M79.621, M62.511  Onset Date: 2020 date of surgery                       Next 's appt:   2020  Visit# / total visits: 32/38     Cancels/No Shows: 2/0         Subjective:    Pain:  [x] Yes  [] No  Location: R shoulder  Pain Rating: (0-10 scale) 4/10  Pain altered Tx:  [x] No  [] Yes  Action:    Comments:  Pt states she is little sore upon arrival and did not sleep well the night prior to treatment. Pt states she went to jessica class, walked long distance, and went swimming.      Objective:   Modalities:  gel shoulder sleeve to right shoulder, 15 minutes, end of treatment  Precautions:hx tear to left side also - not surgically repaired  2020 date of medium tear right rotator cuff surgery, subscapular repair, bicep tenodesis  NO aggressive or painful PROM   Exercises: bolded complete  Exercise Reps/ Time Weight/ Level Comments   UBE 2.5 min forward, 2.5 back backward L 2.0 Added 4/30, 5/7 increased resistance     Arm at 90 flexion, joint stabilization 20x   Added 30, pressure in lower arm, forearm      PROM to right shoulder 10 min   Supine             weighted [employer] stimulation- ther activity  10x 3.3# red ball  to simulate IV pole added 7/8   Wall walks, flexion and abduction 10x each   Monitoring ROM to prevent compensatory strategies, using bars on wall to slide up    5/12 able to reach top of bar without difficulty   ball circles on wall 20x each way red, 3.3 lbs  increased reps 6/18/20   T band         Scapular rows 20x blueberry Increased resistance 6/3/20 Progressed resistance 7/2   ext 20x blueberry Increased reps 6/3/20 Progressed resistance 7/2   ER 20x blueberry Added 5/5, increased reps 6/25/20 Progressed resistance 7/2   abduction 20x blueberry Increased reps 6/25/20 Progressed resistance 7/2             Standing flexion 20x 2# right, 3# left Added 5/7, 5/14 added wt   abduction 15 0 No weight 6/3/20 for greater ROM   Bicep curls 20 3# each Increased reps 6/18/20   scaption \"Y\" 15 2# Added 5/7, 5/14 added wt    rows  15 3# each  added 5/12             Scapular retraction 15x5\"   Added 3/18-         Shoulder roll 15x   Added 3/18   Cervical rotation 10x each way 5\" hold     Seated thoracic extension 10x 5\" hold  Added 2/24/20   Standing towel stretch 15x   Added 4/16  Working towards midline         Prone         rows  15  2# Increased wt 6/29   ext 15  2# Increased wt 6/29   Horizontal abd 20 0 Increased reps 6/29, thumb down   Other:    REASSESS BY PRIMARY PT 7/2/2020  Supine  Flexion 133° A 155° P  Abduction  116° A 119° P  ER 41° A  52° P  IR At abdomen     Standing- Active  Flexion 131°  Abduction 123°  IR thumb at L%  Flexion with in range 4/5  Abduction with in range -4/5    Advil 2x per week still.    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 Pt continues to tolerate therapeutic exercises well. Stimulation for IV pole was added to improve strength/mobility and aid in returning to work. Ended treatment session with PROM to increase ROM. Pt eager to improve ROM and frequently pushes herself throughout session. [] No change. [] Other:       STG: (to be met in 8 treatments- first  Weeks post op)  1. ? Pain: Controlled post operative pain 5- improving, taking Advil before bed  2. ? ROM: PROM in flexion to 90 degrees, PROM of ER in scapular plane to 20 degrees 5- met   3. Protect repair,    4. Patient to be independent with home exercise program as demonstrated by performance with correct form without cues. , met 3-16-20  5. Ability to sleep 4 hours in her bed (verses currently on sofa) -5/12/2020 improved with Advil, met  6.    LTG: (to be met in 16 treatments) (6 weeks post op)  1. ER in plane of scapula to 45 degrees - 5/28/2020 met  2. ER at 60 abduction to 45 degrees met  3. PROM of shoulder flexion to 120 met  4. Continue to manage pain and inflammation post surgical. met  5. Ability to perform scapular retraction and demonstrate upright posture entering clinic to prepare for further strengthening per protocol. 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  Education:  [] Yes  [x] No  [] Reviewed Prior HEP/Ed  Method of Education: [] Verbal  [] Demo  [] Written  3- pt to follow up with Dr. Delfina Méndez 3-26-20   3- issued and instructed on table flexion slides, ER with cane, scapular retraction, shoulder rolls, isometric into self - pt instructed to wait to start these until 3-18-20 6 week post op and after clearance to proceed from Dr. Delfina Méndez (Given exercise instruction ahead of schedule due to uncertainty with COVID -19 concerns and whether or not outpt PT will be disrupted)  6-8-200 issued blueberry band and HEP, rows, ER, abduction, extension  Patient also given our phone number to call with any questions. Comprehension of Education:  [] Verbalizes understanding. [] Demonstrates understanding. [] Needs review. [x] Demonstrates/verbalizes HEP/Ed previously given. Pendulums       Plan: [x] Continue for a total of 18 visits toward short and long term goals. [x] Specific Instructions for subsequent treatments: continue per RTC protocol.        Time In: 10:52am  Time Out: 11:56am     Electronically signed by:  Micheal Farr PTA

## 2020-07-09 ENCOUNTER — APPOINTMENT (OUTPATIENT)
Dept: PHYSICAL THERAPY | Facility: CLINIC | Age: 45
End: 2020-07-09
Payer: COMMERCIAL

## 2020-07-10 ENCOUNTER — TELEPHONE (OUTPATIENT)
Dept: ORTHOPEDIC SURGERY | Age: 45
End: 2020-07-10

## 2020-07-10 NOTE — TELEPHONE ENCOUNTER
Patient would like a muscle relaxer (Flexeril) called in for her if possible. Did something new in PT and it hurt her Right arm. She has been unable to use it properly since Wednesday.     Jesus:  [facility] on [address] [facility]

## 2020-07-13 ENCOUNTER — HOSPITAL ENCOUNTER (OUTPATIENT)
Dept: PHYSICAL THERAPY | Facility: CLINIC | Age: 45
Setting detail: THERAPIES SERIES
Discharge: HOME | End: 2020-07-13
Payer: COMMERCIAL

## 2020-07-13 PROCEDURE — 97530 THERAPEUTIC ACTIVITIES: CPT

## 2020-07-13 PROCEDURE — 97110 THERAPEUTIC EXERCISES: CPT

## 2020-07-13 NOTE — FLOWSHEET NOTE
[] [facility]) [facility] &  Therapy  [address]  [phone]  F: [phone] [x] [address]  [facility]   [address]  P: [phone]  F: [phone] [] [address] &  Therapy  [address] [facility]  P: [phone]  F: [phone] [] [address]  [facility]   [address]   [place]: [phone]  F: [phone]      Physical Therapy Daily Treatment Note    Date:  2020  Patient Name:  Babak Gaston    :  1975  MRN: [id number]  Physician: Dr. Dion Cantu: [facility]), unlimited visits  Medical Diagnosis: s/p right rotator cuff surgery       Rehab Codes: M25.511, M25.611, M79.621, M62.511  Onset Date: 2020 date of surgery                       Next 's appt:   2020  Visit# / total visits: 33/38     Cancels/No Shows: 2/0         Subjective:    Pain:  [x] Yes  [] No  Location: R shoulder  Pain Rating: (0-10 scale) 4/10  Pain altered Tx:  [x] No  [] Yes  Action:    Comments: Pt states that the shelf stimulation with 3.3# ball increased pain symptoms post treatment. Pt states that her pain level was 10/10 on Wednesday, Thursday, and Friday. Saturday her pain was a 7/10 and  was a 4/10. States she called her doctor to try and get pain meds due to severity of discomfort. Pt reports she used a lot of ice and motrin, however motrin did not help. Sleep was disturbed since last treatment. Pt reports she cried frequently over the weekend due to pain and frustration of injury.      Objective:   Modalities:  gel shoulder sleeve to right shoulder, 15 minutes, end of treatment  Precautions:hx tear to left side also - not surgically repaired  2020 date of medium tear right rotator cuff surgery, subscapular repair, bicep tenodesis  Exercises: bolded complete  Exercise Reps/ Time Weight/ Level Comments   UBE 2.5 min forward, 2.5 back backward L 2.0 Added 4/30, 5/7 increased resistance     Arm at 90 flexion, joint stabilization 20x   Added 4/30, pressure in lower arm, forearm      PROM to right shoulder 10 min   Supine             weighted ball on shelf stimulation- ther activity  10x  10x A  1.1# white ball  to simulate IV pole added 7/8 Regressed 7/13.  Intermittent rest breaks at top of shelf to stretch    Wall walks, flexion and abduction 10x each   Monitoring ROM to prevent compensatory strategies, using bars on wall to slide up    5/12 able to reach top of bar without difficulty   ball circles on wall 20x each way red, 3.3 lbs  increased reps 6/18/20   T band         Scapular rows 20x blueberry Increased resistance 6/3/20 Progressed resistance 7/2   ext 20x blueberry Increased reps 6/3/20 Progressed resistance 7/2   ER 20x blueberry Added 5/5, increased reps 6/25/20 Progressed resistance 7/2   abduction 20x blueberry Increased reps 6/25/20 Progressed resistance 7/2             Standing flexion 20x 2# right, 3# left Added 5/7, 5/14 added wt   abduction 15x 0 No weight 6/3/20 for greater ROM   Bicep curls 20x 3# each Increased reps 6/18/20   scaption \"Y\" 20x 2# Added 5/7, 5/14 added wt Increased reps 7/13    rows 20x 3# each  added 5/12 Increased reps 7/13             Scapular retraction 15x5\"   Added 3/18-         Shoulder roll 15x   Added 3/18   Cervical rotation 10x each way 5\" hold     Seated thoracic extension 10x 5\" hold  Added 2/24/20   Standing towel stretch 15x   Added 4/16  Working towards midline         Prone         rows 15x 3# Increased wt 6/29 Increased wt 7/13   ext 15x 3# Increased wt 6/29 increased wt 7/13   Horizontal abd 20x 0 Increased reps 6/29, thumb down   Other:    REASSESS BY PRIMARY PT 7/2/2020  Supine  Flexion 133° A 155° P  Abduction  116° A 119° P  ER 41° A  52° P  IR At abdomen     Standing- Active  Flexion 131°  Abduction 17-30        Continue to meet above unmet goals        Progress with light strengthening to try to improve AROM with functional tasks, dressing, reaching, 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 Education:  [] Yes  [x] No  [] Reviewed Prior HEP/Ed  Method of Education: [] Verbal  [] Demo  [] Written  3- pt to follow up with Dr. River Jack 3-26-20   3- issued and instructed on table flexion slides, ER with cane, scapular retraction, shoulder rolls, isometric into self - pt instructed to wait to start these until 3-18-20 6 week post op and after clearance to proceed from Dr. River Jack (Given exercise instruction ahead of schedule due to uncertainty with COVID -19 concerns and whether or not outpt PT will be disrupted)  6-8-200 issued blueberry band and HEP, rows, ER, abduction, extension  Patient also given our phone number to call with any questions. Comprehension of Education:  [] Verbalizes understanding. [] Demonstrates understanding. [] Needs review. [x] Demonstrates/verbalizes HEP/Ed previously given. Pendulums       Plan: [x] Continue for a total of 18 visits toward short and long term goals. [x] Specific Instructions for subsequent treatments: continue per RTC protocol.        Time In: 11:13am  Time Out: 12:11am     Electronically signed by:  Navarro JOSEPH  Entirety of treatment was under direct supervision of [place], [place]

## 2020-07-20 ENCOUNTER — HOSPITAL ENCOUNTER (OUTPATIENT)
Dept: PHYSICAL THERAPY | Facility: CLINIC | Age: 45
Setting detail: THERAPIES SERIES
Discharge: HOME | End: 2020-07-20
Payer: COMMERCIAL

## 2020-07-20 PROCEDURE — 97530 THERAPEUTIC ACTIVITIES: CPT

## 2020-07-20 PROCEDURE — 97110 THERAPEUTIC EXERCISES: CPT

## 2020-07-20 NOTE — PROGRESS NOTES
[] [facility]) [facility] &  Therapy  [address]  [phone]  F: [phone] [x] [address]  [facility]   [address]  P: [phone]  F: [phone] [] [place]  [address] [facility]  P: [phone]  F: [phone] [] [address]  [facility]   [address]   [place]: [phone]  F: [phone]      Physical Therapy Progress Note    Date: 2020      Patient: Nixon Sun  : 1975  MRN: [id number]    Physician:  [address]: [address] [facility]), unlimited visits  Medical Diagnosis: s/p right rotator cuff surgery       Rehab Codes: M25.511, P80.592, M79.621, M62.511  Onset Date: 2020 date of surgery                       Next 's appt:   2020  Visit# / total visits: 35/38                                Cancels/No Shows: 2/0     Matt:  [x]? Yes  []? No               Location: R shoulder               Pain Rating: (0-10 scale) 1-2/10  Pain altered Tx:  [x]? No  []? Yes  Action:     Comments: Pt states shoulder \"feels good\" went swimming more and did okay    Objective:  REASSESS BY PRIMARY PT 7/15/2020   Standing- Active  Flexion 143°  Abduction 85°  IR thumb at L2  Flexion with in range 4/5  Abduction with in range -4/5     No Advil, icing more when muscles are sore  Took Tylenol 3x the past week for muscle soreness, no Tylenol x3 days    Assessment:  Progressing toward goals. Verbal cueing not to swing weights but lift controlled. Patient did well with work simulation tasks today , increased pace so added 10 reps. Observed less compensatory motions with exercises such as arching of thoracic spine. Spent more time on stretching to improve ABD range today.      STG: (to be met in 8 treatments- first  Weeks post op)  1. ? Pain: Controlled post operative pain 5- improving, taking Advil before bed  2. ? ROM: PROM in flexion to 90 degrees, PROM of ER in scapular plane to 20 degrees 5- met   3. Protect repair,    4. Patient to be independent with home exercise program as demonstrated by performance with correct form without cues. , met 3-16-20  5. Ability to sleep 4 hours in her bed (verses currently on sofa) -5/12/2020 improved with Advil, met  6.    LTG: (to be met in 16 treatments) (6 weeks post op)  1. ER in plane of scapula to 45 degrees - 5/28/2020 met  2. ER at 60 abduction to 45 degrees met  3. PROM of shoulder flexion to 120 met  4. Continue to manage pain and inflammation post surgical. met  5. Ability to perform scapular retraction and demonstrate upright posture entering clinic to prepare for further strengthening per protocol.      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  Treatment Plan:  [x] Therapeutic Exercise   68476  [] Iontophoresis: 4 mg/mL Dexamethasone Sodium Phosphate  mAmin  35216   [x] Therapeutic Activity  34865 [x] Vasopneumatic cold with compression  38019    [] Gait Training   37332 [] Ultrasound   [id number]   [] Neuromuscular Re-education  90221 [] Electrical Stimulation Unattended  15931   [x] Manual Therapy  65902 [] Electrical Stimulation Attended  [id number]   [x] Instruction in HEP  [] Lumbar/Cervical Traction  [id number]   [] Aquatic Therapy   99108 [x] Cold/hotpack    [] Massage   18636      [] Dry Needling, 1 or 2 muscles  35426   [] Biofeedback, first 15 minutes   [id number]  [] Biofeedback, additional 15 minutes   28759 [] Dry Needling, 3 or more muscles  20561     She has 3 visits left on current plan if she chooses to schedule. Electronically signed by Chris Mancini, VICENTA on 7/20/2020 at 12:50 PM      If you have any questions or concerns, please don't hesitate to call.   Thank you for your referral.

## 2020-07-20 NOTE — FLOWSHEET NOTE
[] [facility]) - [facility] &  Therapy  [address]  [phone]  F: [phone] [x] [address]  [facility] 36   [address]  P: [phone]  F: [phone] [] [address] &  Therapy  [address] [facility]  P: [phone]  F: [phone] [] [address]  [facility]   [address]   [place]: [phone]  F: [phone]      Physical Therapy Daily Treatment Note    Date:  2020  Patient Name:  Kameron Schneider    :  1975  MRN: [id number]  Physician: Dr. Tina Real [facility]), unlimited visits  Medical Diagnosis: s/p right rotator cuff surgery       Rehab Codes: M25.511, M25.611, M79.621, M62.511  Onset Date: 2020 date of surgery                       Next 's appt:   2020  Visit# / total visits: 35/38     Cancels/No Shows: 2/0     2020 progress note sent to Dr. Maryam Wesley    Subjective:    Pain:  [x] Yes  [] No  Location: R shoulder  Pain Rating: (0-10 scale) 1-2/10  Pain altered Tx:  [x] No  [] Yes  Action:    Comments: Pt states shoulder \"feels good\" went swimming more and did okay.      Objective:   Modalities:  gel shoulder sleeve to right shoulder, 10 minutes, end of treatment  Precautions:hx tear to left side also - not surgically repaired  2020 date of medium tear right rotator cuff surgery, subscapular repair, bicep tenodesis  Exercises: bolded complete  Exercise Reps/ Time Weight/ Level Comments   UBE 2.5 min forward, 2.5 back backward L 2.0 Added 4/30, 5/7 increased resistance     Arm at 90 flexion, joint stabilization 20x   Added 4/30, pressure in lower arm, forearm      PROM to right shoulder 10 min   Supine, flexion, focus on abduction             weighted ball on shelf stimulation- ther activity  10x 3   A  1.1# white ball  to simulate IV pole added 7/8 Regressed 7/13.  Intermittent rest breaks at top of shelf to stretch    Wall walks, flexion and abduction 10x each   Monitoring ROM to prevent compensatory strategies, using bars on wall to slide up    5/12 able to reach top of bar without difficulty   ball circles on wall  20x each way red, 3.3 lbs  increased reps 6/18/20   T band         Scapular rows 20x blueberry Increased resistance 6/3/20 Progressed resistance 7/2   ext 20x blueberry Increased reps 6/3/20 Progressed resistance 7/2   ER 20x blueberry Added 5/5, increased reps 6/25/20 Progressed resistance 7/2   abduction 20x blueberry Increased reps 6/25/20 Progressed resistance 7/2             Standing flexion 20x 2# right, 3# left Added 5/7, 5/14 added wt   abduction 15x 0 No weight 6/3/20 for greater ROM   Bicep curls 20x 3# each Increased reps 6/18/20   scaption \"Y\" 20x 2# Added 5/7, 5/14 added wt Increased reps 7/13    rows 20x 3# each  added 5/12 Increased reps 7/13             Scapular retraction 15x5\"   Added 3/18-         Shoulder roll 15x   Added 3/18   Cervical rotation 10x each way 5\" hold     Seated thoracic extension 10x 5\" hold  Added 2/24/20   Standing towel stretch 15x   Added 4/16  Working towards midline         Prone         rows 15x 3# Increased wt 6/29 Increased wt 7/13   ext 15x 3# Increased wt 6/29 increased wt 7/13   Horizontal abd 20x 0 Increased reps 6/29, thumb down   Other:    REASSESS BY PRIMARY PT 7/15/2020   Standing- Active  Flexion 143°  Abduction 85°  IR thumb at L2  Flexion with in range 4/5  Abduction with in range -4/5    No Advil, icing more when muscles are sore  Took Tylenol 3x the past week for muscle soreness, no Tylenol x3 days      Specific Instructions for next treatment:   Add  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 Thony Alcantar 3-26-20   3- issued and instructed on table flexion slides, ER with cane, scapular retraction, shoulder rolls, isometric into self - pt instructed to wait to start these until 3-18-20 6 week post op and after clearance to proceed from Dr. Thony Alcantar (Given exercise instruction ahead of schedule due to uncertainty with COVID -19 concerns and whether or not outpt PT will be disrupted)  6-8-200 issued blueberry band and HEP, rows, ER, abduction, extension  Patient also given our phone number to call with any questions. Comprehension of Education:  [] Verbalizes understanding. [] Demonstrates understanding. [] Needs review. [x] Demonstrates/verbalizes HEP/Ed previously given. Pendulums       Plan: [x] Continue for a total of 18 visits toward short and long term goals. [x] Specific Instructions for subsequent treatments: continue per RTC protocol.        Time In: 11:11 am Time Out: 12:10 pm     Electronically signed by:  Claudell Quan, PT

## 2020-07-23 ENCOUNTER — HOSPITAL ENCOUNTER (OUTPATIENT)
Dept: PHYSICAL THERAPY | Facility: CLINIC | Age: 45
Setting detail: THERAPIES SERIES
Discharge: HOME | End: 2020-07-23
Payer: COMMERCIAL

## 2020-07-23 PROCEDURE — 97110 THERAPEUTIC EXERCISES: CPT

## 2020-07-23 NOTE — FLOWSHEET NOTE
[] [facility] &  Therapy  [address]  [phone]  F: [phone] [x] [address]  [facility]   [address]  P: [phone]  F: [phone] [] [address] &  Therapy  [address]  P: [phone]  F: [phone] [] [address]  [facility]   [address]   [place]: [phone]  F: [phone]      Physical Therapy Daily Treatment Note    Date:  2020  Patient Name:  Alka Maki    :  1975  MRN: [id number]  Physician: Dr. Brandon Starr [facility]), unlimited visits  Medical Diagnosis: s/p right rotator cuff surgery       Rehab Codes: M25.511, M25.611, M79.621, M62.511  Onset Date: 2020 date of surgery                       Next 's appt:   2020  Visit# / total visits: 36/38     Cancels/No Shows: 2/0     2020 progress note sent to Dr. Lele Felix    Subjective:    Pain:  [x] Yes  [] No  Location: R shoulder  Pain Rating: (0-10 scale) 1/10  Pain altered Tx:  [x] No  [] Yes  Action:    Comments: At rest pt describes her shoulder to feel \"dull and stiff\". Pain continues to get up to 3/10.                                                                                                                                                                                                                                                                                                                                                                                            Objective:   Modalities:  gel shoulder sleeve to right shoulder, 10 minutes, end of treatment  Precautions:hx tear to left side also - not surgically repaired  2020 date of medium tear right rotator cuff surgery, subscapular repair, bicep tenodesis  Exercises: bolded complete  Exercise Reps/ Time Weight/ Level Comments   UBE 2.5 min forward, 2.5 back backward L 2.0 Added 4/30, 5/7 increased resistance     Arm at 90 flexion, joint stabilization 20x   Added 4/30, pressure in lower arm, forearm      PROM to right shoulder 10 min   Supine, flexion, focus on abduction             weighted ball on shelf stimulation- ther activity  10x 3   A  1.1# white ball  to simulate IV pole added 7/8 Regressed 7/13.  Intermittent rest breaks at top of shelf to stretch    Wall walks, flexion and abduction 10x each   Monitoring ROM to prevent compensatory strategies, using bars on wall to slide up    5/12 able to reach top of bar without difficulty   ball circles on wall  20x each way red, 3.3 lbs  increased reps 6/18/20   T band         Scapular rows 20x blueberry Increased resistance 6/3/20 Progressed resistance 7/2   ext 20x blueberry Increased reps 6/3/20 Progressed resistance 7/2   ER 20x blueberry Added 5/5, increased reps 6/25/20 Progressed resistance 7/2   abduction 20x blueberry Increased reps 6/25/20 Progressed resistance 7/2             Standing flexion 20x 2# right, 3# left Added 5/7, 5/14 added wt   abduction 15x 0 No weight 6/3/20 for greater ROM   Bicep curls 20x 3# each Increased reps 6/18/20   scaption \"Y\" 20x 2# Added 5/7, 5/14 added wt Increased reps 7/13    rows 20x 3# each  added 5/12 Increased reps 7/13             Scapular retraction 15x5\"   Added 3/18-         Shoulder roll 15x   Added 3/18   Cervical rotation 10x each way 5\" hold     Seated thoracic extension 10x 5\" hold  Added 2/24/20   Standing towel stretch 15x   Added 4/16  Working towards midline         Prone         rows 15x 3# Increased wt 6/29 Increased wt 7/13   ext 15x 3# Increased wt 6/29 increased wt 7/13   Horizontal abd 20x 0 Increased reps 6/29, thumb down   Other:    REASSESS BY PRIMARY PT 7/15/2020   Standing- Active  Flexion 143°  Abduction 85°  IR thumb at L2  Flexion with in range 4/5  Abduction with in range -4/5    No Advil, icing more when muscles are sore  Took Tylenol 3x the past week for muscle soreness, no Tylenol x3 days          Treatment Charges: Mins Units   [x]  Modalities- gel ice 10 0   [x]  Ther Exercise 50 3   []  Manual Therapy     []  Ther Activities     []  Aquatics     []  Vasocompression     []  Other     Total Treatment time 50 3       Assessment: [x] Progressing toward goals. Pt tight to start PROM. Once into the exercise, tightness improves. Pt with improved tolerance to IV pole simulated exercises stating the 1.1# ball is feeling much easier. Will attempt heavier weight for this exercise at next session. [] No change. [] Other:       STG: (to be met in 8 treatments- first  Weeks post op)  1. ? Pain: Controlled post operative pain 5- improving, taking Advil before bed  2. ? ROM: PROM in flexion to 90 degrees, PROM of ER in scapular plane to 20 degrees 5- met   3. Protect repair,    4. Patient to be independent with home exercise program as demonstrated by performance with correct form without cues. , met 3-16-20  5. Ability to sleep 4 hours in her bed (verses currently on sofa) -5/12/2020 improved with Advil, met  6.    LTG: (to be met in 16 treatments) (6 weeks post op)  1. ER in plane of scapula to 45 degrees - 5/28/2020 met  2. ER at 60 abduction to 45 degrees met  3. PROM of shoulder flexion to 120 met  4. Continue to manage pain and inflammation post surgical. met  5. Ability to perform scapular retraction and demonstrate upright posture entering clinic to prepare for further strengthening per protocol. 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 Education:  [] Yes  [x] No  [] Reviewed Prior HEP/Ed  Method of Education: [] Verbal  [] Demo  [] Written  3- pt to follow up with Dr. Rogelio Weldon 3-26-20   3- issued and instructed on table flexion slides, ER with cane, scapular retraction, shoulder rolls, isometric into self - pt instructed to wait to start these until 3-18-20 6 week post op and after clearance to proceed from Dr. Rogelio Weldon (Given exercise instruction ahead of schedule due to uncertainty with COVID -19 concerns and whether or not outpt PT will be disrupted)  6-8-200 issued blueberry band and HEP, rows, ER, abduction, extension  Patient also given our phone number to call with any questions. Comprehension of Education:  [] Verbalizes understanding. [] Demonstrates understanding. [] Needs review. [x] Demonstrates/verbalizes HEP/Ed previously given. Pendulums       Plan: [x] Continue for a total of 18 visits toward short and long term goals.   [x] Specific Instructions for subsequent treatments: increase weight for IV pole simulated exercise, try 2.2# ball      Time In: 10:01am   Time Out: 11:06am     Electronically signed by:  Maxime Hartmann PTA

## 2020-07-27 ENCOUNTER — OFFICE VISIT (OUTPATIENT)
Dept: INTERNAL MEDICINE CLINIC | Age: 45
End: 2020-07-27
Payer: COMMERCIAL

## 2020-07-27 ENCOUNTER — TELEPHONE (OUTPATIENT)
Dept: ORTHOPEDIC SURGERY | Age: 45
End: 2020-07-27

## 2020-07-27 ENCOUNTER — OFFICE VISIT (OUTPATIENT)
Dept: ORTHOPEDIC SURGERY | Age: 45
End: 2020-07-27
Payer: COMMERCIAL

## 2020-07-27 VITALS
DIASTOLIC BLOOD PRESSURE: 72 MMHG | SYSTOLIC BLOOD PRESSURE: 130 MMHG | BODY MASS INDEX: 39.2 KG/M2 | HEIGHT: 62 IN | WEIGHT: 213 LBS | OXYGEN SATURATION: 97 % | TEMPERATURE: 97.7 F | HEART RATE: 68 BPM

## 2020-07-27 VITALS — HEIGHT: 62 IN | WEIGHT: 213 LBS | BODY MASS INDEX: 39.2 KG/M2

## 2020-07-27 PROBLEM — E66.01 MORBIDLY OBESE (HCC): Status: ACTIVE | Noted: 2020-07-27

## 2020-07-27 PROBLEM — E66.9 OBESITY (BMI 30-39.9): Status: RESOLVED | Noted: 2020-01-22 | Resolved: 2020-07-27

## 2020-07-27 PROCEDURE — 99214 OFFICE O/P EST MOD 30 MIN: CPT | Performed by: INTERNAL MEDICINE

## 2020-07-27 PROCEDURE — 99213 OFFICE O/P EST LOW 20 MIN: CPT | Performed by: ORTHOPAEDIC SURGERY

## 2020-07-27 RX ORDER — PHENTERMINE HYDROCHLORIDE 37.5 MG/1
37.5 TABLET ORAL
Qty: 30 TABLET | Refills: 0 | Status: SHIPPED | OUTPATIENT
Start: 2020-07-27 | End: 2020-08-26

## 2020-07-27 RX ORDER — PHENTERMINE HYDROCHLORIDE 37.5 MG/1
37.5 CAPSULE ORAL EVERY MORNING
COMMUNITY
End: 2021-07-19

## 2020-07-27 ASSESSMENT — ENCOUNTER SYMPTOMS
EYE DISCHARGE: 0
WHEEZING: 0
COUGH: 0
BLOOD IN STOOL: 0
SHORTNESS OF BREATH: 0
ABDOMINAL DISTENTION: 0
DIARRHEA: 0
TROUBLE SWALLOWING: 0
EYE PAIN: 0
COLOR CHANGE: 0

## 2020-07-27 NOTE — PROGRESS NOTES
[address] [address]  Dept: [phone]  Dept Fax: [phone]    Martin Middleton is a 50 y.o. female who presents today for her medical conditions/complaintsas noted below. Martin Middleton is c/o of   Chief Complaint   Patient presents with    Follow-up         HPI:     Obesity  Lost 5 lbs   cliams compliant        Hemoglobin A1C (%)   Date Value   01/24/2020 5.5             ( goal A1Cis < 7)   Microalb/Crt.  Ratio (mcg/mg creat)   Date Value   01/24/2020 CANNOT BE CALCULATED     LDL Cholesterol (mg/dL)   Date Value   01/24/2020 93       (goal LDL is <100)   AST (U/L)   Date Value   01/24/2020 23     ALT (U/L)   Date Value   01/24/2020 31     BUN (mg/dL)   Date Value   01/24/2020 13     BP Readings from Last 3 Encounters:   07/27/20 130/72   06/24/20 128/74   05/27/20 138/88          (goal 120/80)    Past Medical History:   Diagnosis Date    Anxiety 5/3/2013    NO MED STOPPED [facility]OLOFT DID NOT LIKE SIDE EFFECTS TO FOLLOW WITH DR FOR ALTERNATE MED    Lumbar disc disease     Obesity (BMI 30-39.9) 1/22/2020    Rotator cuff tear 01/19/2013    TO HAVE SURGERY 07/2013, TEAR HAPPENED WHEN BENCH PRESSING WEIGHTS      Past Surgical History:   Procedure Laterality Date    LUMBAR DISC SURGERY      L5 herniated disc    SHOULDER ARTHROSCOPY Right 2/5/2020    SHOULDER ARTHROSCOPY ROTATOR CUFF REPAIR, W/INTERSCALENE BLOCK & EXPAREL performed by Ganesh Duffy MD at [address] [facility] ARTHROSCOPY Right 2/5/2020    SUBPECTORAL BICEP TENODESIS performed by Ganesh Duffy MD at [facility] OR       Family History   Problem Relation Age of Onset    High Blood Pressure Mother     High Cholesterol Father     Diabetes Sister 5        INSULIN DEPENDANT    Diabetes Brother 6        INSULIN DEPENDANT    Cancer Maternal Grandmother 72        OVARIAN    Diabetes Paternal Grandmother         [facility]       Social History     Tobacco Use    Smoking status: Never Smoker  Smokeless tobacco: Never Used   Substance Use Topics    Alcohol use: Yes     Comment: occassional      Current Outpatient Medications   Medication Sig Dispense Refill    phentermine (ADIPEX-P) 37.5 MG capsule Take 37.5 mg by mouth every morning.  phentermine (ADIPEX-P) 37.5 MG tablet Take 1 tablet by mouth every morning (before breakfast) for 30 days. 30 tablet 0     No current facility-administered medications for this visit. Allergies   Allergen Reactions    Bactrim [Sulfamethoxazole-Trimethoprim]      anaphalyaxis    Ibuprofen Itching    Other      Ocean perch  What ever they put on it to preserve it        Health Maintenance   Topic Date Due    HIV screen  06/10/1988    DTaP/Tdap/Td vaccine (1 - Tdap) 06/10/1992    Cervical cancer screen  06/10/1994    Flu vaccine (1) 09/01/2020    Diabetes screen  01/24/2023    Lipid screen  01/24/2025    Hepatitis A vaccine  Aged Out    Hepatitis B vaccine  Aged Out    Hib vaccine  Aged Out    Meningococcal (ACWY) vaccine  Aged Out    Pneumococcal 0-64 years Vaccine  Aged Out       Subjective:     Review of Systems   Constitutional: Negative for appetite change, diaphoresis and fatigue. HENT: Negative for ear discharge and trouble swallowing. Eyes: Negative for pain and discharge. Respiratory: Negative for cough, shortness of breath and wheezing. Cardiovascular: Negative for chest pain and palpitations. Gastrointestinal: Negative for abdominal distention, blood in stool and diarrhea. Endocrine: Negative for polydipsia and polyphagia. Genitourinary: Negative for difficulty urinating and frequency. Musculoskeletal: Positive for arthralgias. Negative for gait problem, myalgias and neck pain. Skin: Negative for color change and rash. Allergic/Immunologic: Negative for environmental allergies and food allergies. Neurological: Negative for dizziness and headaches. Hematological: Negative for adenopathy.  Does not bruise/bleed easily. Psychiatric/Behavioral: Negative for behavioral problems and sleep disturbance. Objective:     Physical Exam  Constitutional:       Appearance: She is well-developed. She is not diaphoretic. HENT:      Head: Normocephalic and atraumatic. Eyes:      General:         Right eye: No discharge. Left eye: No discharge. Extraocular Movements:      Right eye: Normal extraocular motion. Left eye: Normal extraocular motion. Conjunctiva/sclera: Conjunctivae normal.      Right eye: Right conjunctiva is not injected. Left eye: Left conjunctiva is not injected. Neck:      Musculoskeletal: Normal range of motion and neck supple. No edema or erythema. Thyroid: No thyroid mass or thyromegaly. Vascular: No JVD. Cardiovascular:      Rate and Rhythm: Normal rate and regular rhythm. Heart sounds: No murmur. No friction rub. Pulmonary:      Effort: Pulmonary effort is normal. No tachypnea, bradypnea, accessory muscle usage or respiratory distress. Breath sounds: Normal breath sounds. No wheezing or rales. Abdominal:      General: Bowel sounds are normal. There is no distension. Palpations: Abdomen is soft. Tenderness: There is no abdominal tenderness. There is no rebound. Musculoskeletal: Normal range of motion. General: No tenderness. Lymphadenopathy:      Head:      Right side of head: No submental or submandibular adenopathy. Left side of head: No submental or submandibular adenopathy. Cervical: No cervical adenopathy. Skin:     General: Skin is warm. Coloration: Skin is not pale. Findings: No bruising, ecchymosis or rash. Neurological:      Mental Status: She is alert and oriented to person, place, and time. Cranial Nerves: No cranial nerve deficit. Sensory: No sensory deficit. Motor: No atrophy or abnormal muscle tone.       Coordination: Coordination normal.   Psychiatric:         Mood and Affect: Mood is not anxious. Affect is not angry. Speech: Speech is not slurred. Behavior: Behavior normal. Behavior is not aggressive. Thought Content: Thought content does not include homicidal ideation. Cognition and Memory: Memory is not impaired. /72   Pulse 68   Temp 97.7 °F (36.5 °C)   Ht 5' 2\" (1.575 m)   Wt 213 lb (96.6 kg)   SpO2 97%   BMI 38.96 kg/m²     Assessment:       Diagnosis Orders   1. Acute shoulder pain, unspecified laterality     2. Morbidly obese (HCC)  phentermine (ADIPEX-P) 37.5 MG tablet    ALVARO DIGITAL SCREEN W OR WO CAD BILATERAL             Plan:      No follow-ups on file. Orders Placed This Encounter   Procedures    ALVARO DIGITAL SCREEN W OR WO CAD BILATERAL     Standing Status:   Future     Standing Expiration Date:   9/26/2021     Order Specific Question:   Reason for exam:     Answer:   brest screen     Orders Placed This Encounter   Medications    phentermine (ADIPEX-P) 37.5 MG tablet     Sig: Take 1 tablet by mouth every morning (before breakfast) for 30 days. Dispense:  30 tablet     Refill:  0    councelled to better diet and wt loss  Last for above   Done three months  Will try soemthing else next viist     Patient given educational materials - see patient instructions. Discussed use, benefit, and side effects of prescribed medications. All patientquestions answered. Pt voiced understanding. Reviewed health maintenance. Instructedto continue current medications, diet and exercise. Patient agreed with treatmentplan. Follow up as directed.      Electronically signed by Ariel Jimenez MD on 7/27/2020 at 3:57 PM

## 2020-07-27 NOTE — TELEPHONE ENCOUNTER
Patient still has 4 weeks left of physical therapy. She is asking if you can fax a return to work note with a return date 09/07/2020. She did not have fax # but works at [employer]. 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     Please call pt with any questions

## 2020-07-27 NOTE — PROGRESS NOTES
Visit Information    Have you changed or started any medications since your last visit including any over-the-counter medicines, vitamins, or herbal medicines? no   Are you having any side effects from any of your medications? -  no  Have you stopped taking any of your medications? Is so, why? -  no    Have you seen any other physician or provider since your last visit? No  Have you had any other diagnostic tests since your last visit? No  Have you been seen in the emergency room and/or had an admission to a hospital since we last saw you? No  Have you had your routine dental cleaning in the past 6 months? no    Have you activated your [employer] account? If not, what are your barriers?  Yes     Patient Care Team:  Bridget Pedraza MD as PCP - General (Internal Medicine)  Bridget Pedraza MD as PCP - [facility]    Medical History Review  Past Medical, Family, and Social History reviewed and does not contribute to the patient presenting condition    Health Maintenance   Topic Date Due    HIV screen  06/10/1988    DTaP/Tdap/Td vaccine (1 - Tdap) 06/10/1992    Cervical cancer screen  06/10/1994    Flu vaccine (1) 09/01/2020    Diabetes screen  01/24/2023    Lipid screen  01/24/2025    Hepatitis A vaccine  Aged Out    Hepatitis B vaccine  Aged Out    Hib vaccine  Aged Out    Meningococcal (ACWY) vaccine  Aged Out    Pneumococcal 0-64 years Vaccine  Aged Out

## 2020-07-27 NOTE — PROGRESS NOTES
Procedure: Right shoulder arthroscopic rotator cuff repair and biceps tenodesis  Date of procedure: 2/5/20    HPI: Bony Mccullough is a 50 y.o. female who is approximately 6 months status post the aforementioned procedure. She indicates that she continues to do well. She  has continued on with PT which is reportedly going well and she has noticed improvement in her motion. A few weeks ago she describes having a setback where she started working with certain weights overhead and this caused a flareup of pain in her shoulder. It took a few days but her pain has improved and her motion gotten better. Physical examination:  Evaluation of patient's right shoulder and upper extremity demonstrates her incisions to be healed appropriately. There is minimal to no swelling at this time. Sensation is grossly intact light touch in all dermatomes and she has a 2+ radial pulse. ROM: (Degrees)    Right   A P   Left   A P    Elevation  120 120   Elevation  150   Abduction  115 125   Abduction  160    ER   60 60   ER   85   IR   L4    IR   L1   90 abd/ER      90 abd/ER     90 abd/IR      90 abd/IR     Crepitation  No    Crepitation  No      Muscle strength:    Right       Left    Deltoid   5    Deltoid   5  Supraspinatus  5    Supraspinatus  5  ER   5    ER   5  IR   5    IR   5    Special tests    Right   Rotator Cuff    Left    n   Painful arc    n   n   Pain with ER    n    n   Neer's     y    n   Hawkin's    n    n   Drop Arm    n  n   Lift off/Belly Press   n  n   ER Lag    n      Impression and plan:Annalisa Milligan is a 50 y.o. female  who is approximately 6 months status post a right shoulder arthroscopic rotator cuff repair and biceps tenodesis. She is doing relatively well at this time but continues to struggle with her range of motion. She has demonstrated some improvement since our last visit but there is still room for more improvement.   I believe this can be achieved conservatively however if she fails to return to normal I will likely recommend proceeding with an arthroscopic capsular release and manipulation under anesthesia. 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 She has some concern about this. I will have her work in physical therapy to build towards being able to lift up at least 2 to 3 pounds overhead comfortably. We believe that she should be able to achieve this within the next month. Consequently the hope is that she can return to work in about a month. She is to continue working in physical therapy on her motion as well as gradual progressive strengthening. I will see her back for reevaluation in 6 months time.

## 2020-07-29 ENCOUNTER — HOSPITAL ENCOUNTER (OUTPATIENT)
Dept: PHYSICAL THERAPY | Facility: CLINIC | Age: 45
Setting detail: THERAPIES SERIES
Discharge: HOME | End: 2020-07-29
Payer: COMMERCIAL

## 2020-07-29 PROCEDURE — 97110 THERAPEUTIC EXERCISES: CPT

## 2020-07-29 NOTE — FLOWSHEET NOTE
right rotator cuff surgery, subscapular repair, bicep tenodesis  Exercises: bolded complete  Exercise Reps/ Time Weight/ Level Comments   UBE 2.5 min forward, 2.5 back backward L 2.0 Added 4/30, 5/7 increased resistance     Arm at 90 flexion, joint stabilization 20x   Added 4/30, pressure in lower arm, forearm      PROM to right shoulder 10 min   Supine, flexion, focus on abduction             weighted ball on shelf stimulation- ther activity  10x 3   A  1.1# white ball  to simulate IV pole added 7/8 Regressed 7/13.  Intermittent rest breaks at top of shelf to stretch    Wall walks, flexion and abduction 10x each   Monitoring ROM to prevent compensatory strategies, using bars on wall to slide up    5/12 able to reach top of bar without difficulty   ball circles on wall  20x each way red, 3.3 lbs  increased reps 6/18/20   T band         Scapular rows 20x blueberry Increased resistance 6/3/20 Progressed resistance 7/2   ext 20x blueberry Increased reps 6/3/20 Progressed resistance 7/2   ER 20x blueberry Added 5/5, increased reps 6/25/20 Progressed resistance 7/2   abduction 20x blueberry Increased reps 6/25/20 Progressed resistance 7/2               Standing flexion 20x 2# right, 3# left Added 5/7, 5/14 added wt   abduction 15x 1# 7/29 weight added   Bicep curls 20x 3# each Increased reps 6/18/20   scaption \"Y\" 20x 2# Added 5/7, 5/14 added wt Increased reps 7/13    rows 20x 3# each  added 5/12 Increased reps 7/13             Scapular retraction 15x5\"   Added 3/18-         Shoulder roll 15x   Added 3/18   Cervical rotation 10x each way 5\" hold     Seated thoracic extension 10x 5\" hold  Added 2/24/20   Standing towel stretch 15x   Added 4/16  Working towards midline         Prone         rows 15x 3# Increased wt 6/29 Increased wt 7/13   ext 15x 3# Increased wt 6/29 increased wt 7/13   Horizontal abd 20x 0 Increased reps 6/29, thumb down   Other:    REASSESS BY PRIMARY PT 7/15/2020   Standing- Active  Flexion 143°  Abduction 85°  IR thumb at L2  Flexion with in range 4/5  Abduction with in range -4/5     No need for pain medication the past few weeks      Treatment Charges: Mins Units   [x]  Modalities- gel ice 10 0   [x]  Ther Exercise 45 3   []  Manual Therapy     []  Ther Activities     []  Aquatics     []  Vasocompression     []  Other     Total Treatment time 45 3       Assessment: [x] Progressing toward goals. Pt continuing to do well with progressing strength. Abduction range still limited but less compensatory movements with arching back, more true motion in shoulder. [] No change. [] Other:       STG: (to be met in 8 treatments- first  Weeks post op)  1. ? Pain: Controlled post operative pain 5- improving, taking Advil before bed  2. ? ROM: PROM in flexion to 90 degrees, PROM of ER in scapular plane to 20 degrees 5- met   3. Protect repair,    4. Patient to be independent with home exercise program as demonstrated by performance with correct form without cues. , met 3-16-20  5. Ability to sleep 4 hours in her bed (verses currently on sofa) -5/12/2020 improved with Advil, met  6.    LTG: (to be met in 16 treatments) (6 weeks post op)  1. ER in plane of scapula to 45 degrees - 5/28/2020 met  2. ER at 60 abduction to 45 degrees met  3. PROM of shoulder flexion to 120 met  4. Continue to manage pain and inflammation post surgical. met  5. Ability to perform scapular retraction and demonstrate upright posture entering clinic to prepare for further strengthening per protocol. 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  Met per patient, taking Advil  2 x a week when she is more active    Additional goals for visits -

## 2020-08-03 ENCOUNTER — HOSPITAL ENCOUNTER (OUTPATIENT)
Dept: PHYSICAL THERAPY | Facility: CLINIC | Age: 45
Setting detail: THERAPIES SERIES
Discharge: HOME | End: 2020-08-03
Payer: COMMERCIAL

## 2020-08-03 PROCEDURE — 97110 THERAPEUTIC EXERCISES: CPT

## 2020-08-03 NOTE — FLOWSHEET NOTE
[] [facility]) - [facility] &  Therapy  [address]  [phone]  F: [phone] [x] [address]  [facility] 36   [address]  P: [phone]  F: [phone] [] [address] &  Therapy  [address] [facility]  P: [phone]  F: [phone] [] [address]  [facility]   [address]   [place]: [phone]  F: [phone]      Physical Therapy Daily Treatment Note    Date:  8/3/2020  Patient Name:  Babak Gaston    :  1975  MRN: [id number]  Physician: Dr. Dion Cantu: [facility]), unlimited visits  Medical Diagnosis: s/p right rotator cuff surgery       Rehab Codes: M25.511, M25.611, M79.621, M62.511  Onset Date: 2020 date of surgery                       Next 's appt:    2021  Visit# / total visits: 39/46    Cancels/No Shows: 2/0     2020 progress note sent to Dr. Rosa Purdy. Subjective:    Pain:  [x] Yes  [] No  Location: R shoulder  Pain Rating: (0-10 scale) 2/10  Pain altered Tx:  [x] No  [] Yes  Action:    Comments: Pt reporting aches only because she did too much over the weekend. Expected return to work in early September, continue PT 2x a week for the next month.                                                                                                                                                                                                                                                                                                                                                                                         Objective:   Modalities:  gel shoulder sleeve to right shoulder, 10 minutes, end of treatment  Precautions:hx tear to left side also - not surgically repaired  2020 date of medium tear right rotator cuff surgery, subscapular repair, bicep tenodesis  Exercises: bolded complete  Exercise Reps/ Time Weight/ Level Comments   UBE 2.5 min forward, 2.5 back backward L 2.0 Added 4/30, 5/7 increased resistance     Arm at 90 flexion, joint stabilization 20x   Added 4/30, pressure in lower arm, forearm      PROM to right shoulder 10 min   Supine, flexion, focus on abduction             weighted ball on shelf stimulation- ther activity  10x 3   A  2.2# white ball  to simulate IV pole added 7/8 Regressed 7/13.  Intermittent rest breaks at top of shelf to stretch   Increased weight 8/3    Wall walks, flexion and abduction 10x each   Monitoring ROM to prevent compensatory strategies, using bars on wall to slide up    5/12 able to reach top of bar without difficulty   ball circles on wall  20x each way red, 3.3 lbs  increased reps 6/18/20  Added body weight into ball this date    T band         Scapular rows 20x blueberry Increased resistance 6/3/20 Progressed resistance 7/2   ext 20x blueberry Increased reps 6/3/20 Progressed resistance 7/2   ER 20x blueberry Added 5/5, increased reps 6/25/20 Progressed resistance 7/2   abduction 20x blueberry Increased reps 6/25/20 Progressed resistance 7/2               Standing flexion 20x 2# right, 3# left Added 5/7, 5/14 added wt   abduction 15x 1# 7/29 weight added   Bicep curls 20x 3# each Increased reps 6/18/20   scaption \"Y\" 20x 2# Added 5/7, 5/14 added wt Increased reps 7/13    rows 20x 3# each  added 5/12 Increased reps 7/13             Scapular retraction 15x5\"   Added 3/18-         Shoulder roll 15x   Added 3/18   Cervical rotation 10x each way 5\" hold     Seated thoracic extension 10x 5\" hold  Added 2/24/20   Standing towel stretch 15x   Added 4/16  Working towards midline         Prone         rows 15x 3# Increased wt 6/29 Increased wt 7/13   ext 15x 3# Increased wt 6/29 increased wt 7/13   Horizontal abd 20x 0 Increased reps 6/29, thumb down   Suprisinatus  10x AAROM Added 8/3   Ys  10x FREDRICK Added 8/3   Other:    REASSESS BY PRIMARY PT 7/15/2020   Standing- Active  Flexion 143°  Abduction 85°  IR thumb at L2  Flexion with in range 4/5  Abduction with in range -4/5     No need for pain medication the past few weeks      Treatment Charges: Mins Units   [x]  Modalities- gel ice 10 0   [x]  Ther Exercise 45 3   []  Manual Therapy     []  Ther Activities     []  Aquatics     []  Vasocompression     []  Other     Total Treatment time 45 3       Assessment: [x] Progressing toward goals. Continued to progress strength this date with good tolerance. Cues to keep wrist in neutral for full strengthening benefit of ER/tband exercises. [] No change. [] Other:       STG: (to be met in 8 treatments- first  Weeks post op)  1. ? Pain: Controlled post operative pain 5- improving, taking Advil before bed  2. ? ROM: PROM in flexion to 90 degrees, PROM of ER in scapular plane to 20 degrees 5- met   3. Protect repair,    4. Patient to be independent with home exercise program as demonstrated by performance with correct form without cues. , met 3-16-20  5. Ability to sleep 4 hours in her bed (verses currently on sofa) -5/12/2020 improved with Advil, met  6.    LTG: (to be met in 16 treatments) (6 weeks post op)  1. ER in plane of scapula to 45 degrees - 5/28/2020 met  2. ER at 60 abduction to 45 degrees met  3. PROM of shoulder flexion to 120 met  4. Continue to manage pain and inflammation post surgical. met  5. Ability to perform scapular retraction and demonstrate upright posture entering clinic to prepare for further strengthening per protocol. 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 Education:  [] Yes  [x] No  [] Reviewed Prior HEP/Ed  Method of Education: [] Verbal  [] Demo  [] Written  3- pt to follow up with Dr. David Hopkins 3-26-20   3- issued and instructed on table flexion slides, ER with cane, scapular retraction, shoulder rolls, isometric into self - pt instructed to wait to start these until 3-18-20 6 week post op and after clearance to proceed from Dr. David Hopkins (Given exercise instruction ahead of schedule due to uncertainty with COVID -19 concerns and whether or not outpt PT will be disrupted)  6-8-200 issued blueberry band and HEP, rows, ER, abduction, extension  Patient also given our phone number to call with any questions. Comprehension of Education:  [] Verbalizes understanding. [] Demonstrates understanding. [] Needs review. [x] Demonstrates/verbalizes HEP/Ed previously given. Pendulums       Plan: [x] Continue for a total of 18 visits toward short and long term goals.   [x] Specific Instructions for subsequent treatments:      Time In: 2:05 pm   Time Out: 3:07 pm     Electronically signed by:  Luis F Santiago PTA

## 2020-08-06 ENCOUNTER — HOSPITAL ENCOUNTER (OUTPATIENT)
Dept: PHYSICAL THERAPY | Facility: CLINIC | Age: 45
Setting detail: THERAPIES SERIES
Discharge: HOME | End: 2020-08-06
Payer: COMMERCIAL

## 2020-08-06 PROCEDURE — 97110 THERAPEUTIC EXERCISES: CPT

## 2020-08-06 NOTE — FLOWSHEET NOTE
[] [facility]) - [facility] &  Therapy  [address]  [phone]  F: [phone] [x] [address]  [facility] 36   [address]  P: [phone]  F: [phone] [] [address] &  Therapy  [address]  P: [phone]  F: [phone] [] [address]  [facility]   [address]   [place]: [phone]  F: [phone]      Physical Therapy Daily Treatment Note    Date:  2020  Patient Name:  Shelton Shea    :  1975  MRN: [id number]  Physician: Dr. Marci Cranker [facility]), unlimited visits  Medical Diagnosis: s/p right rotator cuff surgery       Rehab Codes: M25.511, M25.611, M79.621, M62.511  Onset Date: 2020 date of surgery                       Next 's appt:    2021  Visit# / total visits: 40/46    Cancels/No Shows: 20     2020 progress note sent to Dr. Theodora Bliss. Subjective:    Pain:  [x] Yes  [] No  Location: R shoulder  Pain Rating: (0-10 scale) 2/10  Pain altered Tx:  [x] No  [] Yes  Action:    Comments: Pt reporting  Shoulder is \"good\"    Expected return to work in early September, continue PT 2x a week for the next month. States she continues to feel stronger all the time.                                                                                                                                                                                                                                                                                                                                                                                         Objective:   Modalities:  gel shoulder sleeve to right shoulder, 10 minutes, end of treatment, HELD 2020 per patient limited on time  Precautions:hx tear to left side also - not surgically repaired  2-5-2020 date of medium tear right rotator cuff surgery, subscapular repair, bicep tenodesis  Exercises: bolded complete  Exercise Reps/ Time Weight/ Level Comments   UBE 2.5 min forward, 2.5 back backward L 2.0 Added 4/30, 5/7 increased resistance     Arm at 90 flexion, joint stabilization 20x   Added 4/30, pressure in lower arm, forearm      PROM to right shoulder 10 min   Supine, flexion, focus on abduction             weighted ball on shelf stimulation- ther activity  10x 3   A  2.2# yellow  to simulate IV pole added 7/8 Regressed 7/13.  Intermittent rest breaks at top of shelf to stretch   Increased weight 8/3    Wall walks, flexion and abduction 10x each   Monitoring ROM to prevent compensatory strategies, using bars on wall to slide up    5/12 able to reach top of bar without difficulty   ball circles on wall  20x each way red, 3.3 lbs  increased reps 6/18/20  Added body weight into ball this date    T band         Scapular rows 20x blueberry Increased resistance 6/3/20 Progressed resistance 7/2   ext 20x blueberry Increased reps 6/3/20 Progressed resistance 7/2   ER 20x blueberry Added 5/5, increased reps 6/25/20 Progressed resistance 7/2   abduction 20x blueberry Increased reps 6/25/20 Progressed resistance 7/2               Standing flexion 20x 2# right, 3# left Added 5/7, 5/14 added wt   abduction 15x 1# 7/29 weight added   Bicep curls 20x 3# each Increased reps 6/18/20   scaption \"Y\" 20x 2# Added 5/7, 5/14 added wt Increased reps 7/13    rows 20x 3# each  added 5/12 Increased reps 7/13             Scapular retraction 15x5\"   Added 3/18-         Shoulder roll 15x   Added 3/18   Cervical rotation 10x each way 5\" hold     Seated thoracic extension 10x 5\" hold  Added 2/24/20   Standing towel stretch 15x   Added 4/16  Working towards midline         Prone         rows 15x 3# Increased wt 6/29 Increased wt 7/13   ext 15x 3# Increased wt 6/29 increased wt 7/13   Horizontal abd 20x 0 Increased reps 6/29, thumb down   Suprisinatus  10x AAROM Added 8/3   Ys  10x AAROM Added 8/3   Other:    REASSESS BY PRIMARY PT 7/15/2020   Standing- Active  Flexion 143°  Abduction 85°  IR thumb at L2  Flexion with in range 4/5  Abduction with in range -4/5     No need for pain medication the past few weeks      Treatment Charges: Mins Units   []  Modalities-      [x]  Ther Exercise 45 3   []  Manual Therapy     []  Ther Activities     []  Aquatics     []  Vasocompression     []  Other     Total Treatment time 45 3       Assessment: [x] Progressing toward goals. Guarded with PROM today, states it is \"sore\" end range but that it feels better after the stretching. [] No change. [] Other:       STG: (to be met in 8 treatments- first  Weeks post op)  1. ? Pain: Controlled post operative pain 5- improving, taking Advil before bed  2. ? ROM: PROM in flexion to 90 degrees, PROM of ER in scapular plane to 20 degrees 5- met   3. Protect repair,    4. Patient to be independent with home exercise program as demonstrated by performance with correct form without cues. , met 3-16-20  5. Ability to sleep 4 hours in her bed (verses currently on sofa) -5/12/2020 improved with Advil, met  6.    LTG: (to be met in 16 treatments) (6 weeks post op)  1. ER in plane of scapula to 45 degrees - 5/28/2020 met  2. ER at 60 abduction to 45 degrees met  3. PROM of shoulder flexion to 120 met  4. Continue to manage pain and inflammation post surgical. met  5. Ability to perform scapular retraction and demonstrate upright posture entering clinic to prepare for further strengthening per protocol. 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 Education:  [] Yes  [x] No  [] Reviewed Prior HEP/Ed  Method of Education: [] Verbal  [] Demo  [] Written  3- pt to follow up with Dr. Huynh 3-26-20   3- issued and instructed on table flexion slides, ER with cane, scapular retraction, shoulder rolls, isometric into self - pt instructed to wait to start these until 3-18-20 6 week post op and after clearance to proceed from Dr. Huynh (Given exercise instruction ahead of schedule due to uncertainty with COVID -19 concerns and whether or not outpt PT will be disrupted)  6-8-200 issued blueberry band and HEP, rows, ER, abduction, extension  Patient also given our phone number to call with any questions. Comprehension of Education:  [] Verbalizes understanding. [] Demonstrates understanding. [] Needs review. [x] Demonstrates/verbalizes HEP/Ed previously given. Pendulums       Plan: [x] Continue for a total of 18 visits toward short and long term goals.   [x] Specific Instructions for subsequent treatments:      Time In: 11:10 am   Time Out: 12:01 pm     Electronically signed by:  Rebecca Guerrero, PT

## 2020-08-10 ENCOUNTER — HOSPITAL ENCOUNTER (OUTPATIENT)
Dept: PHYSICAL THERAPY | Facility: CLINIC | Age: 45
Setting detail: THERAPIES SERIES
Discharge: HOME | End: 2020-08-10
Payer: COMMERCIAL

## 2020-08-10 PROCEDURE — 97110 THERAPEUTIC EXERCISES: CPT

## 2020-08-10 NOTE — FLOWSHEET NOTE
[] [facility]) - [facility] &  Therapy  [address]  [phone]  F: [phone] [x] [address]  [facility]   [address]  P: [phone]  F: [phone] [] [address] &  Therapy  [address] [facility]  P: [phone]  F: [phone] [] [address]  [facility]   [address]   [place]: [phone]  F: [phone]      Physical Therapy Daily Treatment Note    Date:  8/10/2020  Patient Name:  Monae Woods    :  1975  MRN: [id number]  Physician: Dr. Fidel Alvares [facility]), unlimited visits  Medical Diagnosis: s/p right rotator cuff surgery       Rehab Codes: M25.511, M25.611, M79.621, M62.511  Onset Date: 2020 date of surgery                       Next 's appt:    2021  Visit# / total visits: 41/46    Cancels/No Shows: 2020 progress note sent to Dr. Astrid Maldonado. Subjective:    Pain:  [x] Yes  [] No  Location: R shoulder  Pain Rating: (0-10 scale) 0-1/10  Pain altered Tx:  [x] No  [] Yes  Action:    Comments: Pt reports no shoulder pain, using 5# weights at home to strengthen. Was able to lift 3year old grandson this past weekend, more weight on left side, no increased pain, just a little sore. Expected return to work in early September, continue PT 2x a week for the next month. States she continues to feel stronger all the time.                                                                                                                                                                                                                                                                                                                                                                                         Objective:   Modalities: HELD wt 7/13   ext 15x 3# Increased wt 6/29 increased wt 7/13   Horizontal abd 20x 0 Increased reps 6/29, thumb down   Suprisinatus  10x AAROM Added 8/3   Ys  10x AAROM Added 8/3   Other:    REASSESS BY PRIMARY PT 8/10 /2020   Standing- Active  Flexion 146  Abduction 93°  IR thumb at T10  Flexion with in range 4/5  Abduction with in range 4/5     No need for pain medication the past few weeks      Treatment Charges: Mins Units   []  Modalities-      [x]  Ther Exercise 50 3   []  Manual Therapy     []  Ther Activities     []  Aquatics     []  Vasocompression     []  Other     Total Treatment time 50 3       Assessment: [x] Progressing toward goals. Guarded with PROM today, states it is \"sore\" end range but that it feels better after the stretching. [] No change. [] Other:       STG: (to be met in 8 treatments- first  Weeks post op)  1. ? Pain: Controlled post operative pain 5- improving, taking Advil before bed  2. ? ROM: PROM in flexion to 90 degrees, PROM of ER in scapular plane to 20 degrees 5- met   3. Protect repair,    4. Patient to be independent with home exercise program as demonstrated by performance with correct form without cues. , met 3-16-20  5. Ability to sleep 4 hours in her bed (verses currently on sofa) -5/12/2020 improved with Advil, met  6.    LTG: (to be met in 16 treatments) (6 weeks post op)  1. ER in plane of scapula to 45 degrees - 5/28/2020 met  2. ER at 60 abduction to 45 degrees met  3. PROM of shoulder flexion to 120 met  4. Continue to manage pain and inflammation post surgical. met  5. Ability to perform scapular retraction and demonstrate upright posture entering clinic to prepare for further strengthening per protocol.      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 Education:  [] Yes  [x] No  [] Reviewed Prior HEP/Ed  Method of Education: [] Verbal  [] Demo  [] Written  3- pt to follow up with Dr. Ayo King 3-26-20   3- issued and instructed on table flexion slides, ER with cane, scapular retraction, shoulder rolls, isometric into self - pt instructed to wait to start these until 3-18-20 6 week post op and after clearance to proceed from Dr. Ayo King (Given exercise instruction ahead of schedule due to uncertainty with COVID -19 concerns and whether or not outpt PT will be disrupted)  6-8-200 issued blueberry band and HEP, rows, ER, abduction, extension  Patient also given our phone number to call with any questions. Comprehension of Education:  [] Verbalizes understanding. [] Demonstrates understanding. [] Needs review. [x] Demonstrates/verbalizes HEP/Ed previously given. Pendulums       Plan: [x] Continue for a total of 18 visits toward short and long term goals.   [x] Specific Instructions for subsequent treatments:      Time In: 11:05 am   Time Out: 12:00     Electronically signed by:  Ashley Vasquez, PT

## 2020-08-13 ENCOUNTER — HOSPITAL ENCOUNTER (OUTPATIENT)
Dept: PHYSICAL THERAPY | Facility: CLINIC | Age: 45
Setting detail: THERAPIES SERIES
Discharge: HOME | End: 2020-08-13
Payer: COMMERCIAL

## 2020-08-13 PROCEDURE — 97110 THERAPEUTIC EXERCISES: CPT

## 2020-08-13 NOTE — FLOWSHEET NOTE
8/10 /2020   Standing- Active  Flexion 146  Abduction 93°  IR thumb at T10  Flexion with in range 4/5  Abduction with in range 4/5     No need for pain medication the past few weeks      Treatment Charges: Mins Units   []  Modalities-      [x]  Ther Exercise 40 3   []  Manual Therapy     []  Ther Activities     []  Aquatics     []  Vasocompression     []  Other     Total Treatment time 40 3       Assessment: [x] Progressing toward goals. Limited with progressions this date due to shortened treatment. [] No change. [] Other:       STG: (to be met in 8 treatments- first  Weeks post op)  1. ? Pain: Controlled post operative pain 5- improving, taking Advil before bed  2. ? ROM: PROM in flexion to 90 degrees, PROM of ER in scapular plane to 20 degrees 5- met   3. Protect repair,    4. Patient to be independent with home exercise program as demonstrated by performance with correct form without cues. , met 3-16-20  5. Ability to sleep 4 hours in her bed (verses currently on sofa) -5/12/2020 improved with Advil, met  6.    LTG: (to be met in 16 treatments) (6 weeks post op)  1. ER in plane of scapula to 45 degrees - 5/28/2020 met  2. ER at 60 abduction to 45 degrees met  3. PROM of shoulder flexion to 120 met  4. Continue to manage pain and inflammation post surgical. met  5. Ability to perform scapular retraction and demonstrate upright posture entering clinic to prepare for further strengthening per protocol. 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 Education:  [] Yes  [x] No  [] Reviewed Prior HEP/Ed  Method of Education: [] Verbal  [] Demo  [] Written  3- pt to follow up with Dr. Laura Luevano 3-26-20   3- issued and instructed on table flexion slides, ER with cane, scapular retraction, shoulder rolls, isometric into self - pt instructed to wait to start these until 3-18-20 6 week post op and after clearance to proceed from Dr. Laura Luevano (Given exercise instruction ahead of schedule due to uncertainty with COVID -19 concerns and whether or not outpt PT will be disrupted)  6-8-200 issued blueberry band and HEP, rows, ER, abduction, extension  Patient also given our phone number to call with any questions. Comprehension of Education:  [] Verbalizes understanding. [] Demonstrates understanding. [] Needs review. [x] Demonstrates/verbalizes HEP/Ed previously given. Pendulums       Plan: [x] Continue for a total of 18 visits toward short and long term goals.   [x] Specific Instructions for subsequent treatments:      Time In: 11:05 am   Time Out: 12:15 pm     Electronically signed by:  Terrie Lopes PTA

## 2020-08-17 ENCOUNTER — HOSPITAL ENCOUNTER (OUTPATIENT)
Dept: PHYSICAL THERAPY | Facility: CLINIC | Age: 45
Setting detail: THERAPIES SERIES
Discharge: HOME | End: 2020-08-17
Payer: COMMERCIAL

## 2020-08-17 PROCEDURE — 97110 THERAPEUTIC EXERCISES: CPT

## 2020-08-17 NOTE — FLOWSHEET NOTE
[] [facility]) - [facility] &  Therapy  [address]  [phone]  F: [phone] [x] [address]  [facility]   [address]  P: [phone]  F: [phone] [] [address] &  Therapy  [address] [facility]  P: [phone]  F: [phone] [] [address]  [facility]   [address]   [place]: [phone]  F: [phone]      Physical Therapy Daily Treatment Note    Date:  2020  Patient Name:  Alis Perdomo    :  1975  MRN: [id number]  Physician: Dr. Cloud Estonian [facility]), unlimited visits  Medical Diagnosis: s/p right rotator cuff surgery       Rehab Codes: M25.511, M25.611, M79.621, M62.511  Onset Date: 2020 date of surgery                       Next 's appt:    2021  Visit# / total visits: 43/46    Cancels/No Shows: 2/0     2020 progress note sent to Dr. Huynh. Subjective:    Pain:  [x] Yes  [] No  Location: R shoulder  Pain Rating: (0-10 scale) 0-1/10  Pain altered Tx:  [x] No  [] Yes  Action:    Comments: Patient reports neck has been stiff the past 3 days \"slept wrong\"  Shoulder \"doing really good. \"                                                                                                                                                                                                                                                                                                                                                                            Objective:   Modalities:  gel shoulder sleeve to right shoulder, 10 minutes, end of treatmentresumed  due to increasing wts with some exercises  Precautions:hx tear to left side also - not surgically repaired  2020 date of medium tear right rotator cuff surgery, subscapular repair, bicep tenodesis      Exercises: bolded complete - may try some floor to waist lifting next visit  Exercise Reps/ Time Weight/ Level Comments   UBE 2.5 min forward, 2.5 back backward L 3.0 Added 4/30, 8/17 increased resistance     Arm at 90 flexion, joint stabilization 20x   Added 4/30, pressure in lower arm, forearm      PROM to right shoulder 10 min   Supine, flexion, focus on abduction             weighted ball on shelf stimulation- ther activity  10x 2   A  23.3# red  to simulate IV pole added 7/8 Regressed 7/13.  Intermittent rest breaks at top of shelf to stretch   Increased weight 8/16 to red   Wall walks, flexion and abduction 10x each   Monitoring ROM to prevent compensatory strategies, using bars on wall to slide up    5/12 able to reach top of bar without difficulty   ball circles on wall  20x each way red, 3.3 lbs  increased reps 6/18/20  Added body weight into ball this date    T band         Scapular rows 20x blueberry Increased resistance 6/3/20 Progressed resistance 7/2   ext 20x blueberry Increased reps 6/3/20 Progressed resistance 7/2   ER 20x blueberry Added 5/5, increased reps 6/25/20 Progressed resistance 7/2   abduction 20x blueberry Increased reps 6/25/20 Progressed resistance 7/2               Standing flexion 20x 3# right, 3# left Added 5/7, 5/14 added wt  Increased weight 8/13   abduction 15x 3# 8/16 weight increased   Bicep curls 20x 3# each Increased reps 6/18/20   scaption \"Y\" 20x 3# Added 5/7, 5/14 added wt Increased  8/16    rows 20x 3# each  added 5/12 Increased reps 7/13             Scapular retraction 15x5\"   Added 3/18-         Shoulder roll 15x   Added 3/18   Cervical rotation 10x each way 5\" hold     Seated thoracic extension 10x 5\" hold  Added 2/24/20   Standing towel stretch 15x   Added 4/16  Working towards midline         Prone         rows 15x 3# Increased wt 6/29 Increased wt 7/13   ext 15x 3# Increased wt 6/29 increased wt 7/13   Horizontal abd 20x 0 Increased reps 6/29, thumb down   Suprisinatus  10x AAROM Added 8/3   Ys  10x AAROM Added 8/3   Other:    REASSESS BY PRIMARY PT 8/10 /2020   Standing- Active  Flexion 146  Abduction 93°  IR thumb at T10  Flexion with in range 4/5  Abduction with in range 4/5     No need for pain medication the past few weeks      Treatment Charges: Mins Units   []  Modalities- gel ice 10 0   [x]  Ther Exercise 45 3   []  Manual Therapy     []  Ther Activities     []  Aquatics     []  Vasocompression     []  Other     Total Treatment time 45 3       Assessment: [x] Progressing toward goals. 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 Palpable soreness in biceps region proximally with manual stretching. [] No change. [] Other:       STG: (to be met in 8 treatments- first  Weeks post op)  1. ? Pain: Controlled post operative pain 5- improving, taking Advil before bed  2. ? ROM: PROM in flexion to 90 degrees, PROM of ER in scapular plane to 20 degrees 5- met   3. Protect repair,    4. Patient to be independent with home exercise program as demonstrated by performance with correct form without cues. , met 3-16-20  5. Ability to sleep 4 hours in her bed (verses currently on sofa) -5/12/2020 improved with Advil, met  6.    LTG: (to be met in 16 treatments) (6 weeks post op)  1. ER in plane of scapula to 45 degrees - 5/28/2020 met  2. ER at 60 abduction to 45 degrees met  3. PROM of shoulder flexion to 120 met  4. Continue to manage pain and inflammation post surgical. met  5. Ability to perform scapular retraction and demonstrate upright posture entering clinic to prepare for further strengthening per protocol.      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 Met per patient, taking Advil  2 x a week when she is more active    Additional goals for visits - 55  Lifting 3#, 20x overhead for work simulation tasks for RTW, initiated 8-  Ability to lift 25#, 10x floor to waist                 Patient goals:  Less pain, more mobility, ultimately return to work as a nurse    Pt. Education:  [] Yes  [x] No  [] Reviewed Prior HEP/Ed  Method of Education: [] Verbal  [] Demo  [] Written  3- pt to follow up with Dr. Thony Alcantar 3-26-20   3- issued and instructed on table flexion slides, ER with cane, scapular retraction, shoulder rolls, isometric into self - pt instructed to wait to start these until 3-18-20 6 week post op and after clearance to proceed from Dr. Thony Alcantar (Given exercise instruction ahead of schedule due to uncertainty with COVID -19 concerns and whether or not outpt PT will be disrupted)  6-8-200 issued blueberry band and HEP, rows, ER, abduction, extension  Patient also given our phone number to call with any questions. Comprehension of Education:  [] Verbalizes understanding. [] Demonstrates understanding. [] Needs review. [x] Demonstrates/verbalizes HEP/Ed previously given. Pendulums       Plan: [x] Continue for a total of 18 visits toward short and long term goals.   [x] Specific Instructions for subsequent treatments:      Time In: 11:10 am   Time Out: 12: 15 pm     Electronically signed by:  Claudell Quan, PT

## 2020-08-20 ENCOUNTER — HOSPITAL ENCOUNTER (OUTPATIENT)
Dept: PHYSICAL THERAPY | Facility: CLINIC | Age: 45
Setting detail: THERAPIES SERIES
Discharge: HOME | End: 2020-08-20
Payer: COMMERCIAL

## 2020-08-20 PROCEDURE — 97110 THERAPEUTIC EXERCISES: CPT

## 2020-08-20 NOTE — FLOWSHEET NOTE
[] [facility]) - [facility] &  Therapy  [address]  [phone]  F: [phone] [x] [address]  [facility] 36   [address]  P: [phone]  F: [phone] [] [address] &  Therapy  [address] [facility]  P: [phone]  F: [phone] [] [address]  [facility]   [address]   [place]: [phone]  F: [phone]      Physical Therapy Daily Treatment Note    Date:  2020  Patient Name:  Miguel Ángel Tian    :  1975  MRN: [id number]  Physician: Dr. Del Toro Mess: [facility]), unlimited visits  Medical Diagnosis: s/p right rotator cuff surgery       Rehab Codes: M25.511, M25.611, M79.621, M62.511  Onset Date: 2020 date of surgery                       Next 's appt:    2021  Visit# / total visits: 44/46    Cancels/No Shows: 2020 progress note sent to Dr. Delfina Méndez. Subjective:    Pain:  [x] Yes  [] No  Location: R shoulder  Pain Rating: (0-10 scale) 0-1/10  Pain altered Tx:  [x] No  [] Yes  Action:    Comments: Patient reports some chest tightness from picking up her grandson.                                                                                                                                                                                                                                                                                                                                                                            Objective:   Modalities:  gel shoulder sleeve to right shoulder, 10 minutes, end of treatmentresumed  due to increasing wts with some exercises  Precautions:hx tear to left side also - not surgically repaired  2020 date of medium tear right rotator cuff surgery, subscapular repair, bicep tenodesis      Exercises: bolded complete -  Exercise Reps/ Time Weight/ Level Comments   UBE 2.5 min forward, 2.5 back backward L 3.0 Added 4/30, 8/17 increased resistance     Arm at 90 flexion, joint stabilization 20x   Added 4/30, pressure in lower arm, forearm      PROM to right shoulder 10 min   Supine, flexion, focus on abduction             weighted ball on shelf stimulation- ther activity  10x 2   A  3.3# red  to simulate IV pole added 7/8 Regressed 7/13.  Intermittent rest breaks at top of shelf to stretch   Increased weight 8/16 to red   Wall walks, flexion and abduction 10x each   Monitoring ROM to prevent compensatory strategies, using bars on wall to slide up    5/12 able to reach top of bar without difficulty   ball circles on wall  25x each way red, 3.3 lbs  increased reps 6/18/20  Added body weight into ball this date      T band         Scapular rows 20x blueberry Increased resistance 6/3/20 Progressed resistance 7/2   ext 20x blueberry Increased reps 6/3/20 Progressed resistance 7/2   ER 20x blueberry Added 5/5, increased reps 6/25/20 Progressed resistance 7/2   abduction 20x blueberry Increased reps 6/25/20 Progressed resistance 7/2   Rotational pulling to simulate patient trasnfer 10x2 ea silver Added 8/20   scaption with LE lift 20x blue To simulate transfer and assist UEs               Standing flexion 20x 3# right, 3# left Added 5/7, 5/14 added wt  Increased weight 8/13   abduction 15x 3# 8/16 weight increased   Bicep curls 20x 3# each Increased reps 6/18/20   scaption \"Y\" 20x 3# Added 5/7, 5/14 added wt Increased  8/16    rows 20x 3# each  added 5/12 Increased reps 7/13             Scapular retraction 15x5\"   Added 3/18-         Shoulder roll 15x   Added 3/18   Cervical rotation 10x each way 5\" hold     Seated thoracic extension 10x 5\" hold  Added 2/24/20   Standing towel stretch 15x   Added 4/16  Working towards midline         Prone         rows 15x 4# Increased wt 6/29 Increased wt 8/20 ext 15x 4# Increased wt 6/29 increased wt 8/20   Horizontal abd 20x 2# Increased reps 6/29, thumb down  Added weight 8/20   Suprisinatus  10x AAROM Added 8/3   Ys  10x AAROM Added 8/3   Other:    REASSESS BY PRIMARY PT 8/10 /2020   Standing- Active  Flexion 146  Abduction 93°  IR thumb at T10  Flexion with in range 4/5  Abduction with in range 4/5     No need for pain medication the past few weeks      Treatment Charges: Mins Units   [x]  Modalities- gel ice 10 0   [x]  Ther Exercise 40 3   []  Manual Therapy     []  Ther Activities     []  Aquatics     []  Vasocompression     []  Other     Total Treatment time 40 3       Assessment: [x] Progressing toward goals. Added tband exercises to exercises to simulate transferring patient from a sitting position and also a supine position. [] No change. [] Other:       STG: (to be met in 8 treatments- first  Weeks post op)  1. ? Pain: Controlled post operative pain 5- improving, taking Advil before bed  2. ? ROM: PROM in flexion to 90 degrees, PROM of ER in scapular plane to 20 degrees 5- met   3. Protect repair,    4. Patient to be independent with home exercise program as demonstrated by performance with correct form without cues. , met 3-16-20  5. Ability to sleep 4 hours in her bed (verses currently on sofa) -5/12/2020 improved with Advil, met  6.    LTG: (to be met in 16 treatments) (6 weeks post op)  1. ER in plane of scapula to 45 degrees - 5/28/2020 met  2. ER at 60 abduction to 45 degrees met  3. PROM of shoulder flexion to 120 met  4. Continue to manage pain and inflammation post surgical. met  5. Ability to perform scapular retraction and demonstrate upright posture entering clinic to prepare for further strengthening per protocol.      Additional goals for visits 17-30        Continue to meet above unmet goals        Progress with light strengthening to try to improve AROM with functional tasks, dressing, reaching, to Prepare for RTW as

## 2020-08-24 ENCOUNTER — HOSPITAL ENCOUNTER (OUTPATIENT)
Dept: PHYSICAL THERAPY | Facility: CLINIC | Age: 45
Setting detail: THERAPIES SERIES
Discharge: HOME | End: 2020-08-24
Payer: COMMERCIAL

## 2020-08-24 PROCEDURE — 97110 THERAPEUTIC EXERCISES: CPT

## 2020-08-27 ENCOUNTER — HOSPITAL ENCOUNTER (OUTPATIENT)
Dept: PHYSICAL THERAPY | Facility: CLINIC | Age: 45
Setting detail: THERAPIES SERIES
Discharge: HOME | End: 2020-08-27
Payer: COMMERCIAL

## 2020-08-27 PROCEDURE — 97110 THERAPEUTIC EXERCISES: CPT

## 2020-08-27 NOTE — FLOWSHEET NOTE
[] [facility]) [facility] &  Therapy  [address]  [phone]  F: [phone] [x] [email or web address]  Located within [facility] 36   [address]  P: [phone]  F: [phone] [] [address] &  Therapy  [address] [facility]  P: [phone]  F: [phone] [] [address]  [facility]   [address]   [place]: [phone]  F: [phone]      Physical Therapy Daily Treatment Note    Date:  2020  Patient Name:  Kevin Spicer    :  1975  MRN: [id number]  Physician: Dr. Lori Roche [facility]), unlimited visits  Medical Diagnosis: s/p right rotator cuff surgery       Rehab Codes: M25.511, M25.611, M79.621, M62.511  Onset Date: 2020 date of surgery                       Next 's appt:    2021  Visit# / total visits: 46/46    Cancels/No Shows: 2/0     2020 progress note sent to Dr. Sadaf Arroyo. Subjective:    Pain:  [x] Yes  [] No  Location: R shoulder  Pain Rating: (0-10 scale) 0-1/10  Pain altered Tx:  [x] No  [] Yes  Action:    Comments: Patient reports little to no pain, feels ready to return to work, doing well with home tasks.                                       Objective:   Modalities:  gel shoulder sleeve to right shoulder, 10 minutes, end of treatment  Precautions:hx tear to left side also - not surgically repaired  2020 date of medium tear right rotator cuff surgery, subscapular repair, bicep tenodesis      Exercises: bolded complete -  Exercise Reps/ Time Weight/ Level Comments   UBE 2.5 min forward, 2.5 back backward L 3.0 Added ,  increased resistance  2020 on [employer] arm bike , all forward, 8 bars     Arm at 90 flexion, joint stabilization 20x   Added , pressure in lower arm, forearm      PROM to right shoulder 10 min   Supine, flexion, focus on abduction             weighted ball on shelf stimulation- ther activity  10x 3   A  3.3# red  to simulate IV pole added 7/8 Regressed 7/13.  Intermittent rest breaks at top of shelf to stretch   Increased weight 8/16 to red   Wall walks, flexion and abduction 10x each   Monitoring ROM to prevent compensatory strategies, using bars on wall to slide up    5/12 able to reach top of bar without difficulty   ball circles on wall  25x each way red, 3.3 lbs  increased reps 6/18/20  Added body weight into ball this date      T band         Scapular rows 20x blueberry Increased resistance 6/3/20 Progressed resistance 7/2   ext 20x blueberry Increased reps 6/3/20 Progressed resistance 7/2   ER     20x blueberry Added 5/5, increased reps 6/25/20 Progressed resistance 7/2   abduction 20x blueberry Increased reps 6/25/20 Progressed resistance 7/2   Rotational pulling to simulate patient transfer 10x2 ea silver Added 8/20   scaption with LE lift 20x blue To simulate transfer and assist UEs               Standing flexion 20x 3# right, 3# left Added 5/7, 5/14 added wt  Increased weight 8/13   abduction 15x 3# 8/16 weight increased   Bicep curls 20x 3# each Increased reps 6/18/20   scaption \"Y\" 20x 3# Added 5/7, 5/14 added wt Increased  8/16    rows 20x 3# each  added 5/12 Increased reps 7/13             Scapular retraction 15x5\"   Added 3/18-         Shoulder roll 15x   Added 3/18   Cervical rotation 10x each way 5\" hold     Seated thoracic extension 10x 5\" hold  Added 2/24/20   Standing towel stretch 15x   Added 4/16  Working towards midline         Prone         rows 15x 4# Increased wt 6/29 Increased wt 8/20   ext 15x 4# Increased wt 6/29 increased wt 8/20   Horizontal abd 20x 2# Increased reps 6/29, thumb down  Added weight 8/20   Suprisinatus  10x AAROM Added 8/3   Ys  10x AAROM Added 8/3   Other:    REASSESS BY PRIMARY PT 8/27/2020  Standing- Active   8-  Flexion     146  Abduction     106  IR thumb at T10   WFL  Flexion     +4/5  Abduction in available range +4/5 mild soreness with testing    8/27/2020 able to get bra on and off  10% impairment with ADL's on UEFI      No need for pain medications, icing as needed, biofreeze if sore or achy. Treatment Charges: Mins Units   [x]  Modalities- gel ice 10 0   [x]  Ther Exercise 45 3   []  Manual Therapy     []  Ther Activities     []  Aquatics     []  Vasocompression     []  Other     Total Treatment time 45 3       Assessment: [x] Progressing toward goals. Able to complete exercises as charted, expect discharge after next visit. [] No change. [] Other:       STG: (to be met in 8 treatments- first  Weeks post op)  1. ? Pain: Controlled post operative pain 5- improving, taking Advil before bed  2. ? ROM: PROM in flexion to 90 degrees, PROM of ER in scapular plane to 20 degrees 5- met   3. Protect repair,    4. Patient to be independent with home exercise program as demonstrated by performance with correct form without cues. , met 3-16-20  5. Ability to sleep 4 hours in her bed (verses currently on sofa) -5/12/2020 improved with Advil, met  6.    LTG: (to be met in 16 treatments) (6 weeks post op)  1. ER in plane of scapula to 45 degrees - 5/28/2020 met  2. ER at 60 abduction to 45 degrees met  3. PROM of shoulder flexion to 120 met  4. Continue to manage pain and inflammation post surgical. met  5. Ability to perform scapular retraction and demonstrate upright posture entering clinic to prepare for further strengthening per protocol. met    Additional goals for visits 17-30        Continue to meet above unmet goals        Progress with light strengthening to try to improve AROM with functional tasks, dressing, reaching, to Prepare for RTW as hospital RN. 9-7-2020 expected RTW, met goal        Improve sleep by 50%, able to sleep 4 hours without increased pain.  Met per patient, taking Advil  2 x a week when she is more active    Additional goals for visits - 55  Lifting 3#, 20x overhead for work simulation tasks for RTW, met 8-  Ability to lift 25#, 10x floor to waist , able to lift 20# grandson and hold now without difficulty. Some limitation if lifts groceries up to shoulder height. 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 Education:  [] Yes  [] No  [x] Reviewed Prior HEP/Ed  Method of Education: [x] Verbal  [] Demo  [] Written  3- pt to follow up with Dr. Janneth Blevins 3-26-20   3- issued and instructed on table flexion slides, ER with cane, scapular retraction, shoulder rolls, isometric into self - pt instructed to wait to start these until 3-18-20 6 week post op and after clearance to proceed from Dr. Janneth Blevins (Given exercise instruction ahead of schedule due to uncertainty with COVID -19 concerns and whether or not outpt PT will be disrupted)  6-8-200 issued blueberry band and HEP, rows, ER, abduction, extension  Patient also given our phone number to call with any questions. Comprehension of Education:  [] Verbalizes understanding. [] Demonstrates understanding. [] Needs review. [x] Demonstrates/verbalizes HEP/Ed previously given. Pendulums   8- issued grey T band to progress with HEP      Plan: [] Continue for a total of 18 visits toward short and long term goals.   [] Specific Instructions for subsequent treatments:  DISCHARGE TO HEP        Time In:11:00 am   Time Out: 12:05 pm     Electronically signed by:  Ted Aguirre, PT

## 2020-08-27 NOTE — DISCHARGE SUMMARY
[] [facility]) [facility] &  Therapy  [address]  [phone]  F: [phone] [x] [address]  [facility]   [address]  P: [phone]  F: [phone] [] [place]  [address]  P: [phone]  F: [phone] [] [address]  [facility]   [address]   [place]: [phone]  F: [phone]      Physical Therapy Discharge Note    Date: 2020      Patient: Phoebe Burkett  : 1975  MRN: [id number]    Physician:  [address]: [address] [facility]), unlimited visits  Medical Diagnosis: s/p right rotator cuff surgery       Rehab Codes: M25.511, Y76.843, M79.621, M62.511  Onset Date: 2020 date of surgery                       Next 's appt:    2021  Visit# / total visits: 46/46                    Cancels/No Shows:    Date of initial visit: 2020                Date of final visit: 2020      Subjective:  Pain:  [x]? Yes  []? No               Location: R shoulder               Pain Rating: (0-10 scale) 0-1/10  Pain altered Tx:  [x]? No  []? Yes  Action:     Comments: Patient reports little to no pain, feels ready to return to work, doing well with home tasks.        Objective:  REASSESS BY PRIMARY PT 2020  Standing- Active                              2020  Flexion                                              146  Abduction                                         106  IR thumb at T10                                WFL  Flexion                                              +4/5  Abduction in available range         +4/5 mild soreness with testing        2020 able to get bra on and off  10% impairment with ADL's on UEFI       No need for pain medications, icing as needed, biofreeze if sore or Edgardo Oh:      Treatment to Date:  [x] Therapeutic Exercise    [x] Modalities:  [x] Therapeutic Activity    [] Ultrasound  [] Electrical Stimulation  [] Gait Training     [] Massage       [] Lumbar/Cervical Traction  [] Neuromuscular Re-education [x] Cold/hotpack [] Iontophoresis: 4 mg/mL  [x] Instruction in Home Exercise Program                     Dexamethasone Sodium  [x] Manual Therapy             Phosphate 40-80 mAmin  [] Aquatic Therapy                   [x] Vasocompression/    [] Other:             Game Ready    Discharge Status:     [x] Pt recovered from conditions. Treatment goals were met. [] Pt received maximum benefit. No further therapy indicated at this time. [x] Pt to continue exercise/home instructions independently. Electronically signed by Claudell Quan, PT on 8/27/2020 at 2:04 PM      If you have any questions or concerns, please don't hesitate to call.   Thank you for your referral.

## 2020-09-15 ENCOUNTER — OFFICE VISIT (OUTPATIENT)
Dept: INTERNAL MEDICINE CLINIC | Age: 45
End: 2020-09-15
Payer: COMMERCIAL

## 2020-09-15 VITALS
HEIGHT: 62 IN | WEIGHT: 211 LBS | SYSTOLIC BLOOD PRESSURE: 136 MMHG | TEMPERATURE: 97.6 F | BODY MASS INDEX: 38.83 KG/M2 | HEART RATE: 69 BPM | OXYGEN SATURATION: 98 % | DIASTOLIC BLOOD PRESSURE: 82 MMHG

## 2020-09-15 PROCEDURE — 99214 OFFICE O/P EST MOD 30 MIN: CPT | Performed by: INTERNAL MEDICINE

## 2020-09-15 RX ORDER — PHENTERMINE HYDROCHLORIDE 37.5 MG/1
37.5 TABLET ORAL
Qty: 30 TABLET | Refills: 0 | Status: SHIPPED | OUTPATIENT
Start: 2020-09-15 | End: 2020-10-15

## 2020-09-15 ASSESSMENT — ENCOUNTER SYMPTOMS
EYE PAIN: 0
DIARRHEA: 0
ABDOMINAL DISTENTION: 0
TROUBLE SWALLOWING: 0
WHEEZING: 0
EYE DISCHARGE: 0
BLOOD IN STOOL: 0
COUGH: 0
COLOR CHANGE: 0
SHORTNESS OF BREATH: 0

## 2020-09-15 NOTE — PROGRESS NOTES
[address] [id number]  Dept: [phone]  Dept Fax: [phone]    Adonis Muller is a 50 y.o. female who presents today for her medical conditions/complaintsas noted below. Adonis Muller is c/o of   Chief Complaint   Patient presents with    Shoulder Pain         HPI:     Obesity wt loss  Not much serious for wt loss        Hemoglobin A1C (%)   Date Value   01/24/2020 5.5             ( goal A1Cis < 7)   Microalb/Crt.  Ratio (mcg/mg creat)   Date Value   01/24/2020 CANNOT BE CALCULATED     LDL Cholesterol (mg/dL)   Date Value   01/24/2020 93       (goal LDL is <100)   AST (U/L)   Date Value   01/24/2020 23     ALT (U/L)   Date Value   01/24/2020 31     BUN (mg/dL)   Date Value   01/24/2020 13     BP Readings from Last 3 Encounters:   09/15/20 136/82   07/27/20 130/72   06/24/20 128/74          (goal 120/80)    Past Medical History:   Diagnosis Date    Anxiety 5/3/2013    NO MED STOPPED ZOLOFT DID NOT LIKE SIDE EFFECTS TO FOLLOW WITH DR FOR ALTERNATE MED    Lumbar disc disease     Obesity (BMI 30-39.9) 1/22/2020    Rotator cuff tear 01/19/2013    TO HAVE SURGERY 07/2013, TEAR HAPPENED WHEN BENCH PRESSING WEIGHTS      Past Surgical History:   Procedure Laterality Date    LUMBAR DISC SURGERY      L5 herniated disc    SHOULDER ARTHROSCOPY Right 2/5/2020    SHOULDER ARTHROSCOPY ROTATOR CUFF REPAIR, W/INTERSCALENE BLOCK & EXPAREL performed by Alejandro Neff MD at [facility] ARTHROSCOPY Right 2/5/2020    SUBPECTORAL BICEP TENODESIS performed by Alejandro Neff MD at [facility] OR       Family History   Problem Relation Age of Onset    High Blood Pressure Mother     High Cholesterol Father     Diabetes Sister 5        INSULIN DEPENDANT    Diabetes Brother 6        INSULIN DEPENDANT    Cancer Maternal Grandmother 72        OVARIAN    Diabetes Paternal Grandmother         [facility]       Social History     Tobacco Use    Smoking status: Never Smoker    Smokeless tobacco: Never Used   Substance Use Topics    Alcohol use: Yes     Comment: occassional      Current Outpatient Medications   Medication Sig Dispense Refill    phentermine (ADIPEX-P) 37.5 MG tablet Take 1 tablet by mouth every morning (before breakfast) for 30 days. 30 tablet 0    phentermine (ADIPEX-P) 37.5 MG capsule Take 37.5 mg by mouth every morning. No current facility-administered medications for this visit. Allergies   Allergen Reactions    Bactrim [Sulfamethoxazole-Trimethoprim]      anaphalyaxis    Ibuprofen Itching    Other      Ocean perch  What ever they put on it to preserve it        Health Maintenance   Topic Date Due    HIV screen  06/10/1988    DTaP/Tdap/Td vaccine (1 - Tdap) 06/10/1992    Cervical cancer screen  06/10/1994    Flu vaccine (1) 09/01/2020    Diabetes screen  01/24/2023    Lipid screen  01/24/2025    Hepatitis A vaccine  Aged Out    Hepatitis B vaccine  Aged Out    Hib vaccine  Aged Out    Meningococcal (ACWY) vaccine  Aged Out    Pneumococcal 0-64 years Vaccine  Aged Out       Subjective:     Review of Systems   Constitutional: Negative for appetite change, diaphoresis and fatigue. HENT: Negative for ear discharge and trouble swallowing. Eyes: Negative for pain and discharge. Respiratory: Negative for cough, shortness of breath and wheezing. Cardiovascular: Negative for chest pain and palpitations. Gastrointestinal: Negative for abdominal distention, blood in stool and diarrhea. Endocrine: Negative for polydipsia and polyphagia. Genitourinary: Negative for difficulty urinating and frequency. Musculoskeletal: Positive for arthralgias. Negative for gait problem, myalgias and neck pain. Skin: Negative for color change and rash. Allergic/Immunologic: Negative for environmental allergies and food allergies. Neurological: Negative for dizziness and headaches. Hematological: Negative for adenopathy.  Does not and Affect: Mood is not anxious. Affect is not angry. Speech: Speech is not slurred. Behavior: Behavior normal. Behavior is not aggressive. Thought Content: Thought content does not include homicidal ideation. Cognition and Memory: Memory is not impaired. /82   Pulse 69   Temp 97.6 °F (36.4 °C)   Ht 5' 2\" (1.575 m)   Wt 211 lb (95.7 kg)   SpO2 98%   BMI 38.59 kg/m²     Assessment:       Diagnosis Orders   1. Anxiety     2. Morbidly obese (HCC)  phentermine (ADIPEX-P) 37.5 MG tablet   3. Benign paroxysmal positional vertigo, unspecified laterality     4. Left shoulder pain, unspecified chronicity               Plan:      No follow-ups on file. No orders of the defined types were placed in this encounter. Orders Placed This Encounter   Medications    phentermine (ADIPEX-P) 37.5 MG tablet     Sig: Take 1 tablet by mouth every morning (before breakfast) for 30 days. Dispense:  30 tablet     Refill:  0    councelled for wt loss  Main concern nto serious  Not takign diet seriously and not working out  Will try one more month if not significant wt loss  Will stop  Will try something     Patient given educational materials - see patient instructions. Discussed use, benefit, and side effects of prescribed medications. All patientquestions answered. Pt voiced understanding. Reviewed health maintenance. Instructedto continue current medications, diet and exercise. Patient agreed with treatmentplan. Follow up as directed.      Electronically signed by Catracho Barth MD on 9/15/2020 at 11:40 AM

## 2020-09-15 NOTE — PROGRESS NOTES
Visit Information    Have you changed or started any medications since your last visit including any over-the-counter medicines, vitamins, or herbal medicines? no   Are you having any side effects from any of your medications? -  no  Have you stopped taking any of your medications? Is so, why? -  no    Have you seen any other physician or provider since your last visit? No  Have you had any other diagnostic tests since your last visit? No  Have you been seen in the emergency room and/or had an admission to a hospital since we last saw you? No  Have you had your routine dental cleaning in the past 6 months? no    Have you activated your [employer] account? If not, what are your barriers?  Yes     Patient Care Team:  Dominick Suazo MD as PCP - General (Internal Medicine)  Dominick Suazo MD as PCP - [facility]    Medical History Review  Past Medical, Family, and Social History reviewed and does not contribute to the patient presenting condition    Health Maintenance   Topic Date Due    HIV screen  06/10/1988    DTaP/Tdap/Td vaccine (1 - Tdap) 06/10/1992    Cervical cancer screen  06/10/1994    Flu vaccine (1) 09/01/2020    Diabetes screen  01/24/2023    Lipid screen  01/24/2025    Hepatitis A vaccine  Aged Out    Hepatitis B vaccine  Aged Out    Hib vaccine  Aged Out    Meningococcal (ACWY) vaccine  Aged Out    Pneumococcal 0-64 years Vaccine  Aged Out

## 2020-09-23 ENCOUNTER — TELEPHONE (OUTPATIENT)
Dept: INTERNAL MEDICINE CLINIC | Age: 45
End: 2020-09-23

## 2021-01-20 NOTE — FLOWSHEET NOTE
Writer called [employer] to check status of appeal- spoke with Estefanía. She states that an RN from the clinic name [facility], 12/28/2020 at 8:58am, requested the appeal for CPT Code 24628 be withdrawn due to a medication already being approved and on file for the patient. Ref # [id number]   [] [facility]) - [facility] &  Therapy  [address]  [phone]  F: [phone] [x] [address]  [facility]   [address]  P: [phone]  F: [phone] [] [address] &  Therapy  [address]  P: [phone]  F: [phone] [] [address]  [facility]   [address]   [place]: [phone]  F: [phone]      Physical Therapy Daily Treatment Note    Date:  2020  Patient Name:  Bony Mccullough    :  1975  MRN: [id number]  Physician: Dr. Clementina Rodney [facility]), unlimited visits  Medical Diagnosis: s/p right rotator cuff surgery       Rehab Codes: M25.511, M25.611, M79.621, M62.511  Onset Date: 2020 date of surgery                       Next 's appt:    2021  Visit# / total visits: 45/46    Cancels/No Shows: 2020 progress note sent to Dr. Fransisco Vang. Subjective:    Pain:  [x] Yes  [] No  Location: R shoulder  Pain Rating: (0-10 scale) 0-1/10  Pain altered Tx:  [x] No  [] Yes  Action:    Comments: Patient reports no pain after lifting grandson which did cause soreness afterwards in the past. 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                                                                                                                                                                                                                                                                                                                                                                             Objective:   Modalities:  gel shoulder sleeve to right shoulder, 10 minutes, end of treatmentresumed  due to increasing wts with some exercises  Precautions:hx tear to left side also - not surgically repaired  2-5-2020 date of medium tear right rotator cuff surgery, subscapular repair, bicep tenodesis      Exercises: bolded complete -  Exercise Reps/ Time Weight/ Level Comments   UBE 2.5 min forward, 2.5 back backward L 3.0 Added 4/30, 8/17 increased resistance     Arm at 90 flexion, joint stabilization 20x   Added 4/30, pressure in lower arm, forearm      PROM to right shoulder 10 min   Supine, flexion, focus on abduction             weighted ball on shelf stimulation- ther activity  10x 2   A  3.3# red  to simulate IV pole added 7/8 Regressed 7/13.  Intermittent rest breaks at top of shelf to stretch   Increased weight 8/16 to red   Wall walks, flexion and abduction 10x each   Monitoring ROM to prevent compensatory strategies, using bars on wall to slide up    5/12 able to reach top of bar without difficulty   ball circles on wall  25x each way red, 3.3 lbs  increased reps 6/18/20  Added body weight into ball this date      T band         Scapular rows 20x blueberry Increased resistance 6/3/20 Progressed resistance 7/2   ext 20x blueberry Increased reps 6/3/20 Progressed resistance 7/2   ER     20x blueberry Added 5/5, increased reps 6/25/20 Progressed resistance 7/2   abduction 20x blueberry Increased reps 6/25/20 Progressed resistance 7/2   Rotational pulling to simulate patient transfer 10x2 ea silver Added 8/20   scaption with LE lift 20x blue To simulate transfer and assist UEs               Standing flexion 20x 3# right, 3# left Added 5/7, 5/14 added wt  Increased weight 8/13   abduction 15x 3# 8/16 weight increased   Bicep curls 20x 3# each Increased reps 6/18/20   scaption \"Y\" 20x 3# Added 5/7, 5/14 added wt Increased  8/16    rows 20x 3# each  added 5/12 Increased reps 7/13             Scapular retraction 15x5\"   Added 3/18-         Shoulder roll 15x   Added 3/18   Cervical rotation 10x each way 5\" hold     Seated thoracic extension 10x 5\" hold  Added 2/24/20   Standing towel stretch 15x   Added 4/16  Working towards midline         Prone         rows 15x 4# Increased wt 6/29 Increased wt 8/20   ext 15x 4# Increased wt 6/29 increased wt 8/20   Horizontal abd 20x 2# Increased reps 6/29, thumb down  Added weight 8/20   Suprisinatus  10x AAROM Added 8/3   Ys  10x AAROM Added 8/3   Other:    REASSESS BY PRIMARY PT 8/10 /2020   Standing- Active  Flexion 146  Abduction 93°  IR thumb at T10  Flexion with in range 4/5  Abduction with in range 4/5     No need for pain medication the past few weeks      Treatment Charges: Mins Units   [x]  Modalities- gel ice 10 0   [x]  Ther Exercise 45 3   []  Manual Therapy     []  Ther Activities     []  Aquatics     []  Vasocompression     []  Other     Total Treatment time 45 3       Assessment: [x] Progressing toward goals. Able to complete exercises as charted, expect discharge after next visit. [] No change. [] Other:       STG: (to be met in 8 treatments- first  Weeks post op)  1. ? Pain: Controlled post operative pain 5- improving, taking Advil before bed  2. ? ROM: PROM in flexion to 90 degrees, PROM of ER in scapular plane to 20 degrees 5- met   3. Protect repair,    4. Patient to be independent with home exercise program as demonstrated by performance with correct form without cues. , met 3-16-20  5. Ability to sleep 4 hours in her bed (verses currently on sofa) -5/12/2020 improved with Advil, met  6.    LTG: (to be met in 16 treatments) (6 weeks post op)  1. ER in plane of scapula to 45 degrees - 5/28/2020 met  2. ER at 60 abduction to 45 degrees met  3. PROM of shoulder flexion to 120 met  4. Continue to manage pain and inflammation post surgical. met  5. Ability to perform scapular retraction and demonstrate upright posture entering clinic to prepare for further strengthening per protocol.      Additional goals for visits 17-30        Continue to meet above unmet goals        Progress

## 2021-07-19 ENCOUNTER — OFFICE VISIT (OUTPATIENT)
Dept: OBGYN CLINIC | Age: 46
End: 2021-07-19
Payer: COMMERCIAL

## 2021-07-19 ENCOUNTER — HOSPITAL ENCOUNTER (OUTPATIENT)
Age: 46
Setting detail: SPECIMEN
Discharge: HOME | End: 2021-07-19
Payer: COMMERCIAL

## 2021-07-19 VITALS
HEIGHT: 62 IN | SYSTOLIC BLOOD PRESSURE: 132 MMHG | WEIGHT: 223 LBS | DIASTOLIC BLOOD PRESSURE: 74 MMHG | BODY MASS INDEX: 41.04 KG/M2

## 2021-07-19 DIAGNOSIS — Z01.419 ENCOUNTER FOR GYNECOLOGICAL EXAMINATION: Primary | ICD-10-CM

## 2021-07-19 DIAGNOSIS — Z12.31 ENCOUNTER FOR SCREENING MAMMOGRAM FOR BREAST CANCER: ICD-10-CM

## 2021-07-19 PROCEDURE — 99386 PREV VISIT NEW AGE 40-64: CPT | Performed by: PHYSICIAN ASSISTANT

## 2021-07-19 ASSESSMENT — PATIENT HEALTH QUESTIONNAIRE - PHQ9
1. LITTLE INTEREST OR PLEASURE IN DOING THINGS: 1
2. FEELING DOWN, DEPRESSED OR HOPELESS: 1
SUM OF ALL RESPONSES TO PHQ QUESTIONS 1-9: 2
SUM OF ALL RESPONSES TO PHQ9 QUESTIONS 1 & 2: 2
SUM OF ALL RESPONSES TO PHQ QUESTIONS 1-9: 2
SUM OF ALL RESPONSES TO PHQ QUESTIONS 1-9: 2

## 2021-07-19 NOTE — PROGRESS NOTES
[facility]  [facility]  7/19/2021                       Primary Care Physician: Sunil Orosco MD    CC:   Chief Complaint   Patient presents with   [address] Annual Exam     Prev Pap: many years ago; Prev Hilaria: N/A         HPI: Kendy Macias is a 48 y.o. female [id number] Patient's last menstrual period was 07/15/2021. The patient was seen and examined. She is here for an annual visit. She is complaining of nothing. She denies irregular/heavy bleeding and dysmenorrhea. Her periods are regular and last 3 days. She describes them as light. Her bowel habits are regular. She has occasional bloating with certain foods. She admits to some \"overeating\" which causes the discomfort. She has no nausea or vomiting. She denies dysuria. She denies urinary leaking. She denies vaginal discharge. She is sexually active with single partner, contraception - none. She uses no method for contraception and is not desiring pregnancy. Depression Screen: Completed. She is emotional over the loss of her father 2 years ago. She does state she has a strong support system.   **If either question is answered in a  positive fashion then complete the PHQ9 Scoring Evaluation and make the appropriate referral**    REVIEW OF SYSTEMS:  Constitutional: negative fever, negative chills  HEENT: negative visual disturbances, negative headaches  Respiratory: negative dyspnea, negative cough  Cardiovascular: negative chest pain,  negative palpitations  Gastrointestinal: negative abdominal pain, negative RUQ pain, negative N/V, negative diarrhea, negative constipation  Genitourinary: negative dysuria, negative vaginal discharge  Dermatological: negative rash  Hematologic: negative bruising  Immunologic/Lymphatic: negative recent illness, negative recent sick contact  Musculoskeletal: negative back pain, negative myalgias, negative arthralgias  Neurological:  negative dizziness, negative weakness  Behavior/Psych: negative depression, negative anxiety      GYNECOLOGICAL HISTORY:  Age of Menarche: 4  LMP:7/15/21    Sexually Active: single partner, contraception - none  STD History: no past history    Pap History: Patient does not recall the results of last Pap test - states sometimes in the   Colposcopy History: denies however lack of screening     Permanent Sterilization: no  Reversible Birth Control: no   Hormone Replacement Exposure: no    OBSTETRICAL HISTORY:  OB History    Para Term  AB Living   6 2 2 0 4 2   SAB TAB Ectopic Molar Multiple Live Births   2 2 0 0 0 0      # Outcome Date GA Lbr Hu/2nd Weight Sex Delivery Anes PTL Lv   6 TAB            5 TAB            4 SAB            3 SAB            2 Term            1 Term                PAST MEDICAL HISTORY:   has a past medical history of Anxiety, Lumbar disc disease, Obesity (BMI 30-39.9), and Rotator cuff tear. PAST SURGICAL HISTORY:   has a past surgical history that includes Lumbar disc surgery; Shoulder arthroscopy (Right, 2020); and Shoulder arthroscopy (Right, 2020). ALLERGIES:  is allergic to bactrim [sulfamethoxazole-trimethoprim], ibuprofen, and other. MEDICATIONS:  Prior to Admission medications    Not on File       FAMILY HISTORY:  Family History of Breast, Ovarian, Colon or Uterine Cancer: Yes maternal grandmother uterine CA   family history includes Cancer (age of onset: 72) in her maternal grandmother; Diabetes in her paternal grandmother; Diabetes (age of onset: 6) in her brother; Diabetes (age of onset: 5) in her sister; High Blood Pressure in her mother; High Cholesterol in her father. SOCIAL HISTORY:   reports that she has never smoked. She has never used smokeless tobacco. She reports current alcohol use. She reports that she does not use drugs.     HEALTH MAINTENANCE:  Immunization status: up to date and documented  Garidisil immunization: results reviewed    Date of Last Mammogram: patient has never had a mammogram  Date of Last Colonoscopy: N/A  Date of Last Bone Density: N/A    VITALS:  Vitals:    07/19/21 1344   BP: 132/74   Position: Sitting   Cuff Size: Large Adult   Weight: 223 lb (101.2 kg)   Height: 5' 2\" (1.575 m)                                                                                                                                                               PHYSICAL EXAM:   General Appearance: Appears healthy. Alert; in no acute distress. Pleasant. Skin: Skin color, texture, turgor normal. No rashes or lesions. HEENT: normocephalic and atraumatic, Thyroid normal to inspection and palpation and non-palpable  Respiratory: Normal expansion. Clear to auscultation. No rales, rhonchi, or wheezing. Cardiovascular: normal rate, normal S1 and S2 and no gallops  Breast:  (Chest): normal appearance, no masses or tenderness, No nipple retraction or dimpling, No nipple discharge or bleeding, No axillary or supraclavicular adenopathy, Normal to palpation without dominant masses  Abdomen: soft, non-tender, non-distended, no right upper quadrant tenderness and no CVA tenderness, no abdominal scars  Pelvic Exam:   External genitalia: General appearance; normal, Hair distribution; normal, Lesions absent  Urinary system: urethral meatus normal  Vaginal: normal mucosa, no discharge, normal mucosa, scant blood  Cervix: normal appearing cervix without discharge or lesions, no discharge noted, multiparous os, Nabothian cyst at 12 o'clock  Adnexa: normal adnexa in size, nontender and no masses, no masses  Uterus: normal single, nontender and mid-position  Rectal Exam: exam declined by patient  Musculoskeletal: no gross abnormalities  Extremities: non-tender BLE and non-edematous  Psych:  oriented to time, place and person, mood and affect are within normal limits, pt is a good historian; no memory problems were noted     DATA:  No results found for this visit on 07/19/21.     ASSESSMENT & PLAN:    Jose Hilario is a 48 y.o. female [id number] Patient's last menstrual period was 07/15/2021.   - Co-testing performed   -Mammogram ordered   - F/u annual 1 year   - She is not eligible for the Garidisil immunization    Patient Active Problem List    Diagnosis Date Noted    Morbidly obese ([facility] [address] 07/27/2020    Right bicipital tenosynovitis 01/20/2020    Anxiety 05/03/2013    Complete tear of right rotator cuff 05/03/2013    Benign positional vertigo 03/01/2013    Left shoulder pain 03/01/2013    Injury of left shoulder 03/01/2013    Rotator cuff injury 02/05/2013    Shoulder pain, acute 02/05/2013       No follow-ups on file. No Patient Care Coordination Note on file. Counseling Completed:    discussed need for repeat pap as per American Society for Colposcopy and Cervical Pathology guidelines. discussed need for mammograms every 1 year, If >42 yo and last mammogram was negative. Discussed Calcium and Vitamin D dosing. Discussed need for colonoscopy screening as well as onset for bone density testing when age appropriate. Discussed birth control and barrier recommendations. Pt declined contraception at today's visit. discussed STD counseling and prevention. discussed Gardisil counseling for all patients 9-25 yo. Hereditary Breast, Ovarian, Colon and Uterine Cancer screening discussed. Tobacco & Secondary smoke risks discussed; with recommendation for cessation and avoidance. Routine health maintenance per patients PCP discussed. Patient was seen with total face to face time of 30 minutes. More than 50% of this visit was on counseling and education regarding the problems listed below and her options. She was also counseled on her preventative health maintenance recommendations and follow-up. Diagnosis Orders   1. Encounter for gynecological examination  PAP SMEAR   2.  Encounter for screening mammogram for breast cancer  ALVARO DIGITAL SCREEN W OR WO CAD BILATERAL        Sena

## 2021-07-21 LAB
CYTOLOGY REPORT: NORMAL
HPV SAMPLE: NORMAL
HPV, GENOTYPE 16: NOT DETECTED
HPV, GENOTYPE 18: NOT DETECTED
HPV, HIGH RISK OTHER: NOT DETECTED
HPV, INTERPRETATION: NORMAL
SPECIMEN DESCRIPTION: NORMAL

## 2021-08-12 DIAGNOSIS — M25.559 HIP PAIN: Primary | ICD-10-CM

## 2021-09-04 ENCOUNTER — HOSPITAL ENCOUNTER (OUTPATIENT)
Age: 46
Discharge: HOME | End: 2021-09-06
Payer: COMMERCIAL

## 2021-09-04 ENCOUNTER — HOSPITAL ENCOUNTER (OUTPATIENT)
Dept: GENERAL RADIOLOGY | Age: 46
Discharge: HOME | End: 2021-09-06
Payer: COMMERCIAL

## 2021-09-04 DIAGNOSIS — M25.559 HIP PAIN: ICD-10-CM

## 2021-09-04 PROCEDURE — 73502 X-RAY EXAM HIP UNI 2-3 VIEWS: CPT

## 2021-11-05 ENCOUNTER — VIRTUAL VISIT (OUTPATIENT)
Dept: INTERNAL MEDICINE CLINIC | Age: 46
End: 2021-11-05
Payer: COMMERCIAL

## 2021-11-05 DIAGNOSIS — J06.9 VIRAL URI: Primary | ICD-10-CM

## 2021-11-05 PROCEDURE — 99213 OFFICE O/P EST LOW 20 MIN: CPT | Performed by: NURSE PRACTITIONER

## 2021-11-05 RX ORDER — BENZONATATE 100 MG/1
100-200 CAPSULE ORAL 3 TIMES DAILY PRN
Qty: 60 CAPSULE | Refills: 0 | Status: SHIPPED | OUTPATIENT
Start: 2021-11-05 | End: 2021-11-12

## 2021-11-05 SDOH — ECONOMIC STABILITY: FOOD INSECURITY: WITHIN THE PAST 12 MONTHS, YOU WORRIED THAT YOUR FOOD WOULD RUN OUT BEFORE YOU GOT MONEY TO BUY MORE.: NEVER TRUE

## 2021-11-05 SDOH — ECONOMIC STABILITY: FOOD INSECURITY: WITHIN THE PAST 12 MONTHS, THE FOOD YOU BOUGHT JUST DIDN'T LAST AND YOU DIDN'T HAVE MONEY TO GET MORE.: NEVER TRUE

## 2021-11-05 ASSESSMENT — ENCOUNTER SYMPTOMS
COLOR CHANGE: 0
RHINORRHEA: 1
NAUSEA: 0
ABDOMINAL PAIN: 0
COUGH: 1
CONSTIPATION: 0
SORE THROAT: 0
VOMITING: 0
SHORTNESS OF BREATH: 0
WHEEZING: 0
TROUBLE SWALLOWING: 0
DIARRHEA: 0
CHEST TIGHTNESS: 1

## 2021-11-05 ASSESSMENT — SOCIAL DETERMINANTS OF HEALTH (SDOH): HOW HARD IS IT FOR YOU TO PAY FOR THE VERY BASICS LIKE FOOD, HOUSING, MEDICAL CARE, AND HEATING?: NOT HARD AT ALL

## 2021-11-05 NOTE — PROGRESS NOTES
Visit Information    Have you changed or started any medications since your last visit including any over-the-counter medicines, vitamins, or herbal medicines? no   Are you having any side effects from any of your medications? -  no  Have you stopped taking any of your medications? Is so, why? -  no    Have you seen any other physician or provider since your last visit? No  Have you had any other diagnostic tests since your last visit? No  Have you been seen in the emergency room and/or had an admission to a hospital since we last saw you? No  Have you had your routine dental cleaning in the past 6 months? yes -     Have you activated your Before the Call account? If not, what are your barriers?  Yes     Patient Care Team:  Grayson Yuan MD as PCP - General (Internal Medicine)  Grayson Yuan MD as PCP - [facility] Provider    Medical History Review  Past Medical, Family, and Social History reviewed and does contribute to the patient presenting condition    Health Maintenance   Topic Date Due    Hepatitis C screen  Never done    HIV screen  Never done    DTaP/Tdap/Td vaccine (1 - Tdap) Never done    Colon cancer screen colonoscopy  Never done    Flu vaccine (1) 09/01/2021    Diabetes screen  01/24/2023    Lipid screen  01/24/2025    Cervical cancer screen  07/19/2026    COVID-19 Vaccine  Completed    Hepatitis A vaccine  Aged Out    Hepatitis B vaccine  Aged Out    Hib vaccine  Aged Out    Meningococcal (ACWY) vaccine  Aged Out    Pneumococcal 0-64 years Vaccine  Aged Out         [address]  [place] [address]  Dept: [phone]  Dept Fax: [phone]    Virtual Visit Progress Note  Date of patient's visit: 11/22/2021  Patient's Name:  Laura Victor YOB: 1975            Patient Care Team:  Grayson Yuan MD as PCP - General (Internal Medicine)  Grayson Yuan MD as PCP - [facility]    REASON FOR VISIT:  Same day visit    HISTORY OF PRESENT ILLNESS:      Chief Complaint   Patient presents with    Cough     taking nyquil and Mucinex otc  onset Oct 28th       History was obtained from the patient. Danna Adame is a 48 y.o. female here today virtually for uri symptoms     10/30/21- sore throat, congestion, cough, couldn't get out of bed. Chills    Tuesday tested for COVID 19. Test result was negative     Patient endorses continued productive cough, taking nyquil, mucinex for symptom management. Patient Active Problem List   Diagnosis    Rotator cuff injury    Shoulder pain, acute    Benign positional vertigo    Left shoulder pain    Injury of left shoulder    Anxiety    Complete tear of right rotator cuff    Right bicipital tenosynovitis    Morbidly obese ([address]       MEDICATIONS:      No current outpatient medications on file. No current facility-administered medications for this visit. ALLERGIES:      Allergies   Allergen Reactions    Bactrim [Sulfamethoxazole-Trimethoprim]      anaphalyaxis    Ibuprofen Itching    Other      Ocean perch  What ever they put on it to preserve it        SOCIAL HISTORY   Reviewed and no change from previous record. [employer]  reports that she has never smoked. She has never used smokeless tobacco.    FAMILY HISTORY:    Reviewed and No change from previous visit    REVIEW OF SYSTEMS:    Review of Systems   Constitutional: Positive for chills. Negative for diaphoresis, fatigue, fever and unexpected weight change. HENT: Positive for congestion (slight) and rhinorrhea. Negative for postnasal drip, sneezing, sore throat and trouble swallowing. Eyes: Negative for visual disturbance. Respiratory: Positive for cough (nonstop cough) and chest tightness. Negative for shortness of breath and wheezing. Cardiovascular: Negative for chest pain. Gastrointestinal: Negative for abdominal pain, constipation, diarrhea, nausea and vomiting.    Endocrine: Negative for polydipsia, polyphagia and polyuria. Genitourinary: Negative for difficulty urinating, dysuria, flank pain, frequency and urgency. Musculoskeletal: Negative for arthralgias. Skin: Negative for color change and rash. Neurological: Negative for dizziness, tremors, syncope, weakness and numbness. Psychiatric/Behavioral: Negative for confusion and hallucinations. PHYSICAL EXAM:    There were no vitals filed for this visit. Exam was limited due to virtual encounter. General - alert, well appearing, non toxic, in no distress  Skin - normal coloration and turgor, no rash  Eyes - sclera appear clear, no conjunctival injection, no discharge  Ears - no pain with manipulation of tragus or auricle, no drainage, no mastoid tenderness, hearing normal   Nose - normal and patent, no erythema, discharge  Mouth - mucous membranes are moist  Neck - supple, no masses appreciated  Chest - respirations are unlabored, no tachypnea or signs or respiratory distress  Abdomen - soft, nontender, nondistended  Neurological - alert, oriented x 3, normal speech  Extremities - no pedal edema    ASSESSMENT AND PLAN:      1. Viral URI  - continue supportive care measures, rest/fluids/OTC pain medications  - educated that viral URI can last from 7-10 days; if symptoms persist follow up with office for antibiotic  - benzonatate (TESSALON) 100 MG capsule; Take 1-2 capsules by mouth 3 times daily as needed for Cough  Dispense: 60 capsule; Refill: 0      FOLLOW UP AND INSTRUCTIONS:   No follow-ups on file. Discussed use, benefit, and side effects of prescribed medications. All patient questions answered. Patient voiced understanding. Patient given educational materials - see patient instructions    Patient being evaluated by a Virtual Visit (video visit) encounter to address concerns as mentioned above. A caregiver was present when appropriate.  Due to this being a TeleHealth encounter (During DDRFV-17 public health emergency), evaluation of the following organ systems was limited: Vitals/Constitutional/EENT/Resp/CV/GI//MS/Neuro/Skin/Heme-Lymph-Imm. Pursuant to the emergency declaration under the [facility], [address] [place] [address] authority and the [employer] Act, this Virtual Visit was conducted with patient's (and/or legal guardian's) consent, to reduce the patient's risk of exposure to COVID-19 and provide necessary medical care. The patient (and/or legal guardian) has also been advised to contact this office for worsening conditions or problems, and seek emergency medical treatment and/or call 911 if deemed necessary. Services were provided through a video synchronous discussion virtually to substitute for in-person clinic visit. Patient and provider were located at their individual homes. STEPHANIA Ozuna - CNP   [facility]  11/22/2021, 9:13 AM    Please note that this chart wasgenerated using voice recognition Dragon dictation software. Although every effort was made to ensure the accuracy of this automated transcription, some errors in transcription may have occurred.

## 2021-11-09 ENCOUNTER — TELEPHONE (OUTPATIENT)
Dept: INTERNAL MEDICINE CLINIC | Age: 46
End: 2021-11-09

## 2021-11-09 NOTE — TELEPHONE ENCOUNTER
----- Message from Karan Merino sent at 11/9/2021  9:48 AM EST -----  Subject: Message to Provider    QUESTIONS  Information for Provider? Pt called in and said that her covidc test was   negative and a had a vv and she is wanting to go back to work she is   feeling better, she is asking for a return to work date and slip she has   asking that she is wanting it email to her [email or web address] and to   [facility] as well please follow up  ---------------------------------------------------------------------------  --------------  CALL BACK INFO  What is the best way for the office to contact you? OK to leave message on   [employer]  Preferred Call Back Phone Number? [phone]  ---------------------------------------------------------------------------  --------------  SCRIPT ANSWERS  Relationship to Patient?  Self

## 2021-11-09 NOTE — TELEPHONE ENCOUNTER
----- Message from Carmela Kinsey sent at 11/9/2021 12:04 PM EST -----  Subject: Message to Provider    QUESTIONS  Information for Provider? Pt requesting if slip be filled out to return to   work on Thursday 11/11  ---------------------------------------------------------------------------  --------------  [employer] INFO  What is the best way for the office to contact you? OK to leave message on   voicemail  Preferred Call Back Phone Number? [phone]  ---------------------------------------------------------------------------  --------------  SCRIPT ANSWERS  Relationship to Patient?  Self

## 2021-11-30 ENCOUNTER — TELEPHONE (OUTPATIENT)
Dept: INTERNAL MEDICINE CLINIC | Age: 46
End: 2021-11-30

## 2021-11-30 NOTE — TELEPHONE ENCOUNTER
Called patient to inform her that I can not fax her papers until she comes in and completed her portion and signs her side of it

## 2021-12-08 DIAGNOSIS — Z63.4 BEREAVEMENT: Primary | ICD-10-CM

## 2021-12-08 RX ORDER — TRAZODONE HYDROCHLORIDE 100 MG/1
100 TABLET ORAL NIGHTLY
Qty: 90 TABLET | Refills: 1 | Status: SHIPPED | OUTPATIENT
Start: 2021-12-08

## 2021-12-08 SDOH — SOCIAL STABILITY - SOCIAL INSECURITY: DISSAPEARANCE AND DEATH OF FAMILY MEMBER: Z63.4

## 2022-01-19 ENCOUNTER — NURSE ONLY (OUTPATIENT)
Dept: INTERNAL MEDICINE CLINIC | Age: 47
End: 2022-01-19

## 2022-01-19 DIAGNOSIS — Z23 FLU VACCINE NEED: Primary | ICD-10-CM

## 2022-01-19 PROCEDURE — 90674 CCIIV4 VAC NO PRSV 0.5 ML IM: CPT | Performed by: INTERNAL MEDICINE

## 2022-01-19 PROCEDURE — 90471 IMMUNIZATION ADMIN: CPT | Performed by: INTERNAL MEDICINE

## 2022-01-19 NOTE — PROGRESS NOTES
Are you sick today? no    Are you allergic to eggs? no    Have you ever had a reaction to the flu vaccine? no    Have you ever had Skip Caraballo- barre's Syndrome? no    Per order from provider VIS given to patient, consent received,  flu vaccine was administered and documented in vaccine part of chart patient tolerated well.

## 2022-06-29 ENCOUNTER — OFFICE VISIT (OUTPATIENT)
Dept: FAMILY MEDICINE CLINIC | Age: 47
End: 2022-06-29

## 2022-06-29 VITALS
HEART RATE: 68 BPM | DIASTOLIC BLOOD PRESSURE: 92 MMHG | SYSTOLIC BLOOD PRESSURE: 146 MMHG | OXYGEN SATURATION: 98 % | TEMPERATURE: 98.7 F

## 2022-06-29 DIAGNOSIS — R05.9 COUGH: ICD-10-CM

## 2022-06-29 DIAGNOSIS — G89.29 CHRONIC BILATERAL LOW BACK PAIN WITHOUT SCIATICA: ICD-10-CM

## 2022-06-29 DIAGNOSIS — R09.82 PND (POST-NASAL DRIP): ICD-10-CM

## 2022-06-29 DIAGNOSIS — J40 BRONCHITIS: Primary | ICD-10-CM

## 2022-06-29 DIAGNOSIS — M62.838 MUSCLE SPASM: ICD-10-CM

## 2022-06-29 DIAGNOSIS — M54.50 CHRONIC BILATERAL LOW BACK PAIN WITHOUT SCIATICA: ICD-10-CM

## 2022-06-29 PROCEDURE — 96372 THER/PROPH/DIAG INJ SC/IM: CPT

## 2022-06-29 PROCEDURE — 99213 OFFICE O/P EST LOW 20 MIN: CPT

## 2022-06-29 RX ORDER — AZITHROMYCIN 250 MG/1
250 TABLET, FILM COATED ORAL SEE ADMIN INSTRUCTIONS
Qty: 6 TABLET | Refills: 0 | Status: SHIPPED | OUTPATIENT
Start: 2022-06-29 | End: 2022-07-04

## 2022-06-29 RX ORDER — BENZONATATE 200 MG/1
200 CAPSULE ORAL 3 TIMES DAILY PRN
Qty: 21 CAPSULE | Refills: 0 | Status: SHIPPED | OUTPATIENT
Start: 2022-06-29 | End: 2022-07-06

## 2022-06-29 RX ORDER — CYCLOBENZAPRINE HCL 5 MG
5 TABLET ORAL 3 TIMES DAILY PRN
Qty: 12 TABLET | Refills: 0 | Status: SHIPPED | OUTPATIENT
Start: 2022-06-29 | End: 2022-07-03

## 2022-06-29 RX ORDER — TRIAMCINOLONE ACETONIDE 40 MG/ML
40 INJECTION, SUSPENSION INTRA-ARTICULAR; INTRAMUSCULAR ONCE
Status: COMPLETED | OUTPATIENT
Start: 2022-06-29 | End: 2022-06-29

## 2022-06-29 RX ORDER — FLUTICASONE PROPIONATE 50 MCG
2 SPRAY, SUSPENSION (ML) NASAL DAILY
Qty: 16 G | Refills: 0 | Status: SHIPPED | OUTPATIENT
Start: 2022-06-29

## 2022-06-29 RX ADMIN — TRIAMCINOLONE ACETONIDE 40 MG: 40 INJECTION, SUSPENSION INTRA-ARTICULAR; INTRAMUSCULAR at 16:15

## 2022-06-29 ASSESSMENT — ENCOUNTER SYMPTOMS
SHORTNESS OF BREATH: 0
BACK PAIN: 1
DIARRHEA: 0
EYE DISCHARGE: 0
ABDOMINAL PAIN: 0
EYE PAIN: 0
BOWEL INCONTINENCE: 0
RHINORRHEA: 1
CONSTIPATION: 0
COUGH: 1
EYE ITCHING: 1

## 2022-06-29 NOTE — PROGRESS NOTES
[address] [place] [address] [place] [address]  Dept: [phone]  Dept Fax: [phone]    Chele Berger is a 50 y.o. female who presents to the urgent care today for her medical conditions/complaints as notedbelow. Chele Berger is c/o of Allergies (onset 1 wk ago, tried OTC medication with no relief), Nasal Congestion ( onset 2 wks ago, slight congestion), Back Pain (onset 1 wk ago, radiates to lower back on RT side, tried OTC aand heating pad), and Cough (dry cough, 1 wk ago )      HPI:     COVID vaccine? Yes, 2    Patient has a history of back surgery    Back Pain  This is a recurrent problem. The current episode started in the past 7 days. The problem occurs constantly. The problem has been gradually worsening since onset. The pain is present in the lumbar spine. The quality of the pain is described as aching. The pain does not radiate. The pain is moderate. The pain is the same all the time. The symptoms are aggravated by bending. Associated symptoms include headaches. Pertinent negatives include no abdominal pain, bladder incontinence, bowel incontinence, chest pain, dysuria, fever, numbness, paresis, paresthesias, pelvic pain or tingling. Risk factors include lack of exercise, obesity and sedentary lifestyle. She has tried heat, walking, NSAIDs and ice (stretching) for the symptoms. The treatment provided mild relief. Cough  This is a new problem. The current episode started in the past 7 days. The problem has been gradually worsening. The problem occurs constantly. The cough is non-productive. Associated symptoms include headaches, nasal congestion, postnasal drip and rhinorrhea. Pertinent negatives include no chest pain, chills, ear congestion, ear pain, fever, myalgias or shortness of breath. The symptoms are aggravated by pollens. Treatments tried: claritin. The treatment provided no relief.  Her past medical history is significant for environmental allergies. There is no history of asthma, bronchitis or COPD. Past Medical History:   Diagnosis Date    Anxiety 5/3/2013    NO MED STOPPED [facility] DID NOT LIKE SIDE EFFECTS TO FOLLOW WITH DR FOR ALTERNATE MED    Lumbar disc disease     Obesity (BMI 30-39.9) 1/22/2020    Rotator cuff tear 01/19/2013    TO HAVE SURGERY 07/2013, TEAR HAPPENED WHEN BENCH PRESSING WEIGHTS        Current Outpatient Medications   Medication Sig Dispense Refill    azithromycin (ZITHROMAX) 250 MG tablet Take 1 tablet by mouth See Admin Instructions for 5 days 500mg on day 1 followed by 250mg on days 2 - 5 6 tablet 0    benzonatate (TESSALON) 200 MG capsule Take 1 capsule by mouth 3 times daily as needed for Cough 21 capsule 0    fluticasone (FLONASE) 50 MCG/ACT nasal spray 2 sprays by Each Nostril route daily 16 g 0    cyclobenzaprine (FLEXERIL) 5 MG tablet Take 1 tablet by mouth 3 times daily as needed for Muscle spasms 12 tablet 0    traZODone (DESYREL) 100 MG tablet Take 1 tablet by mouth nightly (Patient not taking: Reported on 6/29/2022) 90 tablet 1     No current facility-administered medications for this visit. Allergies   Allergen Reactions    Bactrim [Sulfamethoxazole-Trimethoprim]      anaphalyaxis    Ibuprofen Itching    Other      Ocean perch  What ever they put on it to preserve it        Subjective:      Review of Systems   Constitutional: Positive for activity change. Negative for chills and fever. HENT: Positive for postnasal drip and rhinorrhea. Negative for ear pain. Eyes: Positive for itching. Negative for pain, discharge and visual disturbance. Respiratory: Positive for cough. Negative for shortness of breath. Cardiovascular: Negative for chest pain. Gastrointestinal: Negative for abdominal pain, bowel incontinence, constipation and diarrhea.    Genitourinary: Negative for bladder incontinence, difficulty urinating, dysuria and pelvic pain.   Musculoskeletal: Positive for back pain. Negative for gait problem and myalgias. Allergic/Immunologic: Positive for environmental allergies. Neurological: Positive for headaches. Negative for dizziness, tingling, numbness and paresthesias. All other systems reviewed and are negative. 14 systems reviewed and negative except as listed in HPI. Objective:     Physical Exam  Vitals reviewed. Constitutional:       General: She is not in acute distress. Appearance: She is ill-appearing. She is not toxic-appearing or diaphoretic. HENT:      Head: Normocephalic. Right Ear: Tympanic membrane normal.      Left Ear: No drainage, swelling or tenderness. A middle ear effusion is present. Tympanic membrane is not injected, perforated or erythematous. Nose:      Right Sinus: No maxillary sinus tenderness or frontal sinus tenderness. Left Sinus: No maxillary sinus tenderness or frontal sinus tenderness. Mouth/Throat:      Pharynx: Pharyngeal swelling, oropharyngeal exudate (PND) and posterior oropharyngeal erythema present. Eyes:      General:         Right eye: No discharge. Left eye: No discharge. Conjunctiva/sclera: Conjunctivae normal.      Pupils: Pupils are equal, round, and reactive to light. Cardiovascular:      Rate and Rhythm: Normal rate and regular rhythm. Heart sounds: Normal heart sounds. Pulmonary:      Effort: Pulmonary effort is normal. No respiratory distress. Breath sounds: Normal breath sounds. No stridor. No wheezing, rhonchi or rales. Chest:      Chest wall: No tenderness. Musculoskeletal:         General: Tenderness present. No signs of injury. Cervical back: Normal range of motion. Tenderness present. No rigidity. Thoracic back: Normal.      Lumbar back: Spasms and tenderness present. No swelling, edema, deformity, signs of trauma or bony tenderness. Decreased range of motion.  Negative right straight leg raise test and negative left straight leg raise test. No scoliosis. Right lower leg: No edema. Left lower leg: No edema. Lymphadenopathy:      Cervical: Cervical adenopathy present. Skin:     General: Skin is warm and dry. Capillary Refill: Capillary refill takes less than 2 seconds. Neurological:      Mental Status: She is alert and oriented to person, place, and time. Psychiatric:         Behavior: Behavior normal.         Thought Content: Thought content normal.         Judgment: Judgment normal.       BP (!) 146/92   Pulse 68   Temp 98.7 °F (37.1 °C)   SpO2 98%     Assessment:       Diagnosis Orders   1. Bronchitis  azithromycin (ZITHROMAX) 250 MG tablet   2. Cough  benzonatate (TESSALON) 200 MG capsule   3. PND (post-nasal drip)  fluticasone (FLONASE) 50 MCG/ACT nasal spray   4. Chronic bilateral low back pain without sciatica  cyclobenzaprine (FLEXERIL) 5 MG tablet    triamcinolone acetonide (KENALOG-40) injection 40 mg   5. Muscle spasm  cyclobenzaprine (FLEXERIL) 5 MG tablet       Plan:   -Based on the duration and severity of the symptoms-- I will treat this as bacterial at this time.  -Patient instructed to complete antibiotic prescription fully. -May use Motrin/Tylenol for fever/pain.  -Instructed to increase fluid intake.   -Suggested humidifier and mist therapy.  -Avoid irritants such as smoke. -Tessalon for cough  -Motrin 800 mg TID for the discomfort/inflammation.  -Flexeril prn for the muscle spasms/pain.  -Heat therapy if desired.  -Home stretches provided on AVS.  -kenalog injection given in office today for back pain, patient tolerated well  -Patient agreeable to treatment plan.  -Educational materials provided on AVS.  -Follow up if symptoms do not improve/worsen. Return if symptoms worsen or fail to improve.     Orders Placed This Encounter   Medications    azithromycin (ZITHROMAX) 250 MG tablet     Sig: Take 1 tablet by mouth See Admin Instructions for 5 days 500mg on day 1 followed by 250mg on days 2 - 5     Dispense:  6 tablet     Refill:  0    benzonatate (TESSALON) 200 MG capsule     Sig: Take 1 capsule by mouth 3 times daily as needed for Cough     Dispense:  21 capsule     Refill:  0    fluticasone (FLONASE) 50 MCG/ACT nasal spray     Si sprays by Each Nostril route daily     Dispense:  16 g     Refill:  0    cyclobenzaprine (FLEXERIL) 5 MG tablet     Sig: Take 1 tablet by mouth 3 times daily as needed for Muscle spasms     Dispense:  12 tablet     Refill:  0    triamcinolone acetonide (KENALOG-40) injection 40 mg         Patient given educational materials - see patient instructions. Discussed use, benefit, and side effects of prescribed medications. All patient questions answered. Pt voiced understanding.     Electronically signed by STEPHANIA Hernandez NP on 2022 at 4:35 PM

## 2022-06-29 NOTE — PATIENT INSTRUCTIONS
Patient Education        Back Pain: Care Instructions  Your Care Instructions     Back pain has many possible causes. It is often related to problems with muscles and ligaments of the back. It may also be related to problems with the nerves, discs, or bones of the back. Moving, lifting, standing, sitting, or sleeping in an awkward way can strain the back. Sometimes you don't notice theinjury until later. Arthritis is another common cause of back pain. Although it may hurt a lot, back pain usually improves on its own within several weeks. Most people recover in 12 weeks or less. Using good home treatment and being careful not to stress your back can help you feel bettersooner. Follow-up care is a key part of your treatment and safety. Be sure to make and go to all appointments, and call your doctor if you are having problems. It's also a good idea to know your test results and keep alist of the medicines you take. How can you care for yourself at home?  Sit or lie in positions that are most comfortable and reduce your pain. Try one of these positions when you lie down:  ? Lie on your back with your knees bent and supported by large pillows. ? Lie on the floor with your legs on the seat of a sofa or chair. ? Lie on your side with your knees and hips bent and a pillow between your legs. ? Lie on your stomach if it does not make pain worse.  Do not sit up in bed, and avoid soft couches and twisted positions. Bed rest can help relieve pain at first, but it delays healing. Avoid bed rest after the first day of back pain.  Change positions every 30 minutes. If you must sit for long periods of time, take breaks from sitting. Get up and walk around, or lie in a comfortable position.  Try using a heating pad on a low or medium setting for 15 to 20 minutes every 2 or 3 hours. Try a warm shower in place of one session with the heating pad.  You can also try an ice pack for 10 to 15 minutes every 2 to 3 hours. Put a thin cloth between the ice pack and your skin.  Take pain medicines exactly as directed. ? If the doctor gave you a prescription medicine for pain, take it as prescribed. ? If you are not taking a prescription pain medicine, ask your doctor if you can take an over-the-counter medicine.  Take short walks several times a day. You can start with 5 to 10 minutes, 3 or 4 times a day, and work up to longer walks. Walk on level surfaces and avoid hills and stairs until your back is better.  Return to work and other activities as soon as you can. Continued rest without activity is usually not good for your back.  To prevent future back pain, do exercises to stretch and strengthen your back and stomach. Learn how to use good posture, safe lifting techniques, and proper body mechanics. When should you call for help? Call your doctor now or seek immediate medical care if:     You have new or worsening numbness in your legs.      You have new or worsening weakness in your legs. (This could make it hard to stand up.)      You lose control of your bladder or bowels. Watch closely for changes in your health, and be sure to contact your doctor if:     You have a fever, lose weight, or don't feel well.      You do not get better as expected. Where can you learn more? Go to https://CoCollage.[employer]. org and sign in to your [employer] account. Enter T138 in the Effektif box to learn more about \"Back Pain: Care Instructions. \"     If you do not have an account, please click on the \"Sign Up Now\" link. Current as of: March 9, 2022               Content Version: 13.3  © 2006-2022 Healthwise, Incorporated. Care instructions adapted under license by [facility] ([facility]). If you have questions about a medical condition or this instruction, always ask your healthcare professional. [place] [address] any warranty or liability for your use of this information.          Patient Education        Bronchitis: Care Instructions  Your Care Instructions     Bronchitis is inflammation of the bronchial tubes, which carry air to the lungs. The tubes swell and produce mucus, or phlegm. The mucus and inflamedbronchial tubes make you cough. You may have trouble breathing. Most cases of bronchitis are caused by viruses like those that cause colds. Antibiotics usually do not help and they may be harmful. Bronchitis usually develops rapidly and lasts about 2 to 3 weeks in otherwisehealthy people. Follow-up care is a key part of your treatment and safety. Be sure to make and go to all appointments, and call your doctor if you are having problems. It's also a good idea to know your test results and keep alist of the medicines you take. How can you care for yourself at home?  Take all medicines exactly as prescribed. Call your doctor if you think you are having a problem with your medicine.  Get some extra rest.   Take an over-the-counter pain medicine, such as acetaminophen (Tylenol), ibuprofen (Advil, Motrin), or naproxen (Aleve) to reduce fever and relieve body aches. Read and follow all instructions on the label.  Do not take two or more pain medicines at the same time unless the doctor told you to. Many pain medicines have acetaminophen, which is Tylenol. Too much acetaminophen (Tylenol) can be harmful.  Take an over-the-counter cough medicine to help quiet a dry, hacking cough so that you can sleep. Avoid cough medicines that have more than one active ingredient. Read and follow all instructions on the label.  Do not smoke. Smoking can make bronchitis worse. If you need help quitting, talk to your doctor about stop-smoking programs and medicines. These can increase your chances of quitting for good. When should you call for help? Call 911 anytime you think you may need emergency care. For example, call if:     You have severe trouble breathing.    Call your doctor now or seek immediate medical care if:     You have new or worse trouble breathing.      You cough up dark brown or bloody mucus (sputum).      You have a new or higher fever.      You have a new rash. Watch closely for changes in your health, and be sure to contact your doctor if:     You cough more deeply or more often, especially if you notice more mucus or a change in the color of your mucus.      You are not getting better as expected. Where can you learn more? Go to https://[employer]. org and sign in to your [employer] account. Enter H333 in the Game Closure box to learn more about \"Bronchitis: Care Instructions. \"     If you do not have an account, please click on the \"Sign Up Now\" link. Current as of: March 9, 2022               Content Version: 13.3  © 7725-5866 Healthwise, Incorporated. Care instructions adapted under license by [facility] ([facility]). If you have questions about a medical condition or this instruction, always ask your healthcare professional. [place] [address] any warranty or liability for your use of this information.

## 2022-11-02 ENCOUNTER — TELEPHONE (OUTPATIENT)
Dept: INTERNAL MEDICINE CLINIC | Age: 47
End: 2022-11-02

## 2022-12-12 ENCOUNTER — TELEPHONE (OUTPATIENT)
Dept: INTERNAL MEDICINE CLINIC | Age: 47
End: 2022-12-12

## 2022-12-12 DIAGNOSIS — J06.9 URTI (ACUTE UPPER RESPIRATORY INFECTION): Primary | ICD-10-CM

## 2022-12-12 RX ORDER — METHYLPREDNISOLONE 4 MG/1
TABLET ORAL
Qty: 1 KIT | Refills: 0 | Status: SHIPPED | OUTPATIENT
Start: 2022-12-12 | End: 2022-12-18

## 2022-12-12 RX ORDER — AZITHROMYCIN 250 MG/1
250 TABLET, FILM COATED ORAL SEE ADMIN INSTRUCTIONS
Qty: 6 TABLET | Refills: 0 | Status: SHIPPED | OUTPATIENT
Start: 2022-12-12 | End: 2022-12-17

## 2022-12-12 NOTE — TELEPHONE ENCOUNTER
Sick Call    Dayday Ritter  1975  [phone]  Tomasa Baker MD  Allergies   Allergen Reactions    Bactrim [Sulfamethoxazole-Trimethoprim]      anaphalyaxis    Ibuprofen Itching    Other      Ocean perch  What ever they put on it to preserve it        Last visit: 11/5/21  Reason for No Same Day appt: None available    Complaint: Patient states she is having chest congestion, cough, fever, diarrhea and weakness. Patient states she had her flu shot last week.  Would like to know if you can call in anything for her      Pharmacy: [facility]

## 2022-12-14 ENCOUNTER — NURSE ONLY (OUTPATIENT)
Dept: FAMILY MEDICINE CLINIC | Age: 47
End: 2022-12-14

## 2022-12-14 DIAGNOSIS — J06.9 URTI (ACUTE UPPER RESPIRATORY INFECTION): Primary | ICD-10-CM

## 2022-12-14 LAB
INFLUENZA A ANTIBODY: POSITIVE
INFLUENZA B ANTIBODY: NEGATIVE

## 2022-12-15 ENCOUNTER — TELEPHONE (OUTPATIENT)
Dept: INTERNAL MEDICINE CLINIC | Age: 47
End: 2022-12-15

## 2022-12-15 NOTE — TELEPHONE ENCOUNTER
Notified patient who voiced understanding.   Patient states she may need fmla papers filled out since she has been off since Sat the 10th of December

## 2022-12-15 NOTE — TELEPHONE ENCOUNTER
Patient called to get flu results. Patient is positive for influenza A. Patient would like to know if you will approve a couple more days off of work as she is still not feeling well.     Please advise

## 2023-03-24 ENCOUNTER — TELEPHONE (OUTPATIENT)
Dept: INTERNAL MEDICINE CLINIC | Age: 48
End: 2023-03-24

## 2023-03-24 NOTE — TELEPHONE ENCOUNTER
----- Message from Girish Alaniz sent at 3/24/2023  2:37 PM EDT -----  Subject: Message to Provider    QUESTIONS  Information for Provider? pt refused NT for chest pain and feeling tried    Made appt for physical on 4/3/2023 per pt request   ---------------------------------------------------------------------------  --------------  [id number]  [phone]; Do not leave any message, patient will call back for answer  ---------------------------------------------------------------------------  --------------  SCRIPT ANSWERS  Relationship to Patient?  Self

## 2023-04-03 ENCOUNTER — OFFICE VISIT (OUTPATIENT)
Dept: INTERNAL MEDICINE CLINIC | Age: 48
End: 2023-04-03
Payer: COMMERCIAL

## 2023-04-03 VITALS
HEIGHT: 62 IN | BODY MASS INDEX: 41.59 KG/M2 | DIASTOLIC BLOOD PRESSURE: 80 MMHG | OXYGEN SATURATION: 98 % | WEIGHT: 226 LBS | SYSTOLIC BLOOD PRESSURE: 126 MMHG | HEART RATE: 62 BPM

## 2023-04-03 DIAGNOSIS — Z12.11 SCREEN FOR COLON CANCER: ICD-10-CM

## 2023-04-03 DIAGNOSIS — Z12.31 ENCOUNTER FOR SCREENING MAMMOGRAM FOR MALIGNANT NEOPLASM OF BREAST: ICD-10-CM

## 2023-04-03 DIAGNOSIS — Z00.00 ENCOUNTER FOR WELL ADULT EXAM WITHOUT ABNORMAL FINDINGS: ICD-10-CM

## 2023-04-03 DIAGNOSIS — F33.41 RECURRENT MAJOR DEPRESSIVE DISORDER, IN PARTIAL REMISSION (HCC): ICD-10-CM

## 2023-04-03 DIAGNOSIS — E66.01 MORBIDLY OBESE (HCC): Primary | ICD-10-CM

## 2023-04-03 PROCEDURE — 99213 OFFICE O/P EST LOW 20 MIN: CPT | Performed by: INTERNAL MEDICINE

## 2023-04-03 RX ORDER — TOPIRAMATE 25 MG/1
25 TABLET ORAL NIGHTLY
Qty: 60 TABLET | Refills: 3 | Status: SHIPPED | OUTPATIENT
Start: 2023-04-03

## 2023-04-03 RX ORDER — PHENTERMINE HYDROCHLORIDE 15 MG/1
15 CAPSULE ORAL EVERY MORNING
Qty: 30 CAPSULE | Refills: 0 | Status: SHIPPED | OUTPATIENT
Start: 2023-04-03 | End: 2023-05-03

## 2023-04-03 SDOH — ECONOMIC STABILITY: FOOD INSECURITY: WITHIN THE PAST 12 MONTHS, THE FOOD YOU BOUGHT JUST DIDN'T LAST AND YOU DIDN'T HAVE MONEY TO GET MORE.: NEVER TRUE

## 2023-04-03 SDOH — ECONOMIC STABILITY: INCOME INSECURITY: HOW HARD IS IT FOR YOU TO PAY FOR THE VERY BASICS LIKE FOOD, HOUSING, MEDICAL CARE, AND HEATING?: NOT HARD AT ALL

## 2023-04-03 SDOH — ECONOMIC STABILITY: FOOD INSECURITY: WITHIN THE PAST 12 MONTHS, YOU WORRIED THAT YOUR FOOD WOULD RUN OUT BEFORE YOU GOT MONEY TO BUY MORE.: NEVER TRUE

## 2023-04-03 SDOH — ECONOMIC STABILITY: HOUSING INSECURITY
IN THE LAST 12 MONTHS, WAS THERE A TIME WHEN YOU DID NOT HAVE A STEADY PLACE TO SLEEP OR SLEPT IN A SHELTER (INCLUDING NOW)?: NO

## 2023-04-03 ASSESSMENT — PATIENT HEALTH QUESTIONNAIRE - PHQ9
1. LITTLE INTEREST OR PLEASURE IN DOING THINGS: 0
SUM OF ALL RESPONSES TO PHQ QUESTIONS 1-9: 0
2. FEELING DOWN, DEPRESSED OR HOPELESS: 0
SUM OF ALL RESPONSES TO PHQ QUESTIONS 1-9: 0
SUM OF ALL RESPONSES TO PHQ9 QUESTIONS 1 & 2: 0

## 2023-04-03 ASSESSMENT — ENCOUNTER SYMPTOMS
TROUBLE SWALLOWING: 0
EYE PAIN: 0
EYE DISCHARGE: 0
SHORTNESS OF BREATH: 0
BLOOD IN STOOL: 0
DIARRHEA: 0
WHEEZING: 0
COUGH: 0
COLOR CHANGE: 0
ABDOMINAL DISTENTION: 0

## 2023-04-03 NOTE — PROGRESS NOTES
Visit Information    Have you changed or started any medications since your last visit including any over-the-counter medicines, vitamins, or herbal medicines? no   Are you having any side effects from any of your medications? -  no  Have you stopped taking any of your medications? Is so, why? -  no    Have you seen any other physician or provider since your last visit? No  Have you had any other diagnostic tests since your last visit? No  Have you been seen in the emergency room and/or had an admission to a hospital since we last saw you? No  Have you had your routine dental cleaning in the past 6 months? no    Have you activated your [facility] account? If not, what are your barriers?  Yes     Patient Care Team:  Indu Malone MD as PCP - General (Internal Medicine)  Indu Malone MD as PCP - Empaneled Provider    Medical History Review  Past Medical, Family, and Social History reviewed and does contribute to the patient presenting condition    Health Maintenance   Topic Date Due    HIV screen  Never done    Hepatitis C screen  Never done    DTaP/Tdap/Td vaccine (1 - Tdap) Never done    Colorectal Cancer Screen  Never done    COVID-19 Vaccine (3 - Booster for Moderna series) 04/09/2021    Depression Screen  07/19/2022    Flu vaccine (Season Ended) 08/01/2023    Lipids  01/24/2025    Cervical cancer screen  07/19/2026    Hepatitis A vaccine  Aged Out    Hib vaccine  Aged Out    Meningococcal (ACWY) vaccine  Aged Out    Pneumococcal 0-64 years Vaccine  Aged Out    Diabetes screen  Discontinued
Harish Kerr MD on 4/3/2023 at 3:06 PM

## 2023-05-02 ENCOUNTER — OFFICE VISIT (OUTPATIENT)
Dept: INTERNAL MEDICINE CLINIC | Age: 48
End: 2023-05-02
Payer: COMMERCIAL

## 2023-05-02 VITALS
SYSTOLIC BLOOD PRESSURE: 124 MMHG | DIASTOLIC BLOOD PRESSURE: 76 MMHG | BODY MASS INDEX: 40.85 KG/M2 | HEIGHT: 62 IN | HEART RATE: 64 BPM | OXYGEN SATURATION: 98 % | WEIGHT: 222 LBS

## 2023-05-02 DIAGNOSIS — E66.01 MORBIDLY OBESE (HCC): Primary | ICD-10-CM

## 2023-05-02 DIAGNOSIS — F33.41 RECURRENT MAJOR DEPRESSIVE DISORDER, IN PARTIAL REMISSION (HCC): ICD-10-CM

## 2023-05-02 PROCEDURE — 99213 OFFICE O/P EST LOW 20 MIN: CPT | Performed by: INTERNAL MEDICINE

## 2023-05-02 RX ORDER — TOPIRAMATE 50 MG/1
50 TABLET, FILM COATED ORAL 2 TIMES DAILY
Qty: 180 TABLET | Refills: 3 | Status: SHIPPED | OUTPATIENT
Start: 2023-05-02

## 2023-05-02 RX ORDER — PHENTERMINE HYDROCHLORIDE 15 MG/1
15 CAPSULE ORAL EVERY MORNING
Qty: 30 CAPSULE | Refills: 0 | Status: SHIPPED | OUTPATIENT
Start: 2023-05-02 | End: 2023-06-01

## 2023-05-02 ASSESSMENT — ENCOUNTER SYMPTOMS
EYE PAIN: 0
TROUBLE SWALLOWING: 0
COLOR CHANGE: 0
SHORTNESS OF BREATH: 0
WHEEZING: 0
COUGH: 0
DIARRHEA: 0
BLOOD IN STOOL: 0
EYE DISCHARGE: 0
ABDOMINAL DISTENTION: 0

## 2023-05-02 NOTE — PROGRESS NOTES
Visit Information    Have you changed or started any medications since your last visit including any over-the-counter medicines, vitamins, or herbal medicines? no   Are you having any side effects from any of your medications? -  no  Have you stopped taking any of your medications? Is so, why? -  no    Have you seen any other physician or provider since your last visit? No  Have you had any other diagnostic tests since your last visit? No  Have you been seen in the emergency room and/or had an admission to a hospital since we last saw you? No  Have you had your routine dental cleaning in the past 6 months? no    Have you activated your [facility] account? If not, what are your barriers?  Yes     Patient Care Team:  Erica Gutierrez MD as PCP - General (Internal Medicine)  Erica Gutirerez MD as PCP - Empaneled Provider    Medical History Review  Past Medical, Family, and Social History reviewed and does contribute to the patient presenting condition    Health Maintenance   Topic Date Due    HIV screen  Never done    Hepatitis C screen  Never done    DTaP/Tdap/Td vaccine (1 - Tdap) Never done    Colorectal Cancer Screen  Never done    COVID-19 Vaccine (3 - Booster for [employer] series) 04/09/2021    Diabetes screen  01/24/2023    Flu vaccine (Season Ended) 08/01/2023    Depression Monitoring  04/03/2024    Lipids  01/24/2025    Cervical cancer screen  07/19/2026    Hepatitis A vaccine  Aged Out    Hib vaccine  Aged Out    Meningococcal (ACWY) vaccine  Aged Out    Pneumococcal 0-64 years Vaccine  Aged Out    Depression Screen  Discontinued
myalgias and neck pain. Skin:  Negative for color change and rash. Allergic/Immunologic: Negative for environmental allergies and food allergies. Neurological:  Negative for dizziness and headaches. Hematological:  Negative for adenopathy. Does not bruise/bleed easily. Psychiatric/Behavioral:  Negative for behavioral problems and sleep disturbance. Objective:     Physical Exam  Constitutional:       Appearance: She is well-developed. She is not diaphoretic. HENT:      Head: Normocephalic and atraumatic. Eyes:      General:         Right eye: No discharge. Left eye: No discharge. Extraocular Movements:      Right eye: Normal extraocular motion. Left eye: Normal extraocular motion. Conjunctiva/sclera: Conjunctivae normal.      Right eye: Right conjunctiva is not injected. Left eye: Left conjunctiva is not injected. Neck:      Thyroid: No thyroid mass or thyromegaly. Vascular: No JVD. Cardiovascular:      Rate and Rhythm: Normal rate and regular rhythm. Heart sounds: No murmur heard. No friction rub. Pulmonary:      Effort: Pulmonary effort is normal. No tachypnea, bradypnea, accessory muscle usage or respiratory distress. Breath sounds: Normal breath sounds. No wheezing or rales. Abdominal:      General: Bowel sounds are normal. There is no distension. Palpations: Abdomen is soft. Tenderness: There is no abdominal tenderness. There is no rebound. Musculoskeletal:         General: No tenderness. Normal range of motion. Cervical back: Normal range of motion and neck supple. No edema or erythema. Lymphadenopathy:      Head:      Right side of head: No submental or submandibular adenopathy. Left side of head: No submental or submandibular adenopathy. Cervical: No cervical adenopathy. Skin:     General: Skin is warm. Coloration: Skin is not pale. Findings: No bruising, ecchymosis or rash.    Neurological:

## 2023-05-18 DIAGNOSIS — Z12.11 COLON CANCER SCREENING: Primary | ICD-10-CM

## 2023-05-29 RX ORDER — BISACODYL 5 MG/1
TABLET, DELAYED RELEASE ORAL
Qty: 4 TABLET | Refills: 0 | Status: SHIPPED | OUTPATIENT
Start: 2023-05-29

## 2023-05-29 RX ORDER — POLYETHYLENE GLYCOL 3350 17 G/17G
POWDER, FOR SOLUTION ORAL
Qty: 238 G | Refills: 0 | Status: SHIPPED | OUTPATIENT
Start: 2023-05-29

## 2023-06-06 ENCOUNTER — HOSPITAL ENCOUNTER (OUTPATIENT)
Age: 48
Discharge: HOME | End: 2023-06-06
Payer: COMMERCIAL

## 2023-06-06 ENCOUNTER — OFFICE VISIT (OUTPATIENT)
Dept: INTERNAL MEDICINE CLINIC | Age: 48
End: 2023-06-06
Payer: COMMERCIAL

## 2023-06-06 VITALS
WEIGHT: 222 LBS | OXYGEN SATURATION: 98 % | DIASTOLIC BLOOD PRESSURE: 82 MMHG | BODY MASS INDEX: 40.6 KG/M2 | SYSTOLIC BLOOD PRESSURE: 130 MMHG | HEART RATE: 86 BPM

## 2023-06-06 DIAGNOSIS — E66.01 MORBIDLY OBESE (HCC): ICD-10-CM

## 2023-06-06 LAB
ALBUMIN SERPL-MCNC: 4 G/DL (ref 3.5–5.2)
ALP SERPL-CCNC: 66 U/L (ref 35–104)
ALT SERPL-CCNC: 18 U/L (ref 5–33)
ANION GAP SERPL CALCULATED.3IONS-SCNC: 6 MMOL/L (ref 9–17)
AST SERPL-CCNC: 18 U/L
BASOPHILS # BLD: 0.1 K/UL (ref 0–0.2)
BASOPHILS NFR BLD: 1 % (ref 0–2)
BILIRUB SERPL-MCNC: 0.4 MG/DL (ref 0.3–1.2)
BUN SERPL-MCNC: 9 MG/DL (ref 6–20)
CALCIUM SERPL-MCNC: 9.1 MG/DL (ref 8.6–10.4)
CHLORIDE SERPL-SCNC: 107 MMOL/L (ref 98–107)
CHOLEST SERPL-MCNC: 158 MG/DL
CHOLESTEROL/HDL RATIO: 4.3
CO2 SERPL-SCNC: 23 MMOL/L (ref 20–31)
CREAT SERPL-MCNC: 0.72 MG/DL (ref 0.5–0.9)
EOSINOPHIL # BLD: 0.1 K/UL (ref 0–0.4)
EOSINOPHILS RELATIVE PERCENT: 2 % (ref 0–4)
ERYTHROCYTE [DISTWIDTH] IN BLOOD BY AUTOMATED COUNT: 13.5 % (ref 11.5–14.9)
GFR SERPL CREATININE-BSD FRML MDRD: >60 ML/MIN/1.73M2
GLUCOSE SERPL-MCNC: 114 MG/DL (ref 70–99)
HCT VFR BLD AUTO: 40.9 % (ref 36–46)
HDLC SERPL-MCNC: 37 MG/DL
HGB BLD-MCNC: 13.8 G/DL (ref 12–16)
LDLC SERPL CALC-MCNC: 87 MG/DL (ref 0–130)
LYMPHOCYTES # BLD: 27 % (ref 24–44)
LYMPHOCYTES NFR BLD: 1.8 K/UL (ref 1–4.8)
MCH RBC QN AUTO: 30.2 PG (ref 26–34)
MCHC RBC AUTO-ENTMCNC: 33.8 G/DL (ref 31–37)
MCV RBC AUTO: 89.5 FL (ref 80–100)
MONOCYTES NFR BLD: 0.6 K/UL (ref 0.1–1.3)
MONOCYTES NFR BLD: 9 % (ref 1–7)
NEUTROPHILS NFR BLD: 61 % (ref 36–66)
NEUTS SEG NFR BLD: 4.1 K/UL (ref 1.3–9.1)
PLATELET # BLD AUTO: 327 K/UL (ref 150–450)
PMV BLD AUTO: 7.6 FL (ref 6–12)
POTASSIUM SERPL-SCNC: 4.5 MMOL/L (ref 3.7–5.3)
PROT SERPL-MCNC: 6.6 G/DL (ref 6.4–8.3)
RBC # BLD AUTO: 4.57 M/UL (ref 4–5.2)
SODIUM SERPL-SCNC: 136 MMOL/L (ref 135–144)
TRIGL SERPL-MCNC: 168 MG/DL
WBC OTHER # BLD: 6.7 K/UL (ref 3.5–11)

## 2023-06-06 PROCEDURE — 80061 LIPID PANEL: CPT

## 2023-06-06 PROCEDURE — 80053 COMPREHEN METABOLIC PANEL: CPT

## 2023-06-06 PROCEDURE — 36415 COLL VENOUS BLD VENIPUNCTURE: CPT

## 2023-06-06 PROCEDURE — 99213 OFFICE O/P EST LOW 20 MIN: CPT | Performed by: INTERNAL MEDICINE

## 2023-06-06 PROCEDURE — 85027 COMPLETE CBC AUTOMATED: CPT

## 2023-06-06 RX ORDER — PHENTERMINE HYDROCHLORIDE 15 MG/1
15 CAPSULE ORAL EVERY MORNING
Qty: 30 CAPSULE | Refills: 0 | Status: SHIPPED | OUTPATIENT
Start: 2023-06-06 | End: 2023-07-06

## 2023-06-06 NOTE — PROGRESS NOTES
[address] [address]  Dept: [phone]  Dept Fax: [phone]    Jennifer Gautam is a 50 y.o. female who presents today for her medicalconditions/complaints as noted below. Jennifer Gautam is c/o of Medication Refill (adipex)      HPI:     Weight Management  This is a chronic problem. The current episode started more than 1 year ago. The problem has been gradually improving. Nothing aggravates the symptoms. She has tried oral narcotics (on adipex) for the symptoms. Past Medical History:   Diagnosis Date    Anxiety 5/3/2013    NO MED STOPPED ZOLOFT DID NOT LIKE SIDE EFFECTS TO FOLLOW WITH DR FOR ALTERNATE MED    Lumbar disc disease     Obesity (BMI 30-39.9) 1/22/2020    Rotator cuff tear 01/19/2013    TO HAVE SURGERY 07/2013, TEAR HAPPENED WHEN BENCH PRESSING WEIGHTS        Current Outpatient Medications   Medication Sig Dispense Refill    phentermine 15 MG capsule Take 1 capsule by mouth every morning for 30 days. Max Daily Amount: 15 mg 30 capsule 0    polyethylene glycol (GLYCOLAX) 17 GM/SCOOP powder Please follow instructions provided to you by your provider. 238 g 0    bisacodyl 5 MG EC tablet Please follow instructions given to you by your provider. 4 tablet 0    topiramate (TOPAMAX) 50 MG tablet Take 1 tablet by mouth 2 times daily 180 tablet 3     No current facility-administered medications for this visit.      Allergies   Allergen Reactions    Bactrim [Sulfamethoxazole-Trimethoprim]      anaphalyaxis    Ibuprofen Itching    Other      Ocean perch  What ever they put on it to preserve it        Health Maintenance   Topic Date Due    HIV screen  Never done    Hepatitis C screen  Never done    DTaP/Tdap/Td vaccine (1 - Tdap) Never done    Colorectal Cancer Screen  Never done    COVID-19 Vaccine (3 - Booster for Moderna series) 04/09/2021    Diabetes screen  01/24/2023    Flu vaccine (Season Ended) 08/01/2023    Depression Monitoring  04/03/2024

## 2023-06-07 NOTE — PRE-PROCEDURE INSTRUCTIONS
No answer, left message ? Unable to leave message ? When were you told to arrive at hospital ?  -0700    Do you have a  ?-YES    Are you on any blood thinners ? -NONE                    If yes when did you stop taking ? Do you have your prep Rx filled and instruction ?  -YES    Nothing to eat the day before , only clear liquids. -INSTRUCTED    Are you experiencing any covid symptoms ? -NONE    Do you have any infections or rash we should be aware of ?-NONE      Do you have the Hibiclens soap to use the night before and the morning of surgery ?-N/A    Nothing to eat or drink after midnight, only a sip of water to take any medication instructed to take the night before. -INSTRUCTED    Wear comfortable clothing, leave any valuables at home, remove any jewelry and body piercing . -INSTRUCTED

## 2023-06-08 ENCOUNTER — ANESTHESIA EVENT (OUTPATIENT)
Dept: ENDOSCOPY | Age: 48
End: 2023-06-08
Payer: COMMERCIAL

## 2023-06-09 ENCOUNTER — ANESTHESIA (OUTPATIENT)
Dept: ENDOSCOPY | Age: 48
End: 2023-06-09
Payer: COMMERCIAL

## 2023-06-09 ENCOUNTER — HOSPITAL ENCOUNTER (OUTPATIENT)
Age: 48
Setting detail: OUTPATIENT SURGERY
Discharge: HOME | End: 2023-06-09
Attending: INTERNAL MEDICINE | Admitting: INTERNAL MEDICINE
Payer: COMMERCIAL

## 2023-06-09 VITALS
BODY MASS INDEX: 40.85 KG/M2 | RESPIRATION RATE: 12 BRPM | HEIGHT: 62 IN | DIASTOLIC BLOOD PRESSURE: 99 MMHG | OXYGEN SATURATION: 100 % | TEMPERATURE: 97.1 F | SYSTOLIC BLOOD PRESSURE: 155 MMHG | HEART RATE: 59 BPM | WEIGHT: 222 LBS

## 2023-06-09 DIAGNOSIS — Z12.11 SCREEN FOR COLON CANCER: ICD-10-CM

## 2023-06-09 LAB — HCG, PREGNANCY URINE (POC): NEGATIVE

## 2023-06-09 PROCEDURE — 2709999900 HC NON-CHARGEABLE SUPPLY: Performed by: INTERNAL MEDICINE

## 2023-06-09 PROCEDURE — 2500000003 HC RX 250 WO HCPCS: Performed by: ANESTHESIOLOGY

## 2023-06-09 PROCEDURE — 6360000002 HC RX W HCPCS: Performed by: ANESTHESIOLOGY

## 2023-06-09 PROCEDURE — 7100000011 HC PHASE II RECOVERY - ADDTL 15 MIN: Performed by: INTERNAL MEDICINE

## 2023-06-09 PROCEDURE — 3700000000 HC ANESTHESIA ATTENDED CARE: Performed by: INTERNAL MEDICINE

## 2023-06-09 PROCEDURE — 81025 URINE PREGNANCY TEST: CPT

## 2023-06-09 PROCEDURE — 7100000010 HC PHASE II RECOVERY - FIRST 15 MIN: Performed by: INTERNAL MEDICINE

## 2023-06-09 PROCEDURE — 2580000003 HC RX 258: Performed by: ANESTHESIOLOGY

## 2023-06-09 PROCEDURE — 3609010600 HC COLONOSCOPY POLYPECTOMY SNARE/COLD BIOPSY: Performed by: INTERNAL MEDICINE

## 2023-06-09 PROCEDURE — 3700000001 HC ADD 15 MINUTES (ANESTHESIA): Performed by: INTERNAL MEDICINE

## 2023-06-09 RX ORDER — LIDOCAINE HYDROCHLORIDE 10 MG/ML
1 INJECTION, SOLUTION EPIDURAL; INFILTRATION; INTRACAUDAL; PERINEURAL
Status: COMPLETED | OUTPATIENT
Start: 2023-06-09 | End: 2023-06-09

## 2023-06-09 RX ORDER — LIDOCAINE HYDROCHLORIDE 20 MG/ML
INJECTION, SOLUTION EPIDURAL; INFILTRATION; INTRACAUDAL; PERINEURAL PRN
Status: DISCONTINUED | OUTPATIENT
Start: 2023-06-09 | End: 2023-06-09 | Stop reason: SDUPTHER

## 2023-06-09 RX ORDER — SODIUM CHLORIDE 9 MG/ML
INJECTION, SOLUTION INTRAVENOUS PRN
Status: DISCONTINUED | OUTPATIENT
Start: 2023-06-09 | End: 2023-06-09 | Stop reason: HOSPADM

## 2023-06-09 RX ORDER — PROPOFOL 10 MG/ML
INJECTION, EMULSION INTRAVENOUS PRN
Status: DISCONTINUED | OUTPATIENT
Start: 2023-06-09 | End: 2023-06-09 | Stop reason: SDUPTHER

## 2023-06-09 RX ORDER — SODIUM CHLORIDE, SODIUM LACTATE, POTASSIUM CHLORIDE, CALCIUM CHLORIDE 600; 310; 30; 20 MG/100ML; MG/100ML; MG/100ML; MG/100ML
INJECTION, SOLUTION INTRAVENOUS CONTINUOUS PRN
Status: DISCONTINUED | OUTPATIENT
Start: 2023-06-09 | End: 2023-06-09 | Stop reason: SDUPTHER

## 2023-06-09 RX ORDER — SODIUM CHLORIDE 0.9 % (FLUSH) 0.9 %
5-40 SYRINGE (ML) INJECTION EVERY 12 HOURS SCHEDULED
Status: DISCONTINUED | OUTPATIENT
Start: 2023-06-09 | End: 2023-06-09 | Stop reason: HOSPADM

## 2023-06-09 RX ORDER — SODIUM CHLORIDE, SODIUM LACTATE, POTASSIUM CHLORIDE, CALCIUM CHLORIDE 600; 310; 30; 20 MG/100ML; MG/100ML; MG/100ML; MG/100ML
INJECTION, SOLUTION INTRAVENOUS CONTINUOUS
Status: DISCONTINUED | OUTPATIENT
Start: 2023-06-09 | End: 2023-06-09 | Stop reason: HOSPADM

## 2023-06-09 RX ORDER — SODIUM CHLORIDE 0.9 % (FLUSH) 0.9 %
5-40 SYRINGE (ML) INJECTION PRN
Status: DISCONTINUED | OUTPATIENT
Start: 2023-06-09 | End: 2023-06-09 | Stop reason: HOSPADM

## 2023-06-09 RX ADMIN — LIDOCAINE HYDROCHLORIDE 1 ML: 10 INJECTION, SOLUTION EPIDURAL; INFILTRATION; INTRACAUDAL; PERINEURAL at 08:28

## 2023-06-09 RX ADMIN — PROPOFOL 400 MG: 10 INJECTION, EMULSION INTRAVENOUS at 09:04

## 2023-06-09 RX ADMIN — LIDOCAINE HYDROCHLORIDE 40 MG: 20 INJECTION, SOLUTION EPIDURAL; INFILTRATION; INTRACAUDAL; PERINEURAL at 09:04

## 2023-06-09 RX ADMIN — SODIUM CHLORIDE, POTASSIUM CHLORIDE, SODIUM LACTATE AND CALCIUM CHLORIDE: 600; 310; 30; 20 INJECTION, SOLUTION INTRAVENOUS at 08:29

## 2023-06-09 RX ADMIN — SODIUM CHLORIDE, POTASSIUM CHLORIDE, SODIUM LACTATE AND CALCIUM CHLORIDE: 600; 310; 30; 20 INJECTION, SOLUTION INTRAVENOUS at 09:02

## 2023-06-09 ASSESSMENT — PAIN SCALES - GENERAL: PAINLEVEL_OUTOF10: 0

## 2023-06-09 ASSESSMENT — PAIN DESCRIPTION - DESCRIPTORS: DESCRIPTORS: CRAMPING

## 2023-06-09 ASSESSMENT — PAIN - FUNCTIONAL ASSESSMENT: PAIN_FUNCTIONAL_ASSESSMENT: 0-10

## 2023-06-09 NOTE — OP NOTE
COLONOSCOPY    DATE OF PROCEDURE: 6/9/2023    SURGEON: Sarahi Maldonado MD    ASSISTANT: None    PREOPERATIVE DIAGNOSIS: Screening colonoscopy    POSTOPERATIVE DIAGNOSIS: Diverticulosis, polyp sigmoid colon, polyp left colon    OPERATION: Total colonoscopy, snare polypectomy x2    ANESTHESIA: MAC    ESTIMATED BLOOD LOSS: None    COMPLICATIONS: None     SPECIMENS:  Was Obtained: Polyp sigmoid colon, polyp left colon    HISTORY: The patient is a 50y.o. year old female with history of above preop diagnosis. I recommended colonoscopy with possible biopsy or polypectomy and I explained the risk, benefits, expected outcome, and alternatives to the procedure. Risks included but are not limited to bleeding, infection, respiratory distress, hypotension, and perforation of the colon and possibility of missing a lesion. The patient understands and is in agreement. PROCEDURE:  The patient's SPO2 remained above 90% throughout the procedure. Digital rectal exam was normal.  The colonoscope was inserted through the anus into the rectum and advanced under direct vision to the cecum without difficulty. Terminal ileum was examined for approximately 2 inches. The prep was excellent. Findings:  Terminal ileum: normal    Cecum/Ascending colon: normal, also examined in the retroflexed view    Transverse colon: normal    Descending/Sigmoid colon: abnormal: In the left colon at about 70 cm from the anus there is a polyp which is  4 to 5 mm, removed with cold snare    In the sigmoid colon, at about 40 cm from the anus, a polyp which is about 7 to 10 mm seen, removed with snare and cautery technique    Has  diverticulosis in the sigmoid colon    Rectum/Anus: examined in normal and retroflexed positions and was normal    Withdrawal Time was (minutes): 15          The colon was decompressed and the scope was removed. The patient tolerated the procedure without unusual events.      During the procedure, the patient's blood

## 2023-06-09 NOTE — H&P
HISTORY and [address]       NAME:  William Alaniz  MRN: [id number]   YOB: 1975   Date: 6/9/2023   Age: 50 y.o. Gender: female       COMPLAINT AND PRESENT HISTORY:       William Alaniz is 50 y.o.,   female, having a Screening Colonoscopy. Patient is being seen for Pre-Op Diagnosis Codes:     * Screen for colon cancer      Prior Colonoscopy was done. Denies abdominal pain including bloating/gas. No changes in bowel habits. No diarrhea, constipation, blood in stools, black tarry stools. No changes in appetite and unintended weight loss. Denies any nausea or vomiting. No  heartburn, indigestion or acid reflux. Pt is not  taking anything. Denies Family Hx of colon cancer. No significant medical conditions:  anxiety    Patient voices feeling well today. Denies any recent fever or chills, chest pain or SOB. Patient denies any personal hx of blood clots    Functional Capacity per patient:              1. Patient is able to walk 2 city blocks on level ground without SOB. 2. Patient is able to climb 2 flights of stairs without SOB. Any anticoagulants or blood thinners: no    Patient has been NPO since midnight. Patient states has taken all bowel prep with clear outcome.      Patient denies any personal or family problems with anesthesia       PAST MEDICAL HISTORY     Past Medical History:   Diagnosis Date    Anxiety 5/3/2013    NO MED STOPPED ZOLOFT DID NOT LIKE SIDE EFFECTS TO FOLLOW WITH DR FOR ALTERNATE MED    Lumbar disc disease     Obesity (BMI 30-39.9) 1/22/2020    Rotator cuff tear 01/19/2013    TO HAVE SURGERY 07/2013, TEAR HAPPENED WHEN BENCH PRESSING WEIGHTS       SURGICAL HISTORY       Past Surgical History:   Procedure Laterality Date    LUMBAR DISC SURGERY      L5 herniated disc    SHOULDER ARTHROSCOPY Right 2/5/2020    SHOULDER ARTHROSCOPY ROTATOR CUFF REPAIR, W/INTERSCALENE BLOCK & EXPAREL performed by [facility],

## 2023-06-09 NOTE — ANESTHESIA POSTPROCEDURE EVALUATION
Department of Anesthesiology  Postprocedure Note    Patient: Donnie Torrse  MRN: [id number]  YOB: 1975  Date of evaluation: 6/9/2023      Procedure Summary     Date: 06/09/23 Room / Location: [address]    Anesthesia Start: 0902 Anesthesia Stop: 7860    Procedure: COLONOSCOPY POLYPECTOMY SNARE/HOT BIOPSY Diagnosis:       Screen for colon cancer      (Screen for colon cancer [Z12.11])    Surgeons: Graeme Osborne MD Responsible Provider: Adrian Coronado MD    Anesthesia Type: general ASA Status: 2          Anesthesia Type: No value filed.     Marian Phase I:      Marian Phase II: Marian Score: 9      Anesthesia Post Evaluation    Comments: POST- ANESTHESIA EVALUATION       Pt Name: Donnie Torres  MRN: [id number]  YOB: 1975  Date of evaluation: 6/9/2023  Time:  10:27 AM      /73   Pulse 59   Temp 97.1 °F (36.2 °C) (Infrared)   Resp 12   Ht 5' 2\" (1.575 m)   Wt 222 lb (100.7 kg)   SpO2 100%   BMI 40.60 kg/m²      Consciousness Level  Awake  Cardiopulmonary Status  Stable  Pain Adequately Treated YES  Nausea / Vomiting  NO  Adequate Hydration  YES  Anesthesia Related Complications NONE      Electronically signed by Adrian Coronado MD on 6/9/2023 at 10:27 AM

## 2023-06-09 NOTE — DISCHARGE INSTRUCTIONS
intake, eat the peels of fruits and vegetablesjust be sure to wash them well first.   Fruits   All fruits, especially apples, berries, grapefruits, mangoes, nectarines, oranges, peaches, pears, dried fruits (figs, dates, prunes, raisins)   Choose raw fruits and vegetables over juice, cooked, or cannedraw fruit has more fiber. Dried fruit is also a good source of fiber. Milk   With the exception of yogurt containing inulin (a type of fiber), dairy foods provide little fiber. Add more fiber by topping your yogurt or cottage cheese with fresh fruit, whole grain or bran cereals, nuts, or seeds. Meats and Beans   All beans and peas, especially Garbanzo beans, kidney beans, lentils, lima beans, split peas, and logan beans All nuts and seeds, especially almonds, peanuts, [place] nuts, cashews, peanut butter, walnuts, sesame and sunflower seeds All meat, poultry, fish, and eggs   Increase fiber in meat dishes by adding logan beans, kidney beans, black-eyed peas, bran, or oatmeal. If you are following a low-fat diet, use nuts and seeds only in moderation. Fats and Oils   All in moderation   Fats and oils do not provide fiber   Snacks, Sweets, and Condiments   Fruit Nuts Popcorn, whole-wheat pretzels, or trail mix made with dried fruits, nuts, and seeds Cakes, breads, and cookies made with oatmeal or whole-wheat flour   Most snack foods do not provide much fiber. Choose snacks with at least 2 grams of fiber per serving.      Last Reviewed: March 2011 Mervin Brooks MS, MPH, RD   Updated: 3/29/2011

## 2023-06-09 NOTE — ANESTHESIA PRE PROCEDURE
05/30/23 222 lb (100.7 kg)     Body mass index is 40.6 kg/m². CBC:   Lab Results   Component Value Date/Time    WBC 6.7 06/06/2023 12:31 PM    RBC 4.57 06/06/2023 12:31 PM    HGB 13.8 06/06/2023 12:31 PM    HCT 40.9 06/06/2023 12:31 PM    MCV 89.5 06/06/2023 12:31 PM    RDW 13.5 06/06/2023 12:31 PM     06/06/2023 12:31 PM       CMP:   Lab Results   Component Value Date/Time     06/06/2023 12:31 PM    K 4.5 06/06/2023 12:31 PM     06/06/2023 12:31 PM    CO2 23 06/06/2023 12:31 PM    BUN 9 06/06/2023 12:31 PM    CREATININE 0.72 06/06/2023 12:31 PM    GFRAA >60 01/24/2020 11:05 AM    LABGLOM >60 06/06/2023 12:31 PM    GLUCOSE 114 06/06/2023 12:31 PM    PROT 6.6 06/06/2023 12:31 PM    CALCIUM 9.1 06/06/2023 12:31 PM    BILITOT 0.4 06/06/2023 12:31 PM    ALKPHOS 66 06/06/2023 12:31 PM    AST 18 06/06/2023 12:31 PM    ALT 18 06/06/2023 12:31 PM       POC Tests: No results for input(s): POCGLU, POCNA, POCK, POCCL, POCBUN, POCHEMO, POCHCT in the last 72 hours.     Coags: No results found for: PROTIME, INR, APTT    HCG (If Applicable):   Lab Results   Component Value Date    HCG NEGATIVE 02/05/2020        ABGs: No results found for: PHART, PO2ART, BKJ6FII, ORT6UHI, BEART, O0MCRCLC     Type & Screen (If Applicable):  No results found for: LABABO, LABRH    Drug/Infectious Status (If Applicable):  No results found for: HIV, HEPCAB    COVID-19 Screening (If Applicable): No results found for: COVID19        Anesthesia Evaluation  Patient summary reviewed and Nursing notes reviewed no history of anesthetic complications:   Airway: Mallampati: III  TM distance: >3 FB   Neck ROM: full  Mouth opening: > = 3 FB   Dental:          Pulmonary:Negative Pulmonary ROS and normal exam  breath sounds clear to auscultation                             Cardiovascular:Negative CV ROS  Exercise tolerance: good (>4 METS),           Rhythm: regular  Rate: normal                    Neuro/Psych:   (+) psychiatric history: stable

## 2023-07-10 ENCOUNTER — TELEPHONE (OUTPATIENT)
Dept: INTERNAL MEDICINE CLINIC | Age: 48
End: 2023-07-10

## 2023-08-23 DIAGNOSIS — M79.671 BILATERAL FOOT PAIN: Primary | ICD-10-CM

## 2023-08-23 DIAGNOSIS — M79.672 BILATERAL FOOT PAIN: Primary | ICD-10-CM

## 2023-08-28 DIAGNOSIS — M79.671 BILATERAL FOOT PAIN: Primary | ICD-10-CM

## 2023-08-28 DIAGNOSIS — M25.571 BILATERAL ANKLE PAIN, UNSPECIFIED CHRONICITY: ICD-10-CM

## 2023-08-28 DIAGNOSIS — M25.572 BILATERAL ANKLE PAIN, UNSPECIFIED CHRONICITY: ICD-10-CM

## 2023-08-28 DIAGNOSIS — M79.672 BILATERAL FOOT PAIN: Primary | ICD-10-CM

## 2023-08-29 ENCOUNTER — OFFICE VISIT (OUTPATIENT)
Dept: ORTHOPEDIC SURGERY | Age: 48
End: 2023-08-29
Payer: COMMERCIAL

## 2023-08-29 VITALS — BODY MASS INDEX: 40.6 KG/M2 | OXYGEN SATURATION: 100 % | RESPIRATION RATE: 16 BRPM | HEIGHT: 62 IN

## 2023-08-29 DIAGNOSIS — M76.62 ACHILLES TENDINITIS OF BOTH LOWER EXTREMITIES: ICD-10-CM

## 2023-08-29 DIAGNOSIS — M72.2 PLANTAR FASCIITIS, BILATERAL: Primary | ICD-10-CM

## 2023-08-29 DIAGNOSIS — M76.61 ACHILLES TENDINITIS OF BOTH LOWER EXTREMITIES: ICD-10-CM

## 2023-08-29 PROCEDURE — 99204 OFFICE O/P NEW MOD 45 MIN: CPT | Performed by: ORTHOPAEDIC SURGERY

## 2023-08-29 RX ORDER — NAPROXEN 500 MG/1
500 TABLET ORAL 2 TIMES DAILY PRN
Qty: 60 TABLET | Refills: 0 | Status: SHIPPED | OUTPATIENT
Start: 2023-08-29

## 2023-08-29 NOTE — PROGRESS NOTES
[address] [facility] SPORTS MEDICINE  [address] [address]  Dept: [phone]    Ambulatory Orthopedic Consult      CHIEF COMPLAINT:    Chief Complaint   Patient presents with    Foot Pain     Bilateral        HISTORY OF PRESENT ILLNESS:      The patient is a 46 y.o. female who is being seen for evaluation of the above, which began 8/17/2023 atraumatically. At today's visit, she is using no brace/assistive device. History is obtained today from:   [x]  the patient     [x]  EMR     []  one family member/friend    []  multiple family members/friends    []  other: At today's visit, the patient localizes pain to the left greater than right foot. On the left, her pain is localized diffusely throughout the plantar foot including the plantar heel, which is greater than pain on the right side, which is localized to the posterior Achilles tendon greater than the plantar foot. She reports pain with her first steps of the day. REVIEW OF SYSTEMS:  Musculoskeletal: See HPI for pertinent positives     Past Medical History:    She  has a past medical history of Anxiety (5/3/2013), Lumbar disc disease, Obesity (BMI 30-39.9) (1/22/2020), and Rotator cuff tear (01/19/2013). Past Surgical History:    She  has a past surgical history that includes Lumbar disc surgery; Shoulder arthroscopy (Right, 2/5/2020); Shoulder arthroscopy (Right, 2/5/2020); and Colonoscopy (N/A, 6/9/2023).      Current Medications:     Current Outpatient Medications:     polyethylene glycol (GLYCOLAX) 17 GM/SCOOP powder, Please follow instructions provided to you by your provider., Disp: 238 g, Rfl: 0    bisacodyl 5 MG EC tablet, Please follow instructions given to you by your provider., Disp: 4 tablet, Rfl: 0    topiramate (TOPAMAX) 50 MG tablet, Take 1 tablet by mouth 2 times daily, Disp: 180 tablet, Rfl: 3     Allergies:    Bactrim

## 2023-09-06 ENCOUNTER — HOSPITAL ENCOUNTER (OUTPATIENT)
Dept: PHYSICAL THERAPY | Facility: CLINIC | Age: 48
Setting detail: THERAPIES SERIES
Discharge: HOME | End: 2023-09-06
Payer: COMMERCIAL

## 2023-09-06 PROCEDURE — 97161 PT EVAL LOW COMPLEX 20 MIN: CPT

## 2023-09-06 PROCEDURE — 97110 THERAPEUTIC EXERCISES: CPT

## 2023-09-06 NOTE — CONSULTS
[] [facility]) [facility] &  Therapy  [address] [facility].  [phone]  F: [phone] [x] [address] [facility]  [address] [place] [address] Rd   [address]  P: [phone]  F: [phone] [] [address]  Therapy  [address]  P: [phone]  F: [phone] [] [facility]  P: [phone]  F: [phone] [] [address]   [address]   [place]: [phone]  F: [phone]    Physical Therapy Lower Extremity Evaluation    Date:  2023  Patient: Umberto Shepherd  : 1975  MRN: [id number]  Physician: Serenity Parnell MD    Insurance: [employer] (30v/)  Medical Diagnosis: M76.61, M76.62 (ICD-10-CM) - Achilles tendinitis of both lower extremities; M72.2 (ICD-10-CM) - Plantar fasciitis, bilateral  Rehab Codes: M76.61, M76.62, M72.2, M62.81, R26.89  Onset date: 8/15/23   Next 's appt.: 10/09/23    Subjective:   CC/HPI: 46 y.o. female presents to physical therapy with chronic bilateral foot and R ankle pain. Pain in bilateral feet since January. Pt describes feeling in august that pain progressively increased. Pain inreases most in the morning, when walking prolonged, standing from sitting prolonged. Pt reports after 8/15 pain increased significantly. Has had to ice and rest, could hardly stand or walk due to pain. Pt reports that she continued to go to work despite pain but had significant difficulty, had to ice at work and leave at 1 pm. Pt reports that after resting and reducing activity. Saw Dr Odell Gaston on . Pt received an xray and splint to sleep in on L. Received insoles and wedges for L. Pain through achilles on L, primarily plantar fascia bilaterally. Pt was prescribed naproxen but has not taken it yet.  Will take ibuprofen

## 2023-09-07 ENCOUNTER — HOSPITAL ENCOUNTER (OUTPATIENT)
Dept: PHYSICAL THERAPY | Facility: CLINIC | Age: 48
Setting detail: THERAPIES SERIES
Discharge: HOME | End: 2023-09-07
Payer: COMMERCIAL

## 2023-09-07 NOTE — FLOWSHEET NOTE
[] [address]  [address] [facility].    [phone]  F: [phone]   [x] [address]  [address]  P: [phone]  F: [phone]  [] [address]  [address] [facility]  P: [phone]  F: [phone] [] Tommy Yang: [phone]  F: [phone]  [] [address] [facility]  [address] [facility]   [address]   [place]: [phone]  F: [phone]   [] [address] [facility]  [address]  [place]: [phone]   F: [phone]     Physical Therapy Cancel/No Show note    Date: 2023  Patient: Ginny Chong  : 1975  MRN: [id number]    Cancels/No Shows to date:     For today's appointment patient:    [x]  Cancelled    [] Rescheduled appointment    [] No-show     Reason given by patient:    [x]  Patient ill    []  Conflicting appointment    [] No transportation      [] Conflict with work    [] No reason given    [] Weather related    [] [id number]    [] Other:      Comments:        [x] Next appointment was confirmed    Electronically signed by: Anny Sargent PTA

## 2023-09-14 ENCOUNTER — TELEPHONE (OUTPATIENT)
Dept: ORTHOPEDIC SURGERY | Age: 48
End: 2023-09-14

## 2023-09-14 NOTE — TELEPHONE ENCOUNTER
Left VM for patient to call me back. I need to speak with her about her [facility] paperwork and the dates. Please inform me when she calls so I can speak with her.

## 2023-10-09 ENCOUNTER — OFFICE VISIT (OUTPATIENT)
Dept: ORTHOPEDIC SURGERY | Age: 48
End: 2023-10-09
Payer: COMMERCIAL

## 2023-10-09 VITALS — RESPIRATION RATE: 14 BRPM | HEIGHT: 62 IN | BODY MASS INDEX: 39.56 KG/M2 | WEIGHT: 215 LBS | OXYGEN SATURATION: 99 %

## 2023-10-09 DIAGNOSIS — M76.62 ACHILLES TENDINITIS OF BOTH LOWER EXTREMITIES: ICD-10-CM

## 2023-10-09 DIAGNOSIS — M79.672 BILATERAL FOOT PAIN: ICD-10-CM

## 2023-10-09 DIAGNOSIS — M76.61 ACHILLES TENDINITIS OF BOTH LOWER EXTREMITIES: ICD-10-CM

## 2023-10-09 DIAGNOSIS — M79.671 BILATERAL FOOT PAIN: ICD-10-CM

## 2023-10-09 DIAGNOSIS — M72.2 PLANTAR FASCIITIS, BILATERAL: Primary | ICD-10-CM

## 2023-10-09 PROCEDURE — 99212 OFFICE O/P EST SF 10 MIN: CPT | Performed by: ORTHOPAEDIC SURGERY

## 2024-01-04 ENCOUNTER — OFFICE VISIT (OUTPATIENT)
Dept: ORTHOPEDIC SURGERY | Age: 49
End: 2024-01-04
Payer: COMMERCIAL

## 2024-01-04 VITALS — HEIGHT: 62 IN | BODY MASS INDEX: 39.56 KG/M2 | RESPIRATION RATE: 14 BRPM | WEIGHT: 215 LBS

## 2024-01-04 DIAGNOSIS — M75.102 TEAR OF LEFT ROTATOR CUFF, UNSPECIFIED TEAR EXTENT, UNSPECIFIED WHETHER TRAUMATIC: Primary | ICD-10-CM

## 2024-01-04 DIAGNOSIS — M25.512 LEFT SHOULDER PAIN, UNSPECIFIED CHRONICITY: ICD-10-CM

## 2024-01-04 PROCEDURE — 99213 OFFICE O/P EST LOW 20 MIN: CPT | Performed by: ORTHOPAEDIC SURGERY

## 2024-01-04 RX ORDER — DICLOFENAC SODIUM 75 MG/1
75 TABLET, DELAYED RELEASE ORAL 2 TIMES DAILY WITH MEALS
Qty: 28 TABLET | Refills: 0 | Status: SHIPPED | OUTPATIENT
Start: 2024-01-04 | End: 2024-01-18

## 2024-01-04 NOTE — PROGRESS NOTES
ORTHOPEDIC PATIENT EVALUATION      HPI / Chief Complaint  Annalisa Claire is a 48 y.o. female who presents for evaluation of her left shoulder.  She is well-known to me as she had a right shoulder rotator cuff tear and underwent an arthroscopic repair back on 2/5/2020.  She is done well on that side had no issues.  4 days ago on 12/31/2023 she indicates that she was swinging her niece and swung her upward over her shoulder and felt the painful crunch in her left shoulder with her arm immediately going numb.  She has been in significant pain ever since.  She has a hard time sleeping as a result of this.  She has a difficult time moving her arm without significant pain.  She indicates that back in 2013 she did hurt the same shoulder bench pressing.  She was diagnosed with a rotator cuff tear at the time but was treated conservatively.  She had been doing well up until this recent incident.    Past Medical History  Annalisa  has a past medical history of Anxiety, Lumbar disc disease, Obesity (BMI 30-39.9), and Rotator cuff tear.    Past Surgical History  Annalisa  has a past surgical history that includes Lumbar disc surgery; Shoulder arthroscopy (Right, 2/5/2020); Shoulder arthroscopy (Right, 2/5/2020); and Colonoscopy (N/A, 6/9/2023).    Current Medications  Current Outpatient Medications   Medication Sig Dispense Refill    naproxen (EC NAPROSYN) 500 MG EC tablet Take 1 tablet by mouth 2 times daily as needed for Pain (Patient not taking: Reported on 1/4/2024) 60 tablet 0    polyethylene glycol (GLYCOLAX) 17 GM/SCOOP powder Please follow instructions provided to you by your provider. (Patient not taking: Reported on 1/4/2024) 238 g 0    bisacodyl 5 MG EC tablet Please follow instructions given to you by your provider. (Patient not taking: Reported on 1/4/2024) 4 tablet 0    topiramate (TOPAMAX) 50 MG tablet Take 1 tablet by mouth 2 times daily (Patient not taking: Reported on 1/4/2024) 180 tablet 3     No current

## 2024-01-09 ENCOUNTER — HOSPITAL ENCOUNTER (OUTPATIENT)
Dept: PHYSICAL THERAPY | Facility: CLINIC | Age: 49
Setting detail: THERAPIES SERIES
Discharge: HOME | End: 2024-01-09
Payer: COMMERCIAL

## 2024-01-09 PROCEDURE — 97110 THERAPEUTIC EXERCISES: CPT

## 2024-01-09 PROCEDURE — 97161 PT EVAL LOW COMPLEX 20 MIN: CPT

## 2024-01-09 NOTE — CONSULTS
[] [facility]  Outpatient Rehabilitation &  Therapy  [address]  [phone]  F: [phone] [x] [facility]  Outpatient Rehabilitation &  Therapy  [address]   [address]  P: [phone]  F: [phone] [] [facility]  Outpatient Rehabilitation &  Therapy  [address]  P: [phone]  F: [phone] [] [facility]  Outpatient Rehabilitation &  Therapy  [address]  P: [phone]  F: [phone] [] [facility]  Outpatient Rehabilitation &  Therapy  [address]   P: [phone]  F: [phone]      Physical Therapy Upper Extremity Evaluation    Date:  2024  Patient: Annalisa Claire  : 1975  MRN: [id number]  Physician: Braxton Dinh MD    Insurance: [facility] (30v/armen year soft max; $0 copay)  Medical Diagnosis: M75.102 (ICD-10-CM) - Tear of left rotator cuff, unspecified tear extent, unspecified whether traumatic   Rehab Codes: M75.102, M62.81, R29.3, M25.42  Onset Date: 23     Next 's appt.: tbd    Subjective:   CC/HPI: 48 y.o. female presents with acute L shoulder pain.    Pt describes tearing her L RTC in  and treated conservatively for a while. Pain improved and shoulder became functional again. Pt then describes tossing her niece on new years lana and feeling a pop with immediate numbness through L arm. Pain was severe and pt was unable to sleep. Limited movement in her L arm. Pt saw Dr. Dinh on  and received an anti-inflammatory and notes improvement following this. Pain is gradually improving. Pt has been icing daily and using menthol for muscle soreness. Pt describes a lot of swelling through L hand that is now improving pt is able to make a fist again. Pt denies any numbness or tingling at this time. Has been performing pendulums. Describes crunching in arm, denies any further popping or instability.     Pertinent PMH includes R shoulder

## 2024-01-11 ENCOUNTER — HOSPITAL ENCOUNTER (OUTPATIENT)
Dept: PHYSICAL THERAPY | Facility: CLINIC | Age: 49
Setting detail: THERAPIES SERIES
Discharge: HOME | End: 2024-01-11
Payer: COMMERCIAL

## 2024-01-11 NOTE — FLOWSHEET NOTE
[] [facility]  Outpatient Rehabilitation &  Therapy  [address]  [phone]  F: [phone] [x] [facility]  Outpatient Rehabilitation &  Therapy  [address]   [address]  P: [phone]  F: [phone] [] [facility]  Outpatient Rehabilitation &  Therapy  [address]  P: [phone]  F: [phone] [] [facility]  Outpatient Rehabilitation &  Therapy  [address]  P: [phone]  F: [phone] [] [facility]  Outpatient Rehabilitation &  Therapy  [address]   P: [phone]  F: [phone]  [] [facility]  Outpatient Rehabilitation &  Therapy  [address]   P: [phone]  F: [phone] [] [facility]  Outpatient Rehabilitation &  Therapy  [address]  P: [phone]  F: [phone] [] [facility]  Outpatient Rehabilitation &  Therapy  [address] [address] [facility]  [address]  P: [phone]  F: [phone] [] [facility]  Outpatient Rehabilitation &  Therapy  [address]  P: [phone]  F: [phone]  [] [facility] Outpatient Rehabilitation &  Therapy  [address]  P: [phone]  F: [phone]     Therapy Cancel/No Show note    Date: 2024  Patient: Annalisa Claire  : 1975  MRN: [id number]    Cancels/No Shows to date:     For today's appointment patient:    [x]  Cancelled    [] Rescheduled appointment    [] No-show     Reason given by patient:    []  Patient ill    []  Conflicting appointment    [x] No transportation      [] Conflict with work    [] No reason given    [x] Weather related    [] COVID-19    [] Other:      Comments:        [x] Next appointment was confirmed    Electronically signed by: Ewdard Vega, PTA

## 2024-01-15 ENCOUNTER — HOSPITAL ENCOUNTER (OUTPATIENT)
Dept: PHYSICAL THERAPY | Facility: CLINIC | Age: 49
Setting detail: THERAPIES SERIES
Discharge: HOME | End: 2024-01-15
Payer: COMMERCIAL

## 2024-01-15 PROCEDURE — 97016 VASOPNEUMATIC DEVICE THERAPY: CPT

## 2024-01-15 PROCEDURE — 97110 THERAPEUTIC EXERCISES: CPT

## 2024-01-15 NOTE — FLOWSHEET NOTE
[] [facility]  Outpatient Rehabilitation &  Therapy  [address]  [phone]  F: [phone] [x] [facility]  Outpatient Rehabilitation &  Therapy  [address]  P: [phone]  F: [phone] [] [facility]  Outpatient Rehabilitation &  Therapy  [address]  P: [phone]  F: [phone] [] [facility]  Outpatient Rehabilitation &  Therapy  [address]  P: [phone]  F: [phone] [] [facility]  Outpatient Rehabilitation &  Therapy  [address]   P: [phone]  F: [phone]  [] [facility]  Outpatient Rehabilitation &  Therapy  [address]   P: [phone]  F: [phone] [] [facility]  Outpatient Rehabilitation &  Therapy  [address]  P: [phone]  F: [phone] [] [facility]  Outpatient Rehabilitation &  Therapy  [address]  P: [phone]  F: [phone] [] [facility]  Outpatient Rehabilitation &  Therapy  [address]  P: [phone]  F: [phone]      Physical Therapy Daily Treatment Note    Date:  1/15/2024  Patient Name:  Annalisa Claire    :  1975  MRN: [id number]  Physician: Braxton Dinh MD                              Insurance: [facility] (30v/armen year soft max; $0 copay)  Medical Diagnosis: M75.102 (ICD-10-CM) - Tear of left rotator cuff, unspecified tear extent, unspecified whether traumatic                       Rehab Codes: M75.102, M62.81, R29.3, M25.42  Onset Date: 23                                                 Next 's appt.: tbd  Visit# / total visits: 1/0   Cancels/No Shows: 1/0       Subjective:    Pain:  [x] Yes  [] No  Location: L shoulder   Pain Rating: (0-10 scale) 5-6/10  Pain altered Tx:  [x] No  [] Yes  Action:  Comments: Pt arrived to physical therapy with

## 2024-01-17 ENCOUNTER — HOSPITAL ENCOUNTER (OUTPATIENT)
Dept: PHYSICAL THERAPY | Facility: CLINIC | Age: 49
Setting detail: THERAPIES SERIES
Discharge: HOME | End: 2024-01-17
Payer: COMMERCIAL

## 2024-01-17 PROCEDURE — 97110 THERAPEUTIC EXERCISES: CPT

## 2024-01-17 PROCEDURE — 97016 VASOPNEUMATIC DEVICE THERAPY: CPT

## 2024-01-17 NOTE — FLOWSHEET NOTE
[] [facility]  Outpatient Rehabilitation &  Therapy  [address]  [phone]  F: [phone] [x] [facility]  Outpatient Rehabilitation &  Therapy  [address]  P: [phone]  F: [phone] [] [facility]  Outpatient Rehabilitation &  Therapy  [address]  P: [phone]  F: [phone] [] [facility]  Outpatient Rehabilitation &  Therapy  [address]  P: [phone]  F: [phone] [] [facility]  Outpatient Rehabilitation &  Therapy  [address]   P: [phone]  F: [phone]  [] [facility]  Outpatient Rehabilitation &  Therapy  [address]   P: [phone]  F: [phone] [] [facility]  Outpatient Rehabilitation &  Therapy  [address]  P: [phone]  F: [phone] [] [facility]  Outpatient Rehabilitation &  Therapy  [address]  P: [phone]  F: [phone] [] [facility]  Outpatient Rehabilitation &  Therapy  [address]  P: [phone]  F: [phone]      Physical Therapy Daily Treatment Note    Date:  2024  Patient Name:  Annalisa Claire    :  1975  MRN: [id number]  Physician: Braxton Dinh MD                              Insurance: [facility] (30v/armen year soft max; $0 copay)  Medical Diagnosis: M75.102 (ICD-10-CM) - Tear of left rotator cuff, unspecified tear extent, unspecified whether traumatic                       Rehab Codes: M75.102, M62.81, R29.3, M25.42  Onset Date: 23                                                 Next 's appt.: tbd  Visit# / total visits: 3/16   Cancels/No Shows: 1/0       Subjective:    Pain:  [x] Yes  [] No  Location: L shoulder   Pain Rating: (0-10 scale) 3-4/10  Pain altered Tx:  [x] No  [] Yes  Action:  Comments: Pt arrives noting that she feels

## 2024-01-19 ENCOUNTER — HOSPITAL ENCOUNTER (OUTPATIENT)
Dept: PHYSICAL THERAPY | Facility: CLINIC | Age: 49
Setting detail: THERAPIES SERIES
Discharge: HOME | End: 2024-01-19
Payer: COMMERCIAL

## 2024-01-19 PROCEDURE — 97016 VASOPNEUMATIC DEVICE THERAPY: CPT

## 2024-01-19 PROCEDURE — 97110 THERAPEUTIC EXERCISES: CPT

## 2024-01-19 NOTE — FLOWSHEET NOTE
[] [facility]  Outpatient Rehabilitation &  Therapy  [address]  [phone]  F: [phone] [x] [facility]  Outpatient Rehabilitation &  Therapy  [address]  P: [phone]  F: [phone] [] [facility]  Outpatient Rehabilitation &  Therapy  [address]  P: [phone]  F: [phone] [] [facility]  Outpatient Rehabilitation &  Therapy  [address]  P: [phone]  F: [phone] [] [facility]  Outpatient Rehabilitation &  Therapy  [address]   P: [phone]  F: [phone]  [] [facility]  Outpatient Rehabilitation &  Therapy  [address]   P: [phone]  F: [phone] [] [facility]  Outpatient Rehabilitation &  Therapy  [address]  P: [phone]  F: [phone] [] [facility]  Outpatient Rehabilitation &  Therapy  [address]  P: [phone]  F: [phone] [] [facility]  Outpatient Rehabilitation &  Therapy  [address]  P: [phone]  F: [phone]      Physical Therapy Daily Treatment Note    Date:  2024  Patient Name:  Annalisa Claire    :  1975  MRN: [id number]  Physician: Braxton Dinh MD                              Insurance: [facility] (30v/armen year soft max; $0 copay)  Medical Diagnosis: M75.102 (ICD-10-CM) - Tear of left rotator cuff, unspecified tear extent, unspecified whether traumatic                       Rehab Codes: M75.102, M62.81, R29.3, M25.42  Onset Date: 23                                                 Next 's appt.: tbd  Visit# / total visits: 4/16   Cancels/No Shows: 1/0       Subjective:    Pain:  [x] Yes  [] No  Location: L shoulder   Pain Rating: (0-10 scale) 6/10  Pain altered Tx:  [x] No  [] Yes  Action:  Comments: Pt arrives noting she has been doing

## 2024-01-22 ENCOUNTER — HOSPITAL ENCOUNTER (OUTPATIENT)
Dept: PHYSICAL THERAPY | Facility: CLINIC | Age: 49
Setting detail: THERAPIES SERIES
Discharge: HOME | End: 2024-01-22
Payer: COMMERCIAL

## 2024-01-22 PROCEDURE — 97016 VASOPNEUMATIC DEVICE THERAPY: CPT

## 2024-01-22 PROCEDURE — 97110 THERAPEUTIC EXERCISES: CPT

## 2024-01-23 ENCOUNTER — TELEPHONE (OUTPATIENT)
Dept: ORTHOPEDIC SURGERY | Age: 49
End: 2024-01-23

## 2024-01-23 NOTE — TELEPHONE ENCOUNTER
[employer] disability paperwork was faxed to [facility] on 1/18/24 and placed on my desk on 1/22/24. I am not seeing an NATASHA on file for [employer]. Attempted to contact patient (mobile number) to see about completing an NATASHA for [employer] (fax number [phone]). No answer and unable to LVM.    Paperwork is nearly completed; awaiting Dr. Dinh's review and signature. Paperwork at [facility] in his folder requiring his signature.

## 2024-01-23 NOTE — TELEPHONE ENCOUNTER
Pt called back and is understanding of the need for NATASHA. NATASHA prepped and placed in folder next to  at [facility]. Patient states that she will come into office, most likely on 1/24/24, to sign and date form.

## 2024-01-24 ENCOUNTER — HOSPITAL ENCOUNTER (OUTPATIENT)
Dept: PHYSICAL THERAPY | Facility: CLINIC | Age: 49
Setting detail: THERAPIES SERIES
Discharge: HOME | End: 2024-01-24
Payer: COMMERCIAL

## 2024-01-24 PROCEDURE — 97110 THERAPEUTIC EXERCISES: CPT

## 2024-01-24 PROCEDURE — 97016 VASOPNEUMATIC DEVICE THERAPY: CPT

## 2024-01-24 NOTE — FLOWSHEET NOTE
[] [facility]  Outpatient Rehabilitation &  Therapy  [address]  [phone]  F: [phone] [x] [facility]  Outpatient Rehabilitation &  Therapy  [address]  P: [phone]  F: [phone] [] [facility]  Outpatient Rehabilitation &  Therapy  [address]  P: [phone]  F: [phone] [] [facility]  Outpatient Rehabilitation &  Therapy  [address]  P: [phone]  F: [phone] [] [facility]  Outpatient Rehabilitation &  Therapy  [address]   P: [phone]  F: [phone]  [] [facility]  Outpatient Rehabilitation &  Therapy  [address]   P: [phone]  F: [phone] [] [facility]  Outpatient Rehabilitation &  Therapy  [address]  P: [phone]  F: [phone] [] [facility]  Outpatient Rehabilitation &  Therapy  [address]  P: [phone]  F: [phone] [] [facility]  Outpatient Rehabilitation &  Therapy  [address]  P: [phone]  F: [phone]      Physical Therapy Daily Treatment Note    Date:  2024  Patient Name:  Annalisa Claire    :  1975  MRN: [id number]  Physician: Braxton Dinh MD                              Insurance: [facility] (30v/armen year soft max; $0 copay)  Medical Diagnosis: M75.102 (ICD-10-CM) - Tear of left rotator cuff, unspecified tear extent, unspecified whether traumatic                       Rehab Codes: M75.102, M62.81, R29.3, M25.42  Onset Date: 23                                                 Next 's appt.: tbd  Visit# / total visits: 5/16   Cancels/No Shows: 1/0       Subjective:    Pain:  [x] Yes  [] No  Location: L shoulder   Pain Rating: (0-10 scale) 4/10  Pain altered Tx:  [x] No  [] Yes  Action:  Comments: Patient states that HEP is achy

## 2024-01-24 NOTE — TELEPHONE ENCOUNTER
Paperwork completed and signed by Dr. Dinh. Scanned into chart. Awaiting completed NATASHA in order to fax it over to [employer].

## 2024-01-25 NOTE — TELEPHONE ENCOUNTER
NATASHA signed and dated by patient on 1/24/24. [employer] disability e-faxed to [employer].

## 2024-01-26 ENCOUNTER — HOSPITAL ENCOUNTER (OUTPATIENT)
Dept: PHYSICAL THERAPY | Facility: CLINIC | Age: 49
Setting detail: THERAPIES SERIES
Discharge: HOME | End: 2024-01-26
Payer: COMMERCIAL

## 2024-01-26 PROCEDURE — 97016 VASOPNEUMATIC DEVICE THERAPY: CPT

## 2024-01-26 PROCEDURE — 97110 THERAPEUTIC EXERCISES: CPT

## 2024-01-26 NOTE — FLOWSHEET NOTE
[] [facility]  Outpatient Rehabilitation &  Therapy  [address]  [phone]  F: [phone] [x] [facility]  Outpatient Rehabilitation &  Therapy  [address]  P: [phone]  F: [phone] [] [facility]  Outpatient Rehabilitation &  Therapy  [address]  P: [phone]  F: [phone] [] [facility]  Outpatient Rehabilitation &  Therapy  [address]  P: [phone]  F: [phone] [] [facility]  Outpatient Rehabilitation &  Therapy  [address]   P: [phone]  F: [phone]  [] [facility]  Outpatient Rehabilitation &  Therapy  [address]   P: [phone]  F: [phone] [] [facility]  Outpatient Rehabilitation &  Therapy  [address]  P: [phone]  F: [phone] [] [facility]  Outpatient Rehabilitation &  Therapy  [address]  P: [phone]  F: [phone] [] [facility]  Outpatient Rehabilitation &  Therapy  [address]  P: [phone]  F: [phone]      Physical Therapy Daily Treatment Note    Date:  2024  Patient Name:  Annalisa Claire    :  1975  MRN: [id number]  Physician: Braxton Dinh MD                              Insurance: [facility] (30v/armen year soft max; $0 copay)  Medical Diagnosis: M75.102 (ICD-10-CM) - Tear of left rotator cuff, unspecified tear extent, unspecified whether traumatic                       Rehab Codes: M75.102, M62.81, R29.3, M25.42  Onset Date: 23                                                 Next 's appt.: tbd  Visit# / total visits: 7/16   Cancels/No Shows: 1/0       Subjective:    Pain:  [x] Yes  [] No  Location: L shoulder   Pain Rating: (0-10 scale) 3/10  Pain altered Tx:  [x] No  [] Yes  Action:  Comments: Pt arrives noting that pain is

## 2024-01-29 ENCOUNTER — HOSPITAL ENCOUNTER (OUTPATIENT)
Dept: PHYSICAL THERAPY | Facility: CLINIC | Age: 49
Setting detail: THERAPIES SERIES
Discharge: HOME | End: 2024-01-29
Payer: COMMERCIAL

## 2024-01-29 PROCEDURE — 97110 THERAPEUTIC EXERCISES: CPT

## 2024-01-29 PROCEDURE — 97016 VASOPNEUMATIC DEVICE THERAPY: CPT

## 2024-01-29 NOTE — FLOWSHEET NOTE
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      Problems:    [x] ? Pain: up to 10/10 pain L shoulder  [x] ? ROM: reduced L shoulder passive and active ROM globally  [x] ? Strength: impaired LUE and scapular strength globally  [x] ? Function: impaired function indicated by UEFS 87.5%   [x] Other:  impaired posture, LUE swelling        Goals  MET NOT MET ON-  GOING  Details   Date Addressed:            STG: To be met in 8 treatments            1. ? Pain: Decrease L shoulder pain levels to 7/10 or less to ease ADL progression. []  []  []      2. ? ROM: Increase L shoulder PROM limitations throughout to at least 145 degrees flexion and abduction and 55 degrees ER to reduce difficulty with ADLs. []  []  []      3. ? Strength: Increase L elbow strength to 5/5 and  strength to at least 40# to ease functional limitations and mobility. []  []  []      4. Independent with Home Exercise Programs with ability to demonstrate exercises without cueing for technique.  [x]  []  []      5. Demonstrate knowledge of fall risk prevention  [x]  []  []                  Date Addressed:            LTG: To be met in 16 treatments           1. Improve score on assessment tool UEFS from 87.5% impairment to less than 40% impairment to demonstrate improved functional mobility []  []  []      2. 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 []  []  []      3. Pt to demonstrate ability to flex/abd L shoulder to at least 145 degrees to ease difficulty OH reaching tasks.     Pt to demonstrate ability to IR L shoulder to at least L1 to ease difficulty with grooming/dressing tasks.  []  []  []      4. Pt to improve L shoulder and scapular strength to at least 4/5 to ease difficulty with heavier lifting and carrying tasks.  []

## 2024-01-31 ENCOUNTER — HOSPITAL ENCOUNTER (OUTPATIENT)
Dept: PHYSICAL THERAPY | Facility: CLINIC | Age: 49
Setting detail: THERAPIES SERIES
Discharge: HOME | End: 2024-01-31
Payer: COMMERCIAL

## 2024-01-31 PROCEDURE — 97016 VASOPNEUMATIC DEVICE THERAPY: CPT

## 2024-01-31 PROCEDURE — 97110 THERAPEUTIC EXERCISES: CPT

## 2024-01-31 NOTE — FLOWSHEET NOTE
in pain noting she felt a pop in shoulder yesterday when stretching into flexion with stretchy band.     Objective:  Modalities: GAME READY x MIN pressure x 34 deg to L shoulder - 15 mins end of session  Precautions: Bold completed 1/24/24  Exercise       Reps/ Time Weight/ Level Comments    supine          PROM 10'   Flexion, scaption, ER   Wand flexion  10x2  1# wand Still \"crunchy\"   Wand ER  20x  1# wand  Bolster under elbow    Wand chest press  2x10 1# wand     Ceiling punches - full range  2x10  Full shoulder range, therapist assist with descending    Rhythmic Stabilization 3x20\"  In neutral IR/ER, 25 degrees abd         seated      3 ways biceps curl  20x ea 1#  Added weight 1/17   Scapular Retractions 20x    Added 1/17   Therabar  10xea red New 1/31 twists, 'n' 'u'   Yellow gripper 4 rubberbands 20x  New 1/31         Standing       Shoulder Iso 10x5\"   Flex/ext/Abd/ER             table slides with ball 20x   Big green stability ball                                 Other: check remaining STG's next session     Specific Instructions for next treatment:  -restore PROM , initiate AAROM as tolerated   -gentle scapular and elbow/ strengthening per pt tolerance  -shoulder isometrics   -vaso or CP         AROM  EVAL PROM  EVAL 1/19 1/26 1/29 1/31       Left Right Left Left Left  Left Left    Shld Abd 26 140 59  75 77  88 scap 95 100 P   Shld Flex 50 155 60 115 101 115 118 AA   Shld IR GT T10 NT NT 60 stomach Stomach @ neutral --   Shld ER 35 65 35 44 50 50 55 P   Shld HAB               Treatment Charges: Mins Units   []  Modalities     [x]  Ther Exercise 35 2   []  Manual Therapy     []  Ther Activities     []  Neuro Re-ed     [x]  Vasocompression 15 1   [] Gait     []  Other     Total Billable time 50 3       Assessment: [] Progressing toward goals.    [] No change.     [x] Other: Continues to be limited in L shoulder ROM globally. Encouraged to complete AAROM exercises at home with lightweight bar. Implemented

## 2024-02-02 ENCOUNTER — HOSPITAL ENCOUNTER (OUTPATIENT)
Dept: PHYSICAL THERAPY | Facility: CLINIC | Age: 49
Setting detail: THERAPIES SERIES
Discharge: HOME | End: 2024-02-02

## 2024-02-02 NOTE — FLOWSHEET NOTE
[] [facility]  Outpatient Rehabilitation &  Therapy  [address]  [phone]  [phone] [x] [facility]  Outpatient Rehabilitation &  Therapy  [address]  P: [phone]  F: [phone] [] [facility]  Outpatient Rehabilitation &  Therapy  [address]  P: [phone]  F: [phone] [] [facility]  Outpatient Rehabilitation &  Therapy  [address]  [phone]  [phone] [] [facility]  Outpatient Rehabilitation &  Therapy  [address]   P: [phone]  F: [phone]  [] [facility]  Outpatient Rehabilitation &  Therapy  [address]  P: [phone]  F: [phone] [] [facility]  Outpatient Rehabilitation &  Therapy  [address]  P: [phone]  F: [phone] [] [facility]  Outpatient Rehabilitation &  Therapy  [address]  P: [phone]  F: [phone] [] [facility]  Outpatient Rehabilitation &  Therapy  [address]  P: [phone]  F: [phone]  [] [facility] Outpatient Rehabilitation &  Therapy  [address]  P: [phone]  F: [phone]     Therapy Cancel/No Show note    Date: 2024  Patient: Annalisa Claire  : 1975  MRN: [id number]    Cancels/No Shows to date: 0    For today's appointment patient:    [x]  Cancelled    [] Rescheduled appointment    [] No-show     Reason given by patient:    []  Patient ill    []  Conflicting appointment    [x] No transportation      [] Conflict with work    [] No reason given    [] Weather related    [] COVID-19    [] Other:      Comments:        [x] Next appointment was confirmed    Electronically signed by: Rebecca Balderas, PT

## 2024-02-05 ENCOUNTER — HOSPITAL ENCOUNTER (OUTPATIENT)
Dept: PHYSICAL THERAPY | Facility: CLINIC | Age: 49
Setting detail: THERAPIES SERIES
Discharge: HOME | End: 2024-02-05
Payer: COMMERCIAL

## 2024-02-05 PROCEDURE — 97110 THERAPEUTIC EXERCISES: CPT

## 2024-02-05 PROCEDURE — 97016 VASOPNEUMATIC DEVICE THERAPY: CPT

## 2024-02-05 NOTE — FLOWSHEET NOTE
[] [facility]  Outpatient Rehabilitation &  Therapy  [address]  [phone]  F: [phone] [x] [facility]  Outpatient Rehabilitation &  Therapy  [address]  P: [phone]  F: [phone] [] [facility]  Outpatient Rehabilitation &  Therapy  [address]  P: [phone]  F: [phone] [] [facility]  Outpatient Rehabilitation &  Therapy  [address]  P: [phone]  F: [phone] [] [facility]  Outpatient Rehabilitation &  Therapy  [address]   P: [phone]  F: [phone]  [] [facility]  Outpatient Rehabilitation &  Therapy  [address]   P: [phone]  F: [phone] [] [facility]  Outpatient Rehabilitation &  Therapy  [address]  P: [phone]  F: [phone] [] [facility]  Outpatient Rehabilitation &  Therapy  [address]  P: [phone]  F: [phone] [] [facility]  Outpatient Rehabilitation &  Therapy  [address]  P: [phone]  F: [phone]      Physical Therapy Daily Treatment Note    Date:  2024  Patient Name:  Annalisa Claire    :  1975  MRN: [id number]  Physician: Braxton Dinh MD                              Insurance: [facility] (30v/armen year soft max; $0 copay)  Medical Diagnosis: M75.102 (ICD-10-CM) - Tear of left rotator cuff, unspecified tear extent, unspecified whether traumatic                       Rehab Codes: M75.102, M62.81, R29.3, M25.42  Onset Date: 23                                                 Next 's appt.: 24  Visit# / total visits: 10/16   Cancels/No Shows:        Subjective:    Pain:  [x] Yes  [] No  Location: L shoulder   Pain Rating: (0-10 scale) 3/10  Pain altered Tx:  [x] No  [] Yes  Action:  Comments: Pt arrives with less pain

## 2024-02-07 ENCOUNTER — HOSPITAL ENCOUNTER (OUTPATIENT)
Dept: PHYSICAL THERAPY | Facility: CLINIC | Age: 49
Setting detail: THERAPIES SERIES
Discharge: HOME | End: 2024-02-07
Payer: COMMERCIAL

## 2024-02-07 ENCOUNTER — TELEPHONE (OUTPATIENT)
Dept: ORTHOPEDIC SURGERY | Age: 49
End: 2024-02-07

## 2024-02-07 PROCEDURE — 97110 THERAPEUTIC EXERCISES: CPT

## 2024-02-07 PROCEDURE — 97016 VASOPNEUMATIC DEVICE THERAPY: CPT

## 2024-02-07 NOTE — FLOWSHEET NOTE
[] [facility]  Outpatient Rehabilitation &  Therapy  [address]  [phone]  F: [phone] [x] [facility]  Outpatient Rehabilitation &  Therapy  [address]  P: [phone]  F: [phone] [] [facility]  Outpatient Rehabilitation &  Therapy  [address]  P: [phone]  F: [phone] [] [facility]  Outpatient Rehabilitation &  Therapy  [address]  P: [phone]  F: [phone] [] [facility]  Outpatient Rehabilitation &  Therapy  [address]   P: [phone]  F: [phone]  [] [facility]  Outpatient Rehabilitation &  Therapy  [address]   P: [phone]  F: [phone] [] [facility]  Outpatient Rehabilitation &  Therapy  [address]  P: [phone]  F: [phone] [] [facility]  Outpatient Rehabilitation &  Therapy  [address]  P: [phone]  F: [phone] [] [facility]  Outpatient Rehabilitation &  Therapy  [address]  P: [phone]  F: [phone]      Physical Therapy Daily Treatment Note    Date:  2024  Patient Name:  Annalisa Claire    :  1975  MRN: [id number]  Physician: Braxton Dinh MD                              Insurance: [facility] (30v/armen year soft max; $0 copay)  Medical Diagnosis: M75.102 (ICD-10-CM) - Tear of left rotator cuff, unspecified tear extent, unspecified whether traumatic                       Rehab Codes: M75.102, M62.81, R29.3, M25.42  Onset Date: 23                                                 Next 's appt.: 24  Visit# / total visits:    Cancels/No Shows:        Subjective:    Pain:  [x] Yes  [] No  Location: L shoulder   Pain Rating: (0-10 scale) 3/10  Pain altered Tx:  [] No  [x] Yes  Action: minimal progressions, low tolerance to

## 2024-02-07 NOTE — TELEPHONE ENCOUNTER
Patient called office stating that [employer] is requesting additional information for the patient's STD claim. Per patient, [employer] needs clarification on the treatment plan for patient. I informed patient that I would fax her LOV (1/4/24) and her 1/4/24 work letter in hopes that [facility] will have a better understanding of her STD claim. 14/24 OV with Dr. Dinh and letter withholding her from work until 2/4/24 was e-faxed to [employer] ([phone]). I also made note in the cover letters that the patient is currently attending PT 2-3 times a week. Patient was instructed to reach back out with any additional questions. At this point hopefully [employer] will retro approve her STD being that it is now 2/7. Original disability paperwork was submitted to [employer] via fax on 1/25/24.

## 2024-02-09 ENCOUNTER — HOSPITAL ENCOUNTER (OUTPATIENT)
Dept: PHYSICAL THERAPY | Facility: CLINIC | Age: 49
Setting detail: THERAPIES SERIES
Discharge: HOME | End: 2024-02-09
Payer: COMMERCIAL

## 2024-02-09 PROCEDURE — 97110 THERAPEUTIC EXERCISES: CPT

## 2024-02-09 PROCEDURE — 97016 VASOPNEUMATIC DEVICE THERAPY: CPT

## 2024-02-09 NOTE — FLOWSHEET NOTE
[] [facility]  Outpatient Rehabilitation &  Therapy  [address]  [phone]  F: [phone] [x] [facility]  Outpatient Rehabilitation &  Therapy  [address]  P: [phone]  F: [phone] [] [facility]  Outpatient Rehabilitation &  Therapy  [address]  P: [phone]  F: [phone] [] [facility]  Outpatient Rehabilitation &  Therapy  [address]  P: [phone]  F: [phone] [] [facility]  Outpatient Rehabilitation &  Therapy  [address]   P: [phone]  F: [phone]  [] [facility]  Outpatient Rehabilitation &  Therapy  [address]   P: [phone]  F: [phone] [] [facility]  Outpatient Rehabilitation &  Therapy  [address]  P: [phone]  F: [phone] [] [facility]  Outpatient Rehabilitation &  Therapy  [address]  P: [phone]  F: [phone] [] [facility]  Outpatient Rehabilitation &  Therapy  [address]  P: [phone]  F: [phone]      Physical Therapy Daily Treatment Note    Date:  2024  Patient Name:  Annalisa Claire    :  1975  MRN: [id number]  Physician: Braxton Dinh MD                              Insurance: [facility] (30v/armen year soft max; $0 copay)  Medical Diagnosis: M75.102 (ICD-10-CM) - Tear of left rotator cuff, unspecified tear extent, unspecified whether traumatic                       Rehab Codes: M75.102, M62.81, R29.3, M25.42  Onset Date: 23                                                 Next 's appt.: 24  Visit# / total visits:    Cancels/No Shows: 1/0       Subjective:    Pain:  [x] Yes  [] No  Location: L shoulder   Pain Rating: (0-10 scale) 2/10  Pain altered Tx:  [] No  [x] Yes  Action: minimal progressions, low tolerance to

## 2024-02-12 ENCOUNTER — OFFICE VISIT (OUTPATIENT)
Dept: ORTHOPEDIC SURGERY | Age: 49
End: 2024-02-12

## 2024-02-12 ENCOUNTER — HOSPITAL ENCOUNTER (OUTPATIENT)
Dept: PHYSICAL THERAPY | Facility: CLINIC | Age: 49
Setting detail: THERAPIES SERIES
Discharge: HOME | End: 2024-02-12
Payer: COMMERCIAL

## 2024-02-12 ENCOUNTER — TELEPHONE (OUTPATIENT)
Dept: ORTHOPEDIC SURGERY | Age: 49
End: 2024-02-12

## 2024-02-12 VITALS — RESPIRATION RATE: 14 BRPM | WEIGHT: 215 LBS | BODY MASS INDEX: 39.56 KG/M2 | HEIGHT: 62 IN

## 2024-02-12 DIAGNOSIS — M75.102 TEAR OF LEFT ROTATOR CUFF, UNSPECIFIED TEAR EXTENT, UNSPECIFIED WHETHER TRAUMATIC: Primary | ICD-10-CM

## 2024-02-12 PROCEDURE — 97110 THERAPEUTIC EXERCISES: CPT

## 2024-02-12 PROCEDURE — 97016 VASOPNEUMATIC DEVICE THERAPY: CPT

## 2024-02-12 RX ORDER — LIDOCAINE HYDROCHLORIDE 10 MG/ML
3 INJECTION, SOLUTION INFILTRATION; PERINEURAL ONCE
Status: COMPLETED | OUTPATIENT
Start: 2024-02-12 | End: 2024-02-12

## 2024-02-12 RX ORDER — TRIAMCINOLONE ACETONIDE 40 MG/ML
40 INJECTION, SUSPENSION INTRA-ARTICULAR; INTRAMUSCULAR ONCE
Status: COMPLETED | OUTPATIENT
Start: 2024-02-12 | End: 2024-02-12

## 2024-02-12 RX ADMIN — LIDOCAINE HYDROCHLORIDE 3 ML: 10 INJECTION, SOLUTION INFILTRATION; PERINEURAL at 09:07

## 2024-02-12 RX ADMIN — TRIAMCINOLONE ACETONIDE 40 MG: 40 INJECTION, SUSPENSION INTRA-ARTICULAR; INTRAMUSCULAR at 09:08

## 2024-02-12 NOTE — FLOWSHEET NOTE
[] [facility]  Outpatient Rehabilitation &  Therapy  [address]  [phone]  F: [phone] [x] [facility]  Outpatient Rehabilitation &  Therapy  [address]  P: [phone]  F: [phone] [] [facility]  Outpatient Rehabilitation &  Therapy  [address]  P: [phone]  F: [phone] [] [facility]  Outpatient Rehabilitation &  Therapy  [address]  P: [phone]  F: [phone] [] [facility]  Outpatient Rehabilitation &  Therapy  [address]   P: [phone]  F: [phone]  [] [facility]  Outpatient Rehabilitation &  Therapy  [address]   P: [phone]  F: [phone] [] [facility]  Outpatient Rehabilitation &  Therapy  [address]  P: [phone]  F: [phone] [] [facility]  Outpatient Rehabilitation &  Therapy  [address]  P: [phone]  F: [phone] [] [facility]  Outpatient Rehabilitation &  Therapy  [address]  P: [phone]  F: [phone]      Physical Therapy Daily Treatment Note    Date:  2024  Patient Name:  Annalisa Claire    :  1975  MRN: [id number]  Physician: Braxton Dinh MD                              Insurance: [facility] (30v/armen year soft max; $0 copay)  Medical Diagnosis: M75.102 (ICD-10-CM) - Tear of left rotator cuff, unspecified tear extent, unspecified whether traumatic                       Rehab Codes: M75.102, M62.81, R29.3, M25.42  Onset Date: 23                                                 Next 's appt.: TBD  Visit# / total visits: 13/16   Cancels/No Shows: 1/0       Subjective:    Pain:  [x] Yes  [] No  Location: L shoulder   Pain Rating: (0-10 scale) 2/10  Pain altered Tx:  [] No  [x] Yes  Action: minimal progressions due to

## 2024-02-12 NOTE — TELEPHONE ENCOUNTER
Spoke to PT, they wanted to check to see if patient was able to perform pt today as she also received cortisone injections in office. Advised that per Dr. Dinh's clinic, there are no restrictions given, pt will not hurt them, but if the patient is in increased pain due to the injections, she does not have to do pt.

## 2024-02-13 ENCOUNTER — TELEPHONE (OUTPATIENT)
Dept: ORTHOPEDIC SURGERY | Age: 49
End: 2024-02-13

## 2024-02-13 NOTE — TELEPHONE ENCOUNTER
[facility] Medical Certification [place] [facility] [place] 2/12/24 and placed in Dr. Dinh's Needs Signed folder.    Addendum 2/13/24 at 4:19 pm additional [facility] paperwork received and placed in Dr. Momohs need signed folder.

## 2024-02-13 NOTE — TELEPHONE ENCOUNTER
Dr. Dinh,     Pt calls in requesting that leave be extended through March 11th as she wants additional time to complete physical therapy to improve ROM. Okay to extend until March 11th?

## 2024-02-13 NOTE — TELEPHONE ENCOUNTER
Dr. Dnih,     [facility] medical certification received asking if pt's leave can be extended from 2/5/24 through 2/12/24. Okay to extend?

## 2024-02-14 ENCOUNTER — HOSPITAL ENCOUNTER (OUTPATIENT)
Dept: PHYSICAL THERAPY | Facility: CLINIC | Age: 49
Setting detail: THERAPIES SERIES
Discharge: HOME | End: 2024-02-14
Payer: COMMERCIAL

## 2024-02-14 PROCEDURE — 97016 VASOPNEUMATIC DEVICE THERAPY: CPT

## 2024-02-14 PROCEDURE — 97110 THERAPEUTIC EXERCISES: CPT

## 2024-02-14 NOTE — PROGRESS NOTES
shoulder, function, and active ROM despite improvements. 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      Problems:    [x] ? Pain: up to 10/10 pain L shoulder  [x] ? ROM: reduced L shoulder passive and active ROM globally  [x] ? Strength: impaired LUE and scapular strength globally  [x] ? Function: impaired function indicated by UEFS 87.5%   [x] Other:  impaired posture, LUE swelling        Goals  MET NOT MET ON-  GOING  Details   Date Addressed: 2/5 by primary PT            STG: To be met in 8 treatments            1. ? Pain: Decrease L shoulder pain levels to 7/10 or less to ease ADL progression. [x]  []  []  3/10 on average; with 5/10 movement     2. ? ROM: Increase L shoulder PROM limitations throughout to at least 145 degrees flexion and abduction and 55 degrees ER to reduce difficulty with ADLs. []  []  [x]  Limited globally     3. ? Strength: Increase L elbow strength to 5/5 and  strength to at least 40# to ease functional limitations and mobility. []  []  [x]  Elbow 4+/5 throughout,  at  60#    4. Independent with Home Exercise Programs with ability to demonstrate exercises without cueing for technique.  [x]  []  []      5. Demonstrate knowledge of fall risk prevention  [x]  []  []                  Date Addressed: Reassessed by primary PT 2/14           LTG: To be met in 16 treatments           1. Improve score on assessment tool UEFS from 87.5% impairment to less than 40% impairment to demonstrate improved functional mobility []  []  [x]   31/80 or 61.25% impaired    2. Reduce L shoulder pain levels to

## 2024-02-14 NOTE — FLOWSHEET NOTE
[] [facility]  Outpatient Rehabilitation &  Therapy  [address]  [phone]  F: [phone] [x] [facility]  Outpatient Rehabilitation &  Therapy  [address]  P: [phone]  F: [phone] [] [facility]  Outpatient Rehabilitation &  Therapy  [address]  P: [phone]  F: [phone] [] [facility]  Outpatient Rehabilitation &  Therapy  [address]  P: [phone]  F: [phone] [] [facility]  Outpatient Rehabilitation &  Therapy  [address]   P: [phone]  F: [phone]  [] [facility]  Outpatient Rehabilitation &  Therapy  [address]   P: [phone]  F: [phone] [] [facility]  Outpatient Rehabilitation &  Therapy  [address]  P: [phone]  F: [phone] [] [facility]  Outpatient Rehabilitation &  Therapy  [address]  P: [phone]  F: [phone] [] [facility]  Outpatient Rehabilitation &  Therapy  [address]  P: [phone]  F: [phone]      Physical Therapy Daily Treatment Note    Date:  2024  Patient Name:  Annalisa Claire    :  1975  MRN: [id number]  Physician: Braxton Dinh MD                              Insurance: [facility] (30v/armen year soft max; $0 copay)  Medical Diagnosis: M75.102 (ICD-10-CM) - Tear of left rotator cuff, unspecified tear extent, unspecified whether traumatic                       Rehab Codes: M75.102, M62.81, R29.3, M25.42  Onset Date: 23                                                 Next 's appt.: TBD  Visit# / total visits: 14/16   Cancels/No Shows: 1/0       Subjective:    Pain:  [x] Yes  [] No  Location: L shoulder   Pain Rating: (0-10 scale) 2/10  Pain altered Tx:  [] No  [x] Yes  Action: minimal progressions due to

## 2024-02-15 NOTE — TELEPHONE ENCOUNTER
Paperwork updated to reflect March 11, 2024 as RTW date. Paperwork placed in Dr. Dinh's folder awaiting signature.

## 2024-02-16 ENCOUNTER — HOSPITAL ENCOUNTER (OUTPATIENT)
Dept: PHYSICAL THERAPY | Facility: CLINIC | Age: 49
Setting detail: THERAPIES SERIES
Discharge: HOME | End: 2024-02-16
Payer: COMMERCIAL

## 2024-02-16 PROCEDURE — 97110 THERAPEUTIC EXERCISES: CPT

## 2024-02-16 NOTE — PROGRESS NOTES
HPI: Ms. Claire is a 48-year-old lady who presents for follow-up evaluation of her left shoulder.  She likely has a chronic rotator cuff tear in the shoulder exacerbated recently.  She has been attending physical therapy and states that she has noticed significant improvement in pain and function with physical therapy.  On exam today she does have approximately 100 degrees of active shoulder elevation and 90 degrees of abduction.  She still has a painful arc of motion and 4/5 muscle strength with supraspinatus testing.  At this time she would like to continue to go to physical therapy and I agree with this.  A new prescription was provided.  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  I will see her back in my clinic as needed but she was encouraged to return or call atrt anytime persistent or worsening symptoms or with any questions or concerns    Procedure: left shoulder subacromial space injection  Following an appropriate discussion with the patient regarding the risks and benefits of the procedure she consented to proceed. her left shoulder was prepped using chlorhexadine solution. Using aseptic technique and through a posterior approach, her left shoulder subacromial space was injected with a 4 cc mixture of 1cc 40mg/ml kenalog and 3 cc of 1% lidocaine without epinephrine. A band aid was applied to the injection site. she tolerated the injection with no immediate adverse reactions.

## 2024-02-16 NOTE — FLOWSHEET NOTE
[] [facility]  Outpatient Rehabilitation &  Therapy  [address]  [phone]  F: [phone] [x] [facility]  Outpatient Rehabilitation &  Therapy  [address]  P: [phone]  F: [phone] [] [facility]  Outpatient Rehabilitation &  Therapy  [address]  P: [phone]  F: [phone] [] [facility]  Outpatient Rehabilitation &  Therapy  [address]  P: [phone]  F: [phone] [] [facility]  Outpatient Rehabilitation &  Therapy  [address]   P: [phone]  F: [phone]  [] [facility]  Outpatient Rehabilitation &  Therapy  [address]   P: [phone]  F: [phone] [] [facility]  Outpatient Rehabilitation &  Therapy  [address]  P: [phone]  F: [phone] [] [facility]  Outpatient Rehabilitation &  Therapy  [address]  P: [phone]  F: [phone] [] [facility]  Outpatient Rehabilitation &  Therapy  [address]  P: [phone]  F: [phone]      Physical Therapy Daily Treatment Note    Date:  2024  Patient Name:  Annalisa Claire    :  1975  MRN: [id number]  Physician: Braxton Dinh MD                              Insurance: [facility] (30v/armen year soft max; $0 copay)  Medical Diagnosis: M75.102 (ICD-10-CM) - Tear of left rotator cuff, unspecified tear extent, unspecified whether traumatic                       Rehab Codes: M75.102, M62.81, R29.3, M25.42  Onset Date: 23                                                 Next 's appt.: TBD  Visit# / total visits: 15/26 (additional visits approved by MD on )   Cancels/No Shows: 1/0       Subjective:    Pain:  [x] Yes  [] No  Location: L shoulder   Pain Rating: (0-10 scale) 210  Pain altered Tx:  [] No  [x] Yes  Action:

## 2024-02-19 ENCOUNTER — HOSPITAL ENCOUNTER (OUTPATIENT)
Dept: PHYSICAL THERAPY | Facility: CLINIC | Age: 49
Setting detail: THERAPIES SERIES
Discharge: HOME | End: 2024-02-19
Payer: COMMERCIAL

## 2024-02-19 PROCEDURE — 97110 THERAPEUTIC EXERCISES: CPT

## 2024-02-19 PROCEDURE — 97016 VASOPNEUMATIC DEVICE THERAPY: CPT

## 2024-02-19 NOTE — FLOWSHEET NOTE
[] [facility]  Outpatient Rehabilitation &  Therapy  [address]  [phone]  F: [phone] [x] [facility]  Outpatient Rehabilitation &  Therapy  [address]  P: [phone]  F: [phone] [] [facility]  Outpatient Rehabilitation &  Therapy  [address]  P: [phone]  F: [phone] [] [facility]  Outpatient Rehabilitation &  Therapy  [address]  P: [phone]  F: [phone] [] [facility]  Outpatient Rehabilitation &  Therapy  [address]   P: [phone]  F: [phone]  [] [facility]  Outpatient Rehabilitation &  Therapy  [address]   P: [phone]  F: [phone] [] [facility]  Outpatient Rehabilitation &  Therapy  [address]  P: [phone]  F: [phone] [] [facility]  Outpatient Rehabilitation &  Therapy  [address]  P: [phone]  F: [phone] [] [facility]  Outpatient Rehabilitation &  Therapy  [address]  P: [phone]  F: [phone]      Physical Therapy Daily Treatment Note    Date:  2024  Patient Name:  Annalisa Claire    :  1975  MRN: [id number]  Physician: Braxton Dinh MD                              Insurance: [facility] (30v/armen year soft max; $0 copay)  Medical Diagnosis: M75.102 (ICD-10-CM) - Tear of left rotator cuff, unspecified tear extent, unspecified whether traumatic                       Rehab Codes: M75.102, M62.81, R29.3, M25.42  Onset Date: 23                                                 Next 's appt.: TBD  Visit# / total visits:  (additional visits approved by MD on )   Cancels/No Shows:        Subjective:    Pain:  [x] Yes  [] No  Location: L shoulder   Pain Rating: (0-10 scale) 1/10  Pain altered Tx:  [x] No  [] Yes  Action:

## 2024-02-21 ENCOUNTER — HOSPITAL ENCOUNTER (OUTPATIENT)
Dept: PHYSICAL THERAPY | Facility: CLINIC | Age: 49
Setting detail: THERAPIES SERIES
Discharge: HOME | End: 2024-02-21
Payer: COMMERCIAL

## 2024-02-21 PROCEDURE — 97016 VASOPNEUMATIC DEVICE THERAPY: CPT

## 2024-02-21 PROCEDURE — 97110 THERAPEUTIC EXERCISES: CPT

## 2024-02-21 NOTE — FLOWSHEET NOTE
[] [facility]  Outpatient Rehabilitation &  Therapy  [address]  [phone]  F: [phone] [x] [facility]  Outpatient Rehabilitation &  Therapy  [address]  P: [phone]  F: [phone] [] [facility]  Outpatient Rehabilitation &  Therapy  [address]  P: [phone]  F: [phone] [] [facility]  Outpatient Rehabilitation &  Therapy  [address]  P: [phone]  F: [phone] [] [facility]  Outpatient Rehabilitation &  Therapy  [address]   P: [phone]  F: [phone]  [] [facility]  Outpatient Rehabilitation &  Therapy  [address]   P: [phone]  F: [phone] [] [facility]  Outpatient Rehabilitation &  Therapy  [address]  P: [phone]  F: [phone] [] [facility]  Outpatient Rehabilitation &  Therapy  [address]  P: [phone]  F: [phone] [] [facility]  Outpatient Rehabilitation &  Therapy  [address]  P: [phone]  F: [phone]      Physical Therapy Daily Treatment Note    Date:  2024  Patient Name:  Annalisa Claire    :  1975  MRN: [id number]  Physician: Braxton Dinh MD                              Insurance: [facility] (30v/armen year soft max; $0 copay)  Medical Diagnosis: M75.102 (ICD-10-CM) - Tear of left rotator cuff, unspecified tear extent, unspecified whether traumatic                       Rehab Codes: M75.102, M62.81, R29.3, M25.42  Onset Date: 23                                                 Next 's appt.: TBD  Visit# / total visits:  (additional visits approved by MD on )   Cancels/No Shows:        Subjective:    Pain:  [x] Yes  [] No  Location: L shoulder   Pain Rating: (0-10 scale) 1/10  Pain altered Tx:  [x] No  [] Yes  Action:

## 2024-02-23 ENCOUNTER — HOSPITAL ENCOUNTER (OUTPATIENT)
Dept: PHYSICAL THERAPY | Facility: CLINIC | Age: 49
Setting detail: THERAPIES SERIES
Discharge: HOME | End: 2024-02-23
Payer: COMMERCIAL

## 2024-02-23 PROCEDURE — 97110 THERAPEUTIC EXERCISES: CPT

## 2024-02-23 PROCEDURE — 97016 VASOPNEUMATIC DEVICE THERAPY: CPT

## 2024-02-23 NOTE — FLOWSHEET NOTE
after 2 reps 2/16   IR/ER Walk out  10x5\" [place] 2/14 Cues to maintain static hold to keep muscle activation, increased resistance 2/23         table slides with ball 20x   Big green stability ball                                 Other:            AROM  EVAL PROM  EVAL 1/19 1/26 1/29 1/31   2/5 2/7 2/9 2/12 2/14 2/16 2/19 2/21 2/23     Left Right Left Left Left  Left Left             Shld Abd 26 140 59  75 77  88 scap 95 100 P 95 P 110 P 123 P inferior glide 137 P inferior glide 142 P  63 A  141P   149P 150 P 160° passive  55° active   Shld Flex 50 155 60 115 101 115 118 AA 110P   112  P 131P inferior glide 131 P 144 P  79 A  133p supine  136A supine   154  P  155 passive  80 active   Shld IR GT T10 NT NT 60 stomach Stomach @ neutral --     L5       Shld ER 35 65 35 44 50 50 55 P 65 70 73P inferior glide 68 P 75 P  WNL 75P  85 passive   Shld HAB                        Functional Test: UEFS Score: 10/80 or 87.5% functionally impaired at initial evaluation Score: 31/80 or  61.25% functionally impaired at 14th visit           Treatment Charges: Mins Units   []  Modalities     [x]  Ther Exercise 43 3   []  Manual Therapy     []  Ther Activities     []  Neuro Re-ed     [x]  Vasocompression 10 1   [] Gait     []  Other     Total Billable time 53 4     Assessment: [x] Progressing toward goals. Initiated treatment with UBE warm up for endurance followed by pulley's to promote increased ROM. After pulleys, pt completes active flexion to 80° and scaption to 55°. Pt then completed standing routine. With ball rolls into scaption, had pt rest hand on ball positioning arm in neutral. Able to increase resistance of t-band. Pt then completed rest of bolded therapeutic exercises on the mat followed by PROM/PTA. Notable \"crunching\" during PROM especially with flexion. Passive measurements as noted above. Ended with vaso per pt request to reduce soreness. Will continue to progress as time allows next session.   [] No

## 2024-02-26 ENCOUNTER — HOSPITAL ENCOUNTER (OUTPATIENT)
Dept: PHYSICAL THERAPY | Facility: CLINIC | Age: 49
Setting detail: THERAPIES SERIES
Discharge: HOME | End: 2024-02-26
Payer: COMMERCIAL

## 2024-02-26 PROCEDURE — 97110 THERAPEUTIC EXERCISES: CPT

## 2024-02-26 PROCEDURE — 97016 VASOPNEUMATIC DEVICE THERAPY: CPT

## 2024-02-26 NOTE — FLOWSHEET NOTE
[] [facility]  Outpatient Rehabilitation &  Therapy  [address]  [phone]  F: [phone] [x] [facility]  Outpatient Rehabilitation &  Therapy  [address]  P: [phone]  F: [phone] [] [facility]  Outpatient Rehabilitation &  Therapy  [address]  P: [phone]  F: [phone] [] [facility]  Outpatient Rehabilitation &  Therapy  [address]  P: [phone]  F: [phone] [] [facility]  Outpatient Rehabilitation &  Therapy  [address]   P: [phone]  F: [phone]  [] [facility]  Outpatient Rehabilitation &  Therapy  [address]   P: [phone]  F: [phone] [] [facility]  Outpatient Rehabilitation &  Therapy  [address]  P: [phone]  F: [phone] [] [facility]  Outpatient Rehabilitation &  Therapy  [address]  P: [phone]  F: [phone] [] [facility]  Outpatient Rehabilitation &  Therapy  [address]  P: [phone]  F: [phone]      Physical Therapy Daily Treatment Note    Date:  2024  Patient Name:  Annalisa Claire    :  1975  MRN: [id number]  Physician: Braxton Dinh MD                              Insurance: [facility] (30v/armen year soft max; $0 copay)  Medical Diagnosis: M75.102 (ICD-10-CM) - Tear of left rotator cuff, unspecified tear extent, unspecified whether traumatic                       Rehab Codes: M75.102, M62.81, R29.3, M25.42  Onset Date: 23                                                 Next 's appt.: TBD  Visit# / total visits:  (additional visits approved by MD on )   Cancels/No Shows:        Subjective:    Pain:  [x] Yes  [] No  Location: L shoulder   Pain Rating: (0-10 scale) 1/10  Pain altered Tx:  [x] No  [] Yes  Action:

## 2024-02-28 ENCOUNTER — APPOINTMENT (OUTPATIENT)
Dept: PHYSICAL THERAPY | Facility: CLINIC | Age: 49
End: 2024-02-28
Payer: COMMERCIAL

## 2024-02-29 ENCOUNTER — HOSPITAL ENCOUNTER (OUTPATIENT)
Dept: PHYSICAL THERAPY | Facility: CLINIC | Age: 49
Setting detail: THERAPIES SERIES
Discharge: HOME | End: 2024-02-29
Payer: COMMERCIAL

## 2024-02-29 PROCEDURE — 97016 VASOPNEUMATIC DEVICE THERAPY: CPT

## 2024-02-29 PROCEDURE — 97110 THERAPEUTIC EXERCISES: CPT

## 2024-02-29 NOTE — FLOWSHEET NOTE
limited globally see above   4. Pt to improve L shoulder and scapular strength to at least 4/5 to ease difficulty with heavier lifting and carrying tasks.  []  []  [x]   limited globally    5. Pt to report ability to sleep throughout the night without disturbance due to L shoulder pain to improve quality of life.  []  []  [x]   Has to sleep on couch propped up to minimize pain          Patient goals: reduce pain, restore function        Pt. Education:  [x] Yes  [] No  [x] Reviewed Prior HEP/Ed  Method of Education: [x] Verbal   [] Demo   [] Written     Access Code: [email or web address]  URL: [email or web address]  Date: 01/09/2024  Prepared by: Rebecca Patton     Exercises  - Circular Shoulder Pendulum with Table Support  - 1 x daily - 7 x weekly - 3 sets - 10 reps  - Flexion-Extension Shoulder Pendulum with Table Support  - 1 x daily - 7 x weekly - 3 sets - 10 reps  - Gentle Horizontal Shoulder Pendulum  - 1 x daily - 7 x weekly - 3 sets - 10 reps  - Standing Elbow Flexion Extension AROM  - 1 x daily - 7 x weekly - 3 sets - 10 reps  - Standing Forearm Pronation and Supination AROM  - 1 x daily - 7 x weekly - 3 sets - 10 reps  - Seated Gripping Towel  - 1 x daily - 7 x weekly - 3 sets - 10 reps  - Seated Cervical Rotation AROM  - 1 x daily - 7 x weekly - 3 sets - 10 reps  - Seated Cervical Extension AROM  - 1 x daily - 7 x weekly - 3 sets - 10 reps  - Seated Cervical Sidebending AROM  - 1 x daily - 7 x weekly - 3 sets - 10 reps    Date: 02/07/2024  Prepared by: Rebecca Patton  Exercises  - Supine Shoulder Flexion Extension AAROM with Dowel  - 1 x daily - 7 x weekly - 10 reps  - Supine Shoulder External Rotation in 45 Degrees Abduction AAROM with Dowel  - 1 x daily - 7 x weekly - 10 reps  - Supine Shoulder Press AAROM in Abduction with Dowel  - 1 x daily - 7 x weekly - 2 sets - 10 reps  - Seated Shoulder Scaption Slide at Table Top with Forearm in Neutral  - 1 x daily - 7 x weekly - 2 sets - 10 reps  - Standing

## 2024-03-01 ENCOUNTER — HOSPITAL ENCOUNTER (OUTPATIENT)
Dept: PHYSICAL THERAPY | Facility: CLINIC | Age: 49
Setting detail: THERAPIES SERIES
Discharge: HOME | End: 2024-03-01
Payer: COMMERCIAL

## 2024-03-01 PROCEDURE — 97110 THERAPEUTIC EXERCISES: CPT

## 2024-03-01 PROCEDURE — 97016 VASOPNEUMATIC DEVICE THERAPY: CPT

## 2024-03-01 NOTE — FLOWSHEET NOTE
[] [facility]  Outpatient Rehabilitation &  Therapy  [address]  [phone]  F: [phone] [x] [facility]  Outpatient Rehabilitation &  Therapy  [address]  P: [phone]  F: [phone] [] [facility]  Outpatient Rehabilitation &  Therapy  [address]  P: [phone]  F: [phone] [] [facility]  Outpatient Rehabilitation &  Therapy  [address]  P: [phone]  F: [phone] [] [facility]  Outpatient Rehabilitation &  Therapy  [address]   P: [phone]  F: [phone]  [] [facility]  Outpatient Rehabilitation &  Therapy  [address]   P: [phone]  F: [phone] [] [facility]  Outpatient Rehabilitation &  Therapy  [address]  P: [phone]  F: [phone] [] [facility]  Outpatient Rehabilitation &  Therapy  [address]  P: [phone]  F: [phone] [] [facility]  Outpatient Rehabilitation &  Therapy  [address]  P: [phone]  F: [phone]      Physical Therapy Daily Treatment Note    Date:  3/1/2024  Patient Name:  Annalisa Claire    :  1975  MRN: [id number]  Physician: Braxton Dinh MD                              Insurance: [facility] (30v/armen year soft max; $0 copay)  Medical Diagnosis: M75.102 (ICD-10-CM) - Tear of left rotator cuff, unspecified tear extent, unspecified whether traumatic                       Rehab Codes: M75.102, M62.81, R29.3, M25.42  Onset Date: 23                                                 Next 's appt.: TBD  Visit# / total visits:  (additional visits approved by MD on )   Cancels/No Shows:        Subjective:    Pain:  [x] Yes  [] No  Location: L shoulder   Pain Rating: (0-10 scale) 1/10  Pain altered Tx:  [x] No  [] Yes  Action:

## 2024-03-06 ENCOUNTER — HOSPITAL ENCOUNTER (OUTPATIENT)
Dept: PHYSICAL THERAPY | Facility: CLINIC | Age: 49
Setting detail: THERAPIES SERIES
Discharge: HOME | End: 2024-03-06
Payer: COMMERCIAL

## 2024-03-06 PROCEDURE — 97110 THERAPEUTIC EXERCISES: CPT

## 2024-03-06 PROCEDURE — 97016 VASOPNEUMATIC DEVICE THERAPY: CPT

## 2024-03-06 NOTE — FLOWSHEET NOTE
[] [facility]  Outpatient Rehabilitation &  Therapy  [address]  [phone]  F: [phone] [x] [facility]  Outpatient Rehabilitation &  Therapy  [address]  P: [phone]  F: [phone] [] [facility]  Outpatient Rehabilitation &  Therapy  [address]  P: [phone]  F: [phone] [] [facility]  Outpatient Rehabilitation &  Therapy  [address]  P: [phone]  F: [phone] [] [facility]  Outpatient Rehabilitation &  Therapy  [address]   P: [phone]  F: [phone]  [] [facility]  Outpatient Rehabilitation &  Therapy  [address]   P: [phone]  F: [phone] [] [facility]  Outpatient Rehabilitation &  Therapy  [address]  [address]  P: [phone]  F: [phone] [] [facility]  Outpatient Rehabilitation &  Therapy  [address]  P: [phone]  F: [phone] [] [facility]  Outpatient Rehabilitation &  Therapy  [address]  P: [phone]  F: [phone]      Physical Therapy Daily Treatment Note    Date:  3/6/2024  Patient Name:  Annalisa Claire    :  1975  MRN: [id number]  Physician: Braxton Dinh MD                              Insurance: [facility] (30v/armen year soft max; $0 copay)  Medical Diagnosis: M75.102 (ICD-10-CM) - Tear of left rotator cuff, unspecified tear extent, unspecified whether traumatic                       Rehab Codes: M75.102, M62.81, R29.3, M25.42  Onset Date: 23                                                 Next 's appt.: TBD  Visit# / total visits:  (additional visits approved by MD on )   Cancels/No Shows:        Subjective:    Pain:  [x] Yes  [] No  Location: L shoulder   Pain Rating: (0-10 scale) 1/10 \"dull/achy\"   Pain altered Tx:  [x] No  []

## 2024-03-07 ENCOUNTER — TELEPHONE (OUTPATIENT)
Dept: ORTHOPEDIC SURGERY | Age: 49
End: 2024-03-07

## 2024-03-07 NOTE — TELEPHONE ENCOUNTER
[facility] paperwork received at [place] [facility] via fax.     NATASHA on file.     Paperwork placed on [employer] desk.

## 2024-03-08 ENCOUNTER — HOSPITAL ENCOUNTER (OUTPATIENT)
Dept: PHYSICAL THERAPY | Facility: CLINIC | Age: 49
Setting detail: THERAPIES SERIES
Discharge: HOME | End: 2024-03-08
Payer: COMMERCIAL

## 2024-03-08 PROCEDURE — 97016 VASOPNEUMATIC DEVICE THERAPY: CPT

## 2024-03-08 PROCEDURE — 97110 THERAPEUTIC EXERCISES: CPT

## 2024-03-08 NOTE — TELEPHONE ENCOUNTER
After reviewing this paperwork it was determined that this is a duplicate set of paperwork for patient. This exact paperwork was completed and faxed to [facility] on 2/20/24. Paperwork and fax confirmation scanned into the chart on 2/20/24. This paperwork faxed on 2/20 lists the most up-to-date end date for disability as 3/11/24. I again faxed the 2/20 paperwork via [employer] today and shredding duplicate paperwork.

## 2024-03-08 NOTE — FLOWSHEET NOTE
2/26, inc band    Ext 2x10 blue New 2/26, inc band 3/6    table slides with ball 20x   Big green stability ball   Wall climbs 10x        Ball on wall  10xea  Playball    up/down, side to side, small movement- challenging 3/6     Flexion wall slides   x    unable to tolerate- held 3/6          Other:      Previous measurements deleted to clean up chart- refer back as needed       2/14 2/16 2/19 2/21 2/23 2/26 3/1 3/6 3/8                Shld Abd 142 P  63 A  141P   149P 150 P 160° p  55° a 170 P 157 P 135 P abd   145 P scap 140P abduction   Shld Flex 144 P  79 A  133p supine  136A supine   154  P  155 p  80 a 162 P 163 P 162 P  156P   Shld IR L5      WNL     Shld ER 75 P  WNL 75P  85 p Didn't measure WNL      Shld HAB              Functional Test: UEFS Score: 10/80 or 87.5% functionally impaired at initial evaluation Score: 31/80 or  61.25% functionally impaired at 14th visit           Treatment Charges: Mins Units   []  Modalities     [x]  Ther Exercise 42 3   []  Manual Therapy     []  Ther Activities     []  Neuro Re-ed     [x]  Vasocompression 10 1   [] Gait     []  Other     Total Billable time 52 4       Assessment: [] Progressing toward goals.  [] No change.     [x] Other: Again began session with UBE warm up followed by pulley's and wall slides for ROM. Tband strengthening completed after. Much weakness and difficulty noted with ER, with only small range motion completed. L shldr PROM completed with pt reporting feelings of \"crunching\" and \"popping\" throughout. Attempted to minimize with grade 1 mobs and gentle distraction and able to increase ROM slightly in abduction. Re-taped with K-tape for L shldr unloading using 3 I-strips with modified \"Y\" and I strip unloading deltoid. Ended with vasocompression in seated to L shldr. Will continue to progress as able.   [x] Patient would continue to benefit from skilled physical therapy services in order to: reduce L shoulder pain, improve L Shoulder ROM, improve

## 2024-03-11 ENCOUNTER — HOSPITAL ENCOUNTER (OUTPATIENT)
Dept: PHYSICAL THERAPY | Facility: CLINIC | Age: 49
Setting detail: THERAPIES SERIES
Discharge: HOME | End: 2024-03-11
Payer: COMMERCIAL

## 2024-03-11 PROCEDURE — 97110 THERAPEUTIC EXERCISES: CPT

## 2024-03-11 PROCEDURE — 97016 VASOPNEUMATIC DEVICE THERAPY: CPT

## 2024-03-11 NOTE — FLOWSHEET NOTE
New 2/26, inc band 3/6    table slides with ball 20x   Big green stability ball   Wall climbs 10x        Ball on wall  20xea  Playball    up/down, side to side, small movement- challenging 3/6     Flexion wall slides   x    unable to tolerate- held 3/6          Other:  K-tape reapplication- L shldr unloading using 3 I-strips with modified \"Y\" and I strip unloading deltoid.     Previous measurements deleted to clean up chart- refer back as needed       2/14 2/16 2/19 2/21 2/23 2/26 3/1 3/6 3/8 3/11                 Shld Abd 142 P  63 A  141P   149P 150 P 160° p  55° a 170 P 157 P 135 P abd   145 P scap 140P abduction 150 abd   Shld Flex 144 P  79 A  133p supine  136A supine   154  P  155 p  80 a 162 P 163 P 162 P  156P 165 P   Shld IR L5      WNL      Shld ER 75 P  WNL 75P  85 p Didn't measure WNL       Shld HAB               Functional Test: UEFS Score: 10/80 or 87.5% functionally impaired at initial evaluation Score: 31/80 or  61.25% functionally impaired at 14th visit           Treatment Charges: Mins Units   []  Modalities     [x]  Ther Exercise 46 3   []  Manual Therapy     []  Ther Activities     []  Neuro Re-ed     [x]  Vasocompression 15 1   [] Gait     []  Other     Total Billable time 61 4       Assessment: [] Progressing toward goals.  [] No change.     [x] Other: Again began session with UBE warm up followed by pulley's to improve ROM with good tolerance and recall to work within comfortable ranges. Followed with tband exercises with continued weakness and rapid fatigue with ER. Improved tolerance to standing AAROM wand exercises with cueing to reduce UT compensation. Improved abduction and flexion PROM measurements today. Re-applied K-tape per pt request to improve support with relief noted post. Ended with vaso for soreness and pain control. Will continue to monitor pt's response and progress per poc.   [x] Patient would continue to benefit from skilled physical therapy services in order to: reduce

## 2024-03-13 ENCOUNTER — HOSPITAL ENCOUNTER (OUTPATIENT)
Dept: PHYSICAL THERAPY | Facility: CLINIC | Age: 49
Setting detail: THERAPIES SERIES
Discharge: HOME | End: 2024-03-13
Payer: COMMERCIAL

## 2024-03-13 PROCEDURE — 97110 THERAPEUTIC EXERCISES: CPT

## 2024-03-13 PROCEDURE — 97016 VASOPNEUMATIC DEVICE THERAPY: CPT

## 2024-03-13 NOTE — FLOWSHEET NOTE
Yes  Action:   Comments: Pt arrives with with ongoing ache in her shoulder, does feel that she is able to resume some light household chores and can perform heavier activity with pain. Most difficulty with active abduction due to pain.          Objective:  Modalities: GAME READY x MIN pressure x 34 deg to L shoulder - 15 mins end of session  Exercise       Reps/ Time Weight/ Level Comments   UBE 2'/2'  Lv 3' Fwd only, inc lv 2/21, 2/29   Pulleys 2'/2' Flexion/scaption  2 min tolance on 3/1, added scaption          Supine          PROM 6'   Flexion, abd, ER with inferior joint mobs added 2/7  Light distraction 2/21    Wand flexion  10x2  3# wand Still \"crunchy\"   Wand ER  20x  2# wand  Bolster under elbow   Tolerable today   Wand chest press  2x10 2# wand  Still \"crunching\", inc wt 2/21    Ceiling punches - full range  2x10 1# Progressed 2/26   Flexion 2x10  2x5 A  1# Progressed 2/26   H.abd 2x10 orange Small range - new 2/26   Rhythmic Stabilization 3x20\"  In neutral IR/ER, 25 degrees abd     90 flexion         Sidelying       Scapular clocks 5x5\"ea  New 2/5- towel under elbow    ER 2x10 AROM AROM completed 3/8   Flexion  10x 1# New 2/14- pain free range; added wt 2/29    Abduction  10x A New 2/21, progressed to A from AA 2/26  Challengeing    Scap retract, depression 10x5\"ea     HAB 5x   New 2/29 - assist at scapula          Seated      3 ways biceps curl  20x ea 2#  Added weight 1/17, inc 2/5   Scapular Retractions 20x    Added 1/17   Therabar  10xea red New 1/31 twists, 'n' 'u'   Yellow gripper 4 rubberbands 20x  New 1/31   Retro shoulder rolls  20x  New 2/7         Standing       Shoulder Iso 10x5\"   Flex/ext/Abd/ER   Flexion, abd, ER AAROM 10ea 1#wand New 2/16  Pt just gets to 90 degrees shoulder flexion           Tband Rows  20x blue New 2/14 Inc to lime after 2 reps 2/16, inc band 3/6   IR/ER Walk out  10x5\" Lime New 2/14 Cues to maintain static hold to keep muscle activation, increased resistance 2/23   ER-

## 2024-03-14 ENCOUNTER — TELEPHONE (OUTPATIENT)
Dept: ORTHOPEDIC SURGERY | Age: 49
End: 2024-03-14

## 2024-03-14 NOTE — TELEPHONE ENCOUNTER
[employer] paperwork received at [place] [facility] via fax on 3/13.      NATASHA on file.      Paperwork placed on [employer] desk.

## 2024-03-15 ENCOUNTER — TELEPHONE (OUTPATIENT)
Dept: ORTHOPEDIC SURGERY | Age: 49
End: 2024-03-15

## 2024-03-15 ENCOUNTER — APPOINTMENT (OUTPATIENT)
Dept: PHYSICAL THERAPY | Facility: CLINIC | Age: 49
End: 2024-03-15
Payer: COMMERCIAL

## 2024-03-15 DIAGNOSIS — M75.102 TEAR OF LEFT ROTATOR CUFF, UNSPECIFIED TEAR EXTENT, UNSPECIFIED WHETHER TRAUMATIC: Primary | ICD-10-CM

## 2024-03-15 NOTE — TELEPHONE ENCOUNTER
Paperwork signed by Dr. Dinh's PA Gómez Alvarenga PA-C.Paperwork faxed to [employer]. Fax confirmation scanned into chart.

## 2024-03-15 NOTE — TELEPHONE ENCOUNTER
Pt called and wants to know if she can get an MRI of her shoulder, she has been doing PT but her visits are running out and she would prefer to know what is wrong with her shoulder and save some of her PT for the possibility of something being wrong enough where she might need surgery..  Pt is informed that Dr. Dinh is out of the office next week and we wont have an answer for her until he returns.

## 2024-03-16 NOTE — TELEPHONE ENCOUNTER
She has a chronically torn rotator cuff. That's likely the problem with her shoulder. Her last MRI was years ago, so yes, an MRI on a 3T MRI can be ordered.

## 2024-03-18 NOTE — TELEPHONE ENCOUNTER
LVM with patient to call office back. If patient is willing then an order for a left shoulder MRI to be completed at [facility] with 3T can be ordered.

## 2024-03-21 ENCOUNTER — TELEPHONE (OUTPATIENT)
Dept: ORTHOPEDIC SURGERY | Age: 49
End: 2024-03-21

## 2024-03-21 NOTE — TELEPHONE ENCOUNTER
Dr. Dinh,     Pt is requesting that her time off work be extended until 4/11/24 so that she can perform MRI and complete remaining PT that she has scheduled. Please advise.

## 2024-03-21 NOTE — TELEPHONE ENCOUNTER
[facility] paperwork received at [place] [facility] via fax on 3/21.      NATASHA on file.      Paperwork placed on "[employer]k.

## 2024-03-21 NOTE — TELEPHONE ENCOUNTER
Pt amendable with 3T MRI. Order placed and pt provided phone number to schedule. Pt informed that MRI must be performed at . [facility]. Pt additionally advised that she will need to call office back to schedule f/u for MRI results. Pt voices understanding.

## 2024-03-21 NOTE — TELEPHONE ENCOUNTER
Contacted pt to discuss new set of disability paperwork. Pt is requesting that her time off work be extended until 4/11/24 so that she can perform MRI (ordered on 3/21/24, see 3/15/24 TE) and complete remaining PT that she has scheduled. Pt informed a message would be sent to Dr. Dinh and a member of the office would reach back out with his response. Pt informed that Dr. Dinh is out of the office and consequently response time may be delayed. Pt voices understanding.    Addendum: Paperwork placed in Dr. Dinh's miscellaneous folder awaiting response from Dr. Dinh.

## 2024-03-22 ENCOUNTER — HOSPITAL ENCOUNTER (OUTPATIENT)
Dept: MRI IMAGING | Age: 49
Discharge: HOME | End: 2024-03-22
Attending: ORTHOPAEDIC SURGERY
Payer: COMMERCIAL

## 2024-03-22 DIAGNOSIS — M75.102 TEAR OF LEFT ROTATOR CUFF, UNSPECIFIED TEAR EXTENT, UNSPECIFIED WHETHER TRAUMATIC: ICD-10-CM

## 2024-03-22 PROCEDURE — 73221 MRI JOINT UPR EXTREM W/O DYE: CPT

## 2024-04-04 ENCOUNTER — OFFICE VISIT (OUTPATIENT)
Dept: ORTHOPEDIC SURGERY | Age: 49
End: 2024-04-04
Payer: COMMERCIAL

## 2024-04-04 ENCOUNTER — TELEPHONE (OUTPATIENT)
Dept: ORTHOPEDIC SURGERY | Age: 49
End: 2024-04-04

## 2024-04-04 VITALS — RESPIRATION RATE: 14 BRPM | HEIGHT: 62 IN | BODY MASS INDEX: 39.56 KG/M2 | WEIGHT: 215 LBS

## 2024-04-04 DIAGNOSIS — M75.102 TEAR OF LEFT ROTATOR CUFF, UNSPECIFIED TEAR EXTENT, UNSPECIFIED WHETHER TRAUMATIC: Primary | ICD-10-CM

## 2024-04-04 PROCEDURE — 99214 OFFICE O/P EST MOD 30 MIN: CPT | Performed by: ORTHOPAEDIC SURGERY

## 2024-04-04 NOTE — TELEPHONE ENCOUNTER
Patient called because she has decided to go ahead with surgery on her left shoulder.  Can you write a booking form for this?

## 2024-04-04 NOTE — PROGRESS NOTES
HPI: Ms. Claire is here today to review the results of her left shoulder MRI study which was completed on 3/22/2024.  I did review the images independently with the patient today and it demonstrates a full-thickness tear of the supraspinatus and leading edge infraspinatus tendons with retraction to the middle of the humeral head.  There is grade 2 fatty atrophy involving supraspinatus and infraspinatus muscles.  She also has what appears to be a full-thickness tear of the subscapularis with retraction medial to the glenoid.  There is grade 4 fatty atrophy of the muscle.  The long head of the biceps tendon in the bicipital groove appears to be completely torn.  I had a discussion with the patient today about her MRI results.  She states that since her last visit she has actually seen improvement in her pain and function with physical therapy.    On examination today she has approximately 135 degrees of active left shoulder elevation, 125 degrees of abduction 40 degrees of external rotation and internal rotation to L4.  She has 4/5 muscle strength with supraspinatus testing.  She has a positive belly press test.    Impression and plan: Ms. Claire is a 48-year-old lady with a chronic left shoulder rotator cuff tear.  She has been treated thus far with use of anti-inflammatories therapy as well as a cortisone injection.  She has made slow progress but progress nonetheless.  We had a discussion today about treatment options moving forward including continued conservative management as well as surgical intervention.  I believe that she would be a candidate for repair of her rotator cuff i.e. supraspinatus/infraspinatus tendons with a possible repair of the subscapularis versus pectoralis tendon transfer.  We had an in-depth discussion about all of this including the natural history of rotator cuff tears and the details of the surgery, risks and benefits of surgery, expected outcome and postoperative recovery course.

## 2024-04-05 ENCOUNTER — TELEPHONE (OUTPATIENT)
Dept: ORTHOPEDIC SURGERY | Age: 49
End: 2024-04-05

## 2024-04-10 ENCOUNTER — TELEPHONE (OUTPATIENT)
Dept: INTERNAL MEDICINE CLINIC | Age: 49
End: 2024-04-10

## 2024-04-10 ENCOUNTER — TELEPHONE (OUTPATIENT)
Dept: ORTHOPEDIC SURGERY | Age: 49
End: 2024-04-10

## 2024-04-10 DIAGNOSIS — M75.102 TEAR OF LEFT ROTATOR CUFF, UNSPECIFIED TEAR EXTENT, UNSPECIFIED WHETHER TRAUMATIC: Primary | ICD-10-CM

## 2024-04-10 NOTE — TELEPHONE ENCOUNTER
68-year-old female with a history of left neck swelling and nodule for about a year. She is recently seen ENT for this and has a CT scan scheduled for next week but last night she began feeling worse with increasing swelling and feeling like her voice was being affected. She spoke with ENT and was told to come to the emergency department for further evaluation. She's never had any other swollen lymph nodes in any other location. She denies any current fevers or chills. No cough or congestion. The history is provided by the patient. Past Medical History:  
Diagnosis Date  
 Hx: UTI (urinary tract infection) 2018  Maternal sickle cell anemia complicating pregnancy, first trimester ([facility]   delivery  Sickle cell disease ([facility] Pt carrier of SC trait Past Surgical History:  
Procedure Laterality Date [facility]  ,   HX CHOLECYSTECTOMY  HX TONSILLECTOMY Family History:  
Problem Relation Age of Onset  No Known Problems Mother  No Known Problems Father  Hypertension Maternal Grandmother  Breast Cancer Paternal Grandmother  Lung Cancer Paternal Grandfather Social History Socioeconomic History  Marital status:  Spouse name: Not on file  Number of children: Not on file  Years of education: Not on file  Highest education level: Not on file Occupational History  Not on file Social Needs  Financial resource strain: Not on file  Food insecurity:  
  Worry: Not on file Inability: Not on file  Transportation needs:  
  Medical: Not on file Non-medical: Not on file Tobacco Use  Smoking status: Never Smoker  Smokeless tobacco: Never Used Substance and Sexual Activity  Alcohol use: No  
 Drug use: No  
 Sexual activity: Yes  
  Partners: Male Lifestyle  Physical activity:  
  Days per week: Not on file Minutes per session: Not on file Medical surgical clearance request received 04/10/24    Surgeon: Dr Dinh    Procedure: Arthroscopy Left shoulder    Date of Procedure: 5/14/2024    Last appt: 6/6/23    Next appt: Visit date not found    PATs received:    [] yes   [x] no       Stress: Not on file Relationships  Social connections:  
  Talks on phone: Not on file Gets together: Not on file Attends [employer] service: Not on file Active member of club or organization: Not on file Attends meetings of clubs or organizations: Not on file Relationship status: Not on file  Intimate partner violence:  
  Fear of current or ex partner: Not on file Emotionally abused: Not on file Physically abused: Not on file Forced sexual activity: Not on file Other Topics Concern [address] Service Not Asked  Blood Transfusions Not Asked  Caffeine Concern Not Asked  Occupational Exposure Not Asked [employer] Hazards Not Asked  Sleep Concern Not Asked  Stress Concern Not Asked  Weight Concern Not Asked  Special Diet Not Asked  Back Care Not Asked  Exercise Not Asked  Bike Helmet Not Asked  Seat Belt Not Asked  Self-Exams Not Asked Social History Narrative  Not on file ALLERGIES: Shrimp and Zithromax [azithromycin] Review of Systems Constitutional: Negative for fever. HENT: Positive for voice change. Negative for congestion, rhinorrhea and trouble swallowing. Respiratory: Negative for cough and shortness of breath. Musculoskeletal: Positive for neck pain. Skin: Negative for rash and wound. Vitals:  
 03/21/19 1057 BP: 129/84 Pulse: 77 Resp: 16 Temp: 98.5 °F (36.9 °C) SpO2: 98% Weight: 96.2 kg (212 lb) Height: 5' 2\" (1.575 m) Physical Exam  
Constitutional: She appears well-developed and well-nourished. HENT:  
Mouth/Throat: Oropharynx is clear and moist.  
Eyes: Pupils are equal, round, and reactive to light. Conjunctivae are normal.  
Neck:  
Limited rotation of the neck to the left secondary to left neck swelling. There is a 2-3 cm nodule in the left neck, well below the mandibular line that is tender to palpation but without warmth or erythema.   Trachea is midline. No other lymphadenopathy noted in the neck. Cardiovascular: Normal rate, regular rhythm and normal heart sounds. Pulmonary/Chest: Effort normal and breath sounds normal.  
Abdominal: Soft. She exhibits no distension. There is no tenderness. Musculoskeletal: Normal range of motion. There is no supraclavicular axillary epitrochlear or inguinal lymphadenopathy present. Neurological: She is alert. Nursing note and vitals reviewed. MDM Number of Diagnoses or Management Options Diagnosis management comments: We will obtain the patient's CT scan today due to worsening symptoms and prolonged duration of this issue. Following CT scan and blood work results I will discuss the results with her ENT, Dr. Scot Walker. Apparently they are aware that she came to the ED today. Review of his notes reveals he is concerned about a congenital cyst of some sort. Hopefully this does not turn out to be malignancy. 2:20 PM 
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 Amount and/or Complexity of Data Reviewed Clinical lab tests: ordered and reviewed (Results for orders placed or performed during the hospital encounter of 05/99/00 
-METABOLIC PANEL, COMPREHENSIVE Result                      Value             Ref Range Sodium                      142               136 - 145 mm* Potassium                   3.8               3.5 - 5.1 mm* Chloride                    107               98 - 107 mmo* CO2                         28                21 - 32 mmol* Anion gap                   7                 mmol/L Glucose                     80                65 - 100 mg/* BUN                         7                 6 - 23 MG/DL Creatinine                  0.57 (L)          0.6 - 1.0 MG* 
     GFR est AA                  >60               >60 ml/min/1* GFR est non-AA              >60               ml/min/1.73m2 Calcium                     9.0               8.3 - 10.4 M* Bilirubin, total            1.0               0.2 - 1.1 MG* ALT (SGPT)                  31                12 - 65 U/L   
     AST (SGOT)                  18                15 - 37 U/L Alk. phosphatase            89                50 - 130 U/L Protein, total              7.2               g/dL Albumin                     3.7               3.5 - 5.0 g/* Globulin                    3.5               2.3 - 3.5 g/* A-G Ratio                   1.1                             
-CBC WITH AUTOMATED DIFF Result                      Value             Ref Range WBC                         9.2               4.3 - 11.1 K* 
     RBC                         5.02              4.05 - 5.2 M* HGB                         13.4              11.7 - 15.4 * HCT                         41.5              35.8 - 46.3 % MCV                         82.7              79.6 - 97.8 * MCH                         26.7              26.1 - 32.9 * MCHC                        32.3              31.4 - 35.0 * RDW                         14.6              11.9 - 14.6 % PLATELET                    257               150 - 450 K/* MPV                         11.3              9.4 - 12.3 FL ABSOLUTE NRBC               0.00              0.0 - 0.2 K/* DF                          AUTOMATED NEUTROPHILS                 72                43 - 78 % LYMPHOCYTES                 16                13 - 44 % MONOCYTES                   9                 4.0 - 12.0 % EOSINOPHILS                 2                 0.5 - 7.8 % BASOPHILS                   1                 0.0 - 2.0 % IMMATURE GRANULOCYTES       0                 0.0 - 5.0 %   
     ABS. NEUTROPHILS            6.6               1.7 - 8.2 K/* ABS. LYMPHOCYTES            1.5               0.5 - 4.6 K/* ABS. MONOCYTES              0.9               0.1 - 1.3 K/* ABS. EOSINOPHILS            0.2               0.0 - 0.8 K/* ABS. BASOPHILS              0.1               0.0 - 0.2 K/* ABS. IMM. GRANS.            0.0               0.0 - 0.5 K/* 
-HCG URINE, QL. - POC Result                      Value             Ref Range Pregnancy test,urine (*     NEGATIVE          NEG           
) Tests in the radiology section of CPT®: ordered and reviewed (Ct Neck Soft Tissue W Cont Result Date: 3/21/2019 CT neck with contrast History: Left-sided neck swelling and nodule x1 year. Technique: Helically acquired images were obtained from above the orbits to the thoracic inlet reconstructed at 3.0 mm thickness after the uneventful administration of 80 mL of intravenous Optiray-350 in order to better evaluate the soft tissue and vascular structures. Coronal reformatted images were submitted. Radiation dose reduction techniques were used for this study:  Our CT scanners use one or all of the following: Automated exposure control, adjustment of the mA and/or kVp according to patient's size, iterative reconstruction. Comparison: None. Findings: The paranasal sinuses are well pneumatized and aerated. The mastoid air cells are clear. The major salivary glands are unremarkable. Corresponding to the area palpable concern on the left are regions of solid and cystic components. These are situated anterior to the sternocleidomastoid muscle. This could represent 1 or 2 separate structures measuring approximately 1.8 x 2.3 x 2.2 cm and 1.8 x 1.5 x 3.1 versus a single lesion measuring nearly 3.7 cm in maximal dimension. There is mild thickening of the adjacent platysma muscle. There is a mildly prominent left level II lymph node posterior to this process measuring approximately 1.3 cm. The major vascular structures are unremarkable.  The thyroid gland is homogeneous. IMPRESSION:  Solid and cystic region appearing regions within the left neck anterior to the sternocleidomastoid muscle and posterior to the angle of the mandible with thickening of the patella but is no muscle. Given the location, this this would be most suggestive of an an usual appearing branchial cleft cyst which may be secondary to superimposed infection. The more solid-appearing components could represent adjacent reactive lymph nodes. Alternatively, cystic adenopathy cannot be entirely excluded. ) Review and summarize past medical records: yes Discuss the patient with other providers: yes (Discussed with the patient's ENT M.D., Dr. Pablito Rodriguez. They will call the patient and see her in follow-up. Antibiotics and steroids recommended.) Procedures

## 2024-04-10 NOTE — TELEPHONE ENCOUNTER
[facility] Paperwork received by [facility].     NATASHA in patients chart.     Paperwork placed on [employer] desk.

## 2024-04-11 NOTE — TELEPHONE ENCOUNTER
Contacted pt to clarify reason for disability paperwork. Pt states that paperwork should be completed for upcoming surgery on 5/14/24. Pt informed that start date of disability will be 5/14/24 and extend to 3 months after surgery. Pt voices understanding. Pt is requesting a call back when paperwork is complete.     CBC and BMP were additionally ordered for pt's upcoming surgery. Pt will perform labwork at [facility] on 4/30/24.

## 2024-05-03 ENCOUNTER — TELEPHONE (OUTPATIENT)
Dept: ORTHOPEDIC SURGERY | Age: 49
End: 2024-05-03

## 2024-05-03 ENCOUNTER — TELEPHONE (OUTPATIENT)
Dept: INTERNAL MEDICINE CLINIC | Age: 49
End: 2024-05-03

## 2024-05-03 NOTE — TELEPHONE ENCOUNTER
Patient called in regards to surgery clearance. Patient has not been seen in over a year. Patient declined appointment that I had today 5/3 due to no transportation. No availability in the office until her scheduled surgery day 5/14. Are you willing to overbook on 5/13 to get patient in?

## 2024-05-03 NOTE — TELEPHONE ENCOUNTER
Spoke with patient and informed her that we are still in need of PCP clearance for her upcoming sx. Patient voiced understanding and was going to contact their office.

## 2024-05-06 ENCOUNTER — OFFICE VISIT (OUTPATIENT)
Dept: ORTHOPEDIC SURGERY | Age: 49
End: 2024-05-06

## 2024-05-06 ENCOUNTER — HOSPITAL ENCOUNTER (OUTPATIENT)
Dept: PREADMISSION TESTING | Age: 49
Discharge: HOME | End: 2024-05-10
Payer: COMMERCIAL

## 2024-05-06 VITALS
OXYGEN SATURATION: 98 % | BODY MASS INDEX: 41 KG/M2 | SYSTOLIC BLOOD PRESSURE: 132 MMHG | HEIGHT: 62 IN | WEIGHT: 222.8 LBS | HEART RATE: 72 BPM | DIASTOLIC BLOOD PRESSURE: 94 MMHG | RESPIRATION RATE: 16 BRPM

## 2024-05-06 DIAGNOSIS — M75.102 TEAR OF LEFT ROTATOR CUFF, UNSPECIFIED TEAR EXTENT, UNSPECIFIED WHETHER TRAUMATIC: Primary | ICD-10-CM

## 2024-05-06 LAB
ANION GAP SERPL CALCULATED.3IONS-SCNC: 7 MMOL/L (ref 9–17)
BUN SERPL-MCNC: 11 MG/DL (ref 6–20)
CALCIUM SERPL-MCNC: 9.4 MG/DL (ref 8.6–10.4)
CHLORIDE SERPL-SCNC: 104 MMOL/L (ref 98–107)
CO2 SERPL-SCNC: 29 MMOL/L (ref 20–31)
CREAT SERPL-MCNC: 0.7 MG/DL (ref 0.5–0.9)
ERYTHROCYTE [DISTWIDTH] IN BLOOD BY AUTOMATED COUNT: 13.2 % (ref 12.5–15.4)
GFR, ESTIMATED: >90 ML/MIN/1.73M2
GLUCOSE SERPL-MCNC: 107 MG/DL (ref 70–99)
HCT VFR BLD AUTO: 39.2 % (ref 36–46)
HGB BLD-MCNC: 12.9 G/DL (ref 12–16)
MCH RBC QN AUTO: 30 PG (ref 26–34)
MCHC RBC AUTO-ENTMCNC: 32.9 G/DL (ref 31–37)
MCV RBC AUTO: 91.4 FL (ref 80–100)
PLATELET # BLD AUTO: 325 K/UL (ref 140–450)
PMV BLD AUTO: 7.8 FL (ref 6–12)
POTASSIUM SERPL-SCNC: 4.3 MMOL/L (ref 3.7–5.3)
RBC # BLD AUTO: 4.28 M/UL (ref 4–5.2)
SODIUM SERPL-SCNC: 140 MMOL/L (ref 135–144)
WBC OTHER # BLD: 8.1 K/UL (ref 3.5–11)

## 2024-05-06 PROCEDURE — PREOPEXAM PRE-OP EXAM: Performed by: ORTHOPAEDIC SURGERY

## 2024-05-06 PROCEDURE — 80048 BASIC METABOLIC PNL TOTAL CA: CPT

## 2024-05-06 PROCEDURE — 85027 COMPLETE CBC AUTOMATED: CPT

## 2024-05-06 PROCEDURE — 93005 ELECTROCARDIOGRAM TRACING: CPT

## 2024-05-06 PROCEDURE — 36415 COLL VENOUS BLD VENIPUNCTURE: CPT

## 2024-05-06 RX ORDER — ACETAMINOPHEN 325 MG/1
1000 TABLET ORAL ONCE
OUTPATIENT
Start: 2024-05-06 | End: 2024-05-06

## 2024-05-06 RX ORDER — SODIUM CHLORIDE 0.9 % (FLUSH) 0.9 %
5-40 SYRINGE (ML) INJECTION EVERY 12 HOURS SCHEDULED
OUTPATIENT
Start: 2024-05-06

## 2024-05-06 RX ORDER — SODIUM CHLORIDE 0.9 % (FLUSH) 0.9 %
5-40 SYRINGE (ML) INJECTION PRN
OUTPATIENT
Start: 2024-05-06

## 2024-05-06 RX ORDER — SODIUM CHLORIDE 9 MG/ML
INJECTION, SOLUTION INTRAVENOUS PRN
OUTPATIENT
Start: 2024-05-06

## 2024-05-06 RX ORDER — CALCIUM CARBONATE 500 MG/1
1 TABLET, CHEWABLE ORAL AS NEEDED
COMMUNITY

## 2024-05-06 RX ORDER — GABAPENTIN 300 MG/1
300 CAPSULE ORAL DAILY
Qty: 7 CAPSULE | Refills: 0 | Status: SHIPPED | OUTPATIENT
Start: 2024-05-06 | End: 2024-05-13

## 2024-05-06 RX ORDER — ONDANSETRON 4 MG/1
4 TABLET, FILM COATED ORAL DAILY PRN
Qty: 20 TABLET | Refills: 0 | Status: SHIPPED | OUTPATIENT
Start: 2024-05-06

## 2024-05-06 RX ORDER — HYDROCODONE BITARTRATE AND ACETAMINOPHEN 5; 325 MG/1; MG/1
1 TABLET ORAL EVERY 4 HOURS PRN
Qty: 42 TABLET | Refills: 0 | Status: SHIPPED | OUTPATIENT
Start: 2024-05-06 | End: 2024-05-13

## 2024-05-06 ASSESSMENT — PAIN DESCRIPTION - FREQUENCY: FREQUENCY: INTERMITTENT

## 2024-05-06 ASSESSMENT — PAIN DESCRIPTION - ONSET: ONSET: AWAKENED FROM SLEEP

## 2024-05-06 ASSESSMENT — PAIN DESCRIPTION - ORIENTATION: ORIENTATION: LEFT

## 2024-05-06 ASSESSMENT — PAIN SCALES - GENERAL: PAINLEVEL_OUTOF10: 0

## 2024-05-06 ASSESSMENT — PAIN DESCRIPTION - LOCATION: LOCATION: SHOULDER

## 2024-05-06 ASSESSMENT — PAIN DESCRIPTION - PAIN TYPE: TYPE: CHRONIC PAIN

## 2024-05-06 NOTE — PROGRESS NOTES
ORTHOPEDIC PATIENT EVALUATION      HPI / Chief Complaint  Annalisa Claire is a 48 y.o. female who presents for operative visit regarding the left shoulder.  She has a full-thickness rotator cuff tear and has failed attempts at conservative management with therapy and use of anti-inflammatories as well as a cortisone injection.    Past Medical History  Annalisa  has a past medical history of Anxiety, Lumbar disc disease, Obesity (BMI 30-39.9), and Rotator cuff tear.    Past Surgical History  Annalisa  has a past surgical history that includes Lumbar disc surgery; Shoulder arthroscopy (Right, 02/05/2020); Shoulder arthroscopy (Right, 02/05/2020); Colonoscopy (N/A, 06/09/2023); and back surgery.    Current Medications  Current Outpatient Medications   Medication Sig Dispense Refill    HYDROcodone-acetaminophen (NORCO) 5-325 MG per tablet Take 1 tablet by mouth every 4 hours as needed for Pain for up to 7 days. Max Daily Amount: 6 tablets 42 tablet 0    gabapentin (NEURONTIN) 300 MG capsule Take 1 capsule by mouth daily for 7 doses. 7 capsule 0    ondansetron (ZOFRAN) 4 MG tablet Take 1 tablet by mouth daily as needed for Nausea or Vomiting 20 tablet 0    diclofenac sodium (VOLTAREN) 1 % GEL Apply topically 2 times daily      calcium carbonate (TUMS) 500 MG chewable tablet Take 1 tablet by mouth as needed for Heartburn      diclofenac (VOLTAREN) 75 MG EC tablet Take 1 tablet by mouth 2 times daily (with meals) for 14 days 28 tablet 0    naproxen (EC NAPROSYN) 500 MG EC tablet Take 1 tablet by mouth 2 times daily as needed for Pain (Patient not taking: Reported on 1/4/2024) 60 tablet 0    polyethylene glycol (GLYCOLAX) 17 GM/SCOOP powder Please follow instructions provided to you by your provider. (Patient not taking: Reported on 1/4/2024) 238 g 0    bisacodyl 5 MG EC tablet Please follow instructions given to you by your provider. (Patient not taking: Reported on 1/4/2024) 4 tablet 0    topiramate (TOPAMAX) 50 MG tablet Take 1

## 2024-05-06 NOTE — DISCHARGE INSTRUCTIONS
CALLED [facility] AND GAVE REPORT TO CARLOS DANIELS REGARDING THE PT, INFORMED CARLOS DANIELS THAT PT WILL BE PLACE IN -A UNDER THE SERVICE OF DR. JIMMY QURESHI. Preoperative Instructions:    Stop eating solid foods at midnight the night prior to your surgery.     Stop drinking clear liquids at midnight the night prior to your surgery.    Arrive at the surgery center (3rd entrance) on ___1-01-51____________ by _____0700am __________.     Please stop any blood thinning medications as directed by your surgeon or prescribing physician. Failure to stop certain medications may interfere with your scheduled surgery. These may include: Aspirin, Coumadin, Plavix, NSAIDS (Motrin, Aleve, Advil, Mobic, Celebrex), Eliquis, Pradaxa, Xarelto, Fish oil, and herbal supplements.     You may continue the rest of your medications through the night before surgery unless instructed otherwise.     Day of surgery please take only the following medication(s) with a small sip of water: None      Please  shower with provided CHG soap the day before and the morning of  the day of surgery.      Reminders:  -If you are going home the day of your procedure, you will need a family member or friend to stay during the procedure and drive you home after your procedure. Your  must be 18 years of age or older and able to sign off on your discharge instructions.    -If you are going home the same day of your surgery, someone must remain with you for the first 24 hours after your surgery if you receive sedation or anesthesia.     -Please do not wear any jewelery or body ( ex. nasal)  piercing the day of surgery

## 2024-05-07 LAB
EKG ATRIAL RATE: 60 BPM
EKG ATRIAL RATE: 62 BPM
EKG P AXIS: 60 DEGREES
EKG P AXIS: 65 DEGREES
EKG P-R INTERVAL: 154 MS
EKG P-R INTERVAL: 164 MS
EKG Q-T INTERVAL: 412 MS
EKG Q-T INTERVAL: 416 MS
EKG QRS DURATION: 90 MS
EKG QRS DURATION: 98 MS
EKG QTC CALCULATION (BAZETT): 416 MS
EKG QTC CALCULATION (BAZETT): 418 MS
EKG R AXIS: -62 DEGREES
EKG R AXIS: -67 DEGREES
EKG T AXIS: 54 DEGREES
EKG T AXIS: 59 DEGREES
EKG VENTRICULAR RATE: 60 BPM
EKG VENTRICULAR RATE: 62 BPM

## 2024-05-10 ENCOUNTER — ANESTHESIA EVENT (OUTPATIENT)
Dept: OPERATING ROOM | Age: 49
End: 2024-05-10
Payer: COMMERCIAL

## 2024-05-13 ENCOUNTER — OFFICE VISIT (OUTPATIENT)
Dept: INTERNAL MEDICINE CLINIC | Age: 49
End: 2024-05-13
Payer: COMMERCIAL

## 2024-05-13 VITALS
OXYGEN SATURATION: 98 % | BODY MASS INDEX: 41.04 KG/M2 | DIASTOLIC BLOOD PRESSURE: 86 MMHG | HEIGHT: 62 IN | SYSTOLIC BLOOD PRESSURE: 128 MMHG | HEART RATE: 70 BPM | WEIGHT: 223 LBS

## 2024-05-13 DIAGNOSIS — S46.002A INJURY OF LEFT ROTATOR CUFF, INITIAL ENCOUNTER: Primary | ICD-10-CM

## 2024-05-13 DIAGNOSIS — E66.01 MORBIDLY OBESE (HCC): ICD-10-CM

## 2024-05-13 DIAGNOSIS — F33.41 RECURRENT MAJOR DEPRESSIVE DISORDER, IN PARTIAL REMISSION (HCC): ICD-10-CM

## 2024-05-13 DIAGNOSIS — M75.21 RIGHT BICIPITAL TENOSYNOVITIS: ICD-10-CM

## 2024-05-13 PROCEDURE — 99213 OFFICE O/P EST LOW 20 MIN: CPT | Performed by: INTERNAL MEDICINE

## 2024-05-13 SDOH — ECONOMIC STABILITY: FOOD INSECURITY: WITHIN THE PAST 12 MONTHS, THE FOOD YOU BOUGHT JUST DIDN'T LAST AND YOU DIDN'T HAVE MONEY TO GET MORE.: NEVER TRUE

## 2024-05-13 SDOH — ECONOMIC STABILITY: FOOD INSECURITY: WITHIN THE PAST 12 MONTHS, YOU WORRIED THAT YOUR FOOD WOULD RUN OUT BEFORE YOU GOT MONEY TO BUY MORE.: NEVER TRUE

## 2024-05-13 ASSESSMENT — PATIENT HEALTH QUESTIONNAIRE - PHQ9
SUM OF ALL RESPONSES TO PHQ QUESTIONS 1-9: 1
8. MOVING OR SPEAKING SO SLOWLY THAT OTHER PEOPLE COULD HAVE NOTICED. OR THE OPPOSITE, BEING SO FIGETY OR RESTLESS THAT YOU HAVE BEEN MOVING AROUND A LOT MORE THAN USUAL: NOT AT ALL
10. IF YOU CHECKED OFF ANY PROBLEMS, HOW DIFFICULT HAVE THESE PROBLEMS MADE IT FOR YOU TO DO YOUR WORK, TAKE CARE OF THINGS AT HOME, OR GET ALONG WITH OTHER PEOPLE: NOT DIFFICULT AT ALL
SUM OF ALL RESPONSES TO PHQ9 QUESTIONS 1 & 2: 1
SUM OF ALL RESPONSES TO PHQ QUESTIONS 1-9: 1
1. LITTLE INTEREST OR PLEASURE IN DOING THINGS: NOT AT ALL
SUM OF ALL RESPONSES TO PHQ QUESTIONS 1-9: 1
6. FEELING BAD ABOUT YOURSELF - OR THAT YOU ARE A FAILURE OR HAVE LET YOURSELF OR YOUR FAMILY DOWN: NOT AT ALL
SUM OF ALL RESPONSES TO PHQ QUESTIONS 1-9: 1
4. FEELING TIRED OR HAVING LITTLE ENERGY: NOT AT ALL
3. TROUBLE FALLING OR STAYING ASLEEP: NOT AT ALL
5. POOR APPETITE OR OVEREATING: NOT AT ALL
2. FEELING DOWN, DEPRESSED OR HOPELESS: SEVERAL DAYS
7. TROUBLE CONCENTRATING ON THINGS, SUCH AS READING THE NEWSPAPER OR WATCHING TELEVISION: NOT AT ALL
9. THOUGHTS THAT YOU WOULD BE BETTER OFF DEAD, OR OF HURTING YOURSELF: NOT AT ALL

## 2024-05-13 ASSESSMENT — ENCOUNTER SYMPTOMS
ABDOMINAL DISTENTION: 0
WHEEZING: 0
BLOOD IN STOOL: 0
COLOR CHANGE: 0
DIARRHEA: 0
EYE PAIN: 0
EYE DISCHARGE: 0
SHORTNESS OF BREATH: 0
TROUBLE SWALLOWING: 0
COUGH: 0

## 2024-05-13 NOTE — PROGRESS NOTES
[facility]  [address] [address]  Dept: [phone]  Dept Fax: [phone]    Annalisa Claire is a 48 y.o. female who presents today for her medical conditions/complaintsas noted below.  Annalisa Claire is c/o of   Chief Complaint   Patient presents with    surgery clearance     Left shoulder surgery         HPI:     Due for left rotaor cuff surg  Tear of supraspinatus full thickness and old subscapularis full thickeness since 2013  Also some bicepts long head tear  Pain what limitation of movement          Hemoglobin A1C (%)   Date Value   01/24/2020 5.5             ( goal A1Cis < 7)   No components found for: \"LABMICR\"  No components found for: \"LDLCHOLESTEROL\", \"LDLCALC\"    (goal LDL is <100)   AST (U/L)   Date Value   06/06/2023 18     ALT (U/L)   Date Value   06/06/2023 18     BUN (mg/dL)   Date Value   05/06/2024 11     BP Readings from Last 3 Encounters:   05/13/24 128/86   05/06/24 (!) 132/94   06/09/23 (!) 155/99          (goal 120/80)    Past Medical History:   Diagnosis Date    Anxiety 5/3/2013    NO MED STOPPED [facility] DID NOT LIKE SIDE EFFECTS TO FOLLOW WITH DR FOR ALTERNATE MED    Lumbar disc disease     Obesity (BMI 30-39.9) 1/22/2020    Rotator cuff tear 01/19/2013    TO HAVE SURGERY 07/2013, TEAR HAPPENED WHEN BENCH PRESSING WEIGHTS      Past Surgical History:   Procedure Laterality Date    BACK SURGERY      COLONOSCOPY N/A 06/09/2023    COLONOSCOPY POLYPECTOMY SNARE/HOT BIOPSY performed by Anthony Grove MD at [facility] END[facility]    LUMBAR DISC SURGERY      L5 herniated disc    SHOULDER ARTHROSCOPY Right 02/05/2020    SHOULDER ARTHROSCOPY ROTATOR CUFF REPAIR, W/INTERSCALENE BLOCK & EXPAREL performed by Braxton Dinh MD at [facility] OR    SHOULDER ARTHROSCOPY Right 02/05/2020    SUBPECTORAL BICEP TENODESIS performed by Braxton Dinh MD at [facility] OR       Family History   Problem Relation Age of Onset    High Blood Pressure Mother     High Cholesterol Father

## 2024-05-14 ENCOUNTER — HOSPITAL ENCOUNTER (OUTPATIENT)
Age: 49
Setting detail: OUTPATIENT SURGERY
Discharge: HOME | End: 2024-05-14
Attending: ORTHOPAEDIC SURGERY | Admitting: ORTHOPAEDIC SURGERY
Payer: COMMERCIAL

## 2024-05-14 ENCOUNTER — ANESTHESIA (OUTPATIENT)
Dept: OPERATING ROOM | Age: 49
End: 2024-05-14
Payer: COMMERCIAL

## 2024-05-14 VITALS
RESPIRATION RATE: 21 BRPM | TEMPERATURE: 97.3 F | WEIGHT: 222 LBS | BODY MASS INDEX: 40.85 KG/M2 | OXYGEN SATURATION: 96 % | HEIGHT: 62 IN | SYSTOLIC BLOOD PRESSURE: 130 MMHG | HEART RATE: 84 BPM | DIASTOLIC BLOOD PRESSURE: 83 MMHG

## 2024-05-14 LAB — HCG, PREGNANCY URINE (POC): NEGATIVE

## 2024-05-14 PROCEDURE — 2500000003 HC RX 250 WO HCPCS: Performed by: NURSE ANESTHETIST, CERTIFIED REGISTERED

## 2024-05-14 PROCEDURE — 2709999900 HC NON-CHARGEABLE SUPPLY: Performed by: ORTHOPAEDIC SURGERY

## 2024-05-14 PROCEDURE — 2580000003 HC RX 258: Performed by: ORTHOPAEDIC SURGERY

## 2024-05-14 PROCEDURE — 6360000002 HC RX W HCPCS: Performed by: NURSE ANESTHETIST, CERTIFIED REGISTERED

## 2024-05-14 PROCEDURE — 64415 NJX AA&/STRD BRCH PLXS IMG: CPT | Performed by: STUDENT IN AN ORGANIZED HEALTH CARE EDUCATION/TRAINING PROGRAM

## 2024-05-14 PROCEDURE — 29827 SHO ARTHRS SRG RT8TR CUF RPR: CPT | Performed by: ORTHOPAEDIC SURGERY

## 2024-05-14 PROCEDURE — 81025 URINE PREGNANCY TEST: CPT

## 2024-05-14 PROCEDURE — 7100000000 HC PACU RECOVERY - FIRST 15 MIN: Performed by: ORTHOPAEDIC SURGERY

## 2024-05-14 PROCEDURE — 29827 SHO ARTHRS SRG RT8TR CUF RPR: CPT

## 2024-05-14 PROCEDURE — 6370000000 HC RX 637 (ALT 250 FOR IP)

## 2024-05-14 PROCEDURE — 3700000001 HC ADD 15 MINUTES (ANESTHESIA): Performed by: ORTHOPAEDIC SURGERY

## 2024-05-14 PROCEDURE — 6360000002 HC RX W HCPCS: Performed by: ORTHOPAEDIC SURGERY

## 2024-05-14 PROCEDURE — 2720000010 HC SURG SUPPLY STERILE: Performed by: ORTHOPAEDIC SURGERY

## 2024-05-14 PROCEDURE — C1713 ANCHOR/SCREW BN/BN,TIS/BN: HCPCS | Performed by: ORTHOPAEDIC SURGERY

## 2024-05-14 PROCEDURE — 3600000014 HC SURGERY LEVEL 4 ADDTL 15MIN: Performed by: ORTHOPAEDIC SURGERY

## 2024-05-14 PROCEDURE — 3600000004 HC SURGERY LEVEL 4 BASE: Performed by: ORTHOPAEDIC SURGERY

## 2024-05-14 PROCEDURE — C9290 INJ, BUPIVACAINE LIPOSOME: HCPCS | Performed by: STUDENT IN AN ORGANIZED HEALTH CARE EDUCATION/TRAINING PROGRAM

## 2024-05-14 PROCEDURE — 7100000010 HC PHASE II RECOVERY - FIRST 15 MIN: Performed by: ORTHOPAEDIC SURGERY

## 2024-05-14 PROCEDURE — 6360000002 HC RX W HCPCS

## 2024-05-14 PROCEDURE — 3700000000 HC ANESTHESIA ATTENDED CARE: Performed by: ORTHOPAEDIC SURGERY

## 2024-05-14 PROCEDURE — 7100000001 HC PACU RECOVERY - ADDTL 15 MIN: Performed by: ORTHOPAEDIC SURGERY

## 2024-05-14 PROCEDURE — 6360000002 HC RX W HCPCS: Performed by: STUDENT IN AN ORGANIZED HEALTH CARE EDUCATION/TRAINING PROGRAM

## 2024-05-14 PROCEDURE — 2500000003 HC RX 250 WO HCPCS: Performed by: ORTHOPAEDIC SURGERY

## 2024-05-14 PROCEDURE — 7100000011 HC PHASE II RECOVERY - ADDTL 15 MIN: Performed by: ORTHOPAEDIC SURGERY

## 2024-05-14 DEVICE — ANCHOR SUTURE BIOCOMP 4.75X19.1 MM SWIVELOCK C: Type: IMPLANTABLE DEVICE | Site: SHOULDER | Status: FUNCTIONAL

## 2024-05-14 DEVICE — ANCHOR BONE SP FBRTAK RC TGRTPE WH/BLK: Type: IMPLANTABLE DEVICE | Site: SHOULDER | Status: FUNCTIONAL

## 2024-05-14 DEVICE — ANCHOR BONE SP FBRTAK RC TGRTPE BLU: Type: IMPLANTABLE DEVICE | Site: SHOULDER | Status: FUNCTIONAL

## 2024-05-14 DEVICE — ANCHOR SUTURE L 24.5 MM DIA 4.75 MM SUTURE SZ 2 B-TCP/ PEEK: Type: IMPLANTABLE DEVICE | Site: SHOULDER | Status: FUNCTIONAL

## 2024-05-14 DEVICE — ANCHOR SUT L14.7MM DIA5.5MM BIOCOMPOSITE FULL THRD W/ 1.3MM: Type: IMPLANTABLE DEVICE | Site: SHOULDER | Status: FUNCTIONAL

## 2024-05-14 RX ORDER — NALOXONE HYDROCHLORIDE 0.4 MG/ML
INJECTION, SOLUTION INTRAMUSCULAR; INTRAVENOUS; SUBCUTANEOUS PRN
Status: DISCONTINUED | OUTPATIENT
Start: 2024-05-14 | End: 2024-05-14 | Stop reason: HOSPADM

## 2024-05-14 RX ORDER — FENTANYL CITRATE 50 UG/ML
INJECTION, SOLUTION INTRAMUSCULAR; INTRAVENOUS
Status: COMPLETED
Start: 2024-05-14 | End: 2024-05-14

## 2024-05-14 RX ORDER — MIDAZOLAM HYDROCHLORIDE 2 MG/2ML
2 INJECTION, SOLUTION INTRAMUSCULAR; INTRAVENOUS
Status: COMPLETED | OUTPATIENT
Start: 2024-05-14 | End: 2024-05-14

## 2024-05-14 RX ORDER — SODIUM CHLORIDE 0.9 % (FLUSH) 0.9 %
5-40 SYRINGE (ML) INJECTION EVERY 12 HOURS SCHEDULED
Status: DISCONTINUED | OUTPATIENT
Start: 2024-05-14 | End: 2024-05-14 | Stop reason: HOSPADM

## 2024-05-14 RX ORDER — SODIUM CHLORIDE, SODIUM LACTATE, POTASSIUM CHLORIDE, CALCIUM CHLORIDE 600; 310; 30; 20 MG/100ML; MG/100ML; MG/100ML; MG/100ML
INJECTION, SOLUTION INTRAVENOUS CONTINUOUS PRN
Status: COMPLETED | OUTPATIENT
Start: 2024-05-14 | End: 2024-05-14

## 2024-05-14 RX ORDER — EPINEPHRINE 1 MG/ML
INJECTION, SOLUTION INTRAMUSCULAR; SUBCUTANEOUS
Status: DISCONTINUED
Start: 2024-05-14 | End: 2024-05-14 | Stop reason: HOSPADM

## 2024-05-14 RX ORDER — HYDROCODONE BITARTRATE AND ACETAMINOPHEN 5; 325 MG/1; MG/1
1 TABLET ORAL ONCE
Status: COMPLETED | OUTPATIENT
Start: 2024-05-14 | End: 2024-05-14

## 2024-05-14 RX ORDER — SODIUM CHLORIDE 0.9 % (FLUSH) 0.9 %
5-40 SYRINGE (ML) INJECTION PRN
Status: DISCONTINUED | OUTPATIENT
Start: 2024-05-14 | End: 2024-05-14 | Stop reason: HOSPADM

## 2024-05-14 RX ORDER — KETOROLAC TROMETHAMINE 30 MG/ML
INJECTION, SOLUTION INTRAMUSCULAR; INTRAVENOUS PRN
Status: DISCONTINUED | OUTPATIENT
Start: 2024-05-14 | End: 2024-05-14 | Stop reason: SDUPTHER

## 2024-05-14 RX ORDER — HYDRALAZINE HYDROCHLORIDE 20 MG/ML
10 INJECTION INTRAMUSCULAR; INTRAVENOUS
Status: DISCONTINUED | OUTPATIENT
Start: 2024-05-14 | End: 2024-05-14 | Stop reason: HOSPADM

## 2024-05-14 RX ORDER — ONDANSETRON 2 MG/ML
INJECTION INTRAMUSCULAR; INTRAVENOUS PRN
Status: DISCONTINUED | OUTPATIENT
Start: 2024-05-14 | End: 2024-05-14 | Stop reason: SDUPTHER

## 2024-05-14 RX ORDER — HYDROCODONE BITARTRATE AND ACETAMINOPHEN 5; 325 MG/1; MG/1
TABLET ORAL
Status: COMPLETED
Start: 2024-05-14 | End: 2024-05-14

## 2024-05-14 RX ORDER — DEXAMETHASONE SODIUM PHOSPHATE 10 MG/ML
10 INJECTION, SOLUTION INTRAMUSCULAR; INTRAVENOUS ONCE
Status: DISCONTINUED | OUTPATIENT
Start: 2024-05-14 | End: 2024-05-14 | Stop reason: HOSPADM

## 2024-05-14 RX ORDER — DEXAMETHASONE SODIUM PHOSPHATE 10 MG/ML
INJECTION, SOLUTION INTRAMUSCULAR; INTRAVENOUS PRN
Status: DISCONTINUED | OUTPATIENT
Start: 2024-05-14 | End: 2024-05-14 | Stop reason: SDUPTHER

## 2024-05-14 RX ORDER — CEFAZOLIN 2 G/1
INJECTION, POWDER, FOR SOLUTION INTRAMUSCULAR; INTRAVENOUS
Status: DISCONTINUED
Start: 2024-05-14 | End: 2024-05-14 | Stop reason: HOSPADM

## 2024-05-14 RX ORDER — PHENYLEPHRINE HCL IN 0.9% NACL 1 MG/10 ML
SYRINGE (ML) INTRAVENOUS PRN
Status: DISCONTINUED | OUTPATIENT
Start: 2024-05-14 | End: 2024-05-14 | Stop reason: SDUPTHER

## 2024-05-14 RX ORDER — SODIUM CHLORIDE 9 MG/ML
INJECTION, SOLUTION INTRAVENOUS PRN
Status: DISCONTINUED | OUTPATIENT
Start: 2024-05-14 | End: 2024-05-14 | Stop reason: HOSPADM

## 2024-05-14 RX ORDER — SCOLOPAMINE TRANSDERMAL SYSTEM 1 MG/1
1 PATCH, EXTENDED RELEASE TRANSDERMAL
Status: DISCONTINUED | OUTPATIENT
Start: 2024-05-14 | End: 2024-05-14 | Stop reason: HOSPADM

## 2024-05-14 RX ORDER — ROCURONIUM BROMIDE 10 MG/ML
INJECTION, SOLUTION INTRAVENOUS PRN
Status: DISCONTINUED | OUTPATIENT
Start: 2024-05-14 | End: 2024-05-14 | Stop reason: SDUPTHER

## 2024-05-14 RX ORDER — FENTANYL CITRATE 50 UG/ML
100 INJECTION, SOLUTION INTRAMUSCULAR; INTRAVENOUS
Status: COMPLETED | OUTPATIENT
Start: 2024-05-14 | End: 2024-05-14

## 2024-05-14 RX ORDER — MIDAZOLAM HYDROCHLORIDE 1 MG/ML
INJECTION INTRAMUSCULAR; INTRAVENOUS
Status: COMPLETED
Start: 2024-05-14 | End: 2024-05-14

## 2024-05-14 RX ORDER — LABETALOL HYDROCHLORIDE 5 MG/ML
10 INJECTION, SOLUTION INTRAVENOUS
Status: DISCONTINUED | OUTPATIENT
Start: 2024-05-14 | End: 2024-05-14 | Stop reason: HOSPADM

## 2024-05-14 RX ORDER — ACETAMINOPHEN 500 MG
1000 TABLET ORAL ONCE
Status: COMPLETED | OUTPATIENT
Start: 2024-05-14 | End: 2024-05-14

## 2024-05-14 RX ORDER — PROPOFOL 10 MG/ML
INJECTION, EMULSION INTRAVENOUS PRN
Status: DISCONTINUED | OUTPATIENT
Start: 2024-05-14 | End: 2024-05-14 | Stop reason: SDUPTHER

## 2024-05-14 RX ORDER — LIDOCAINE HYDROCHLORIDE 10 MG/ML
INJECTION, SOLUTION INFILTRATION; PERINEURAL PRN
Status: DISCONTINUED | OUTPATIENT
Start: 2024-05-14 | End: 2024-05-14 | Stop reason: SDUPTHER

## 2024-05-14 RX ORDER — ACETAMINOPHEN 500 MG
TABLET ORAL
Status: COMPLETED
Start: 2024-05-14 | End: 2024-05-14

## 2024-05-14 RX ORDER — BUPIVACAINE HYDROCHLORIDE 5 MG/ML
INJECTION, SOLUTION EPIDURAL; INTRACAUDAL
Status: COMPLETED | OUTPATIENT
Start: 2024-05-14 | End: 2024-05-14

## 2024-05-14 RX ORDER — PROCHLORPERAZINE EDISYLATE 5 MG/ML
5 INJECTION INTRAMUSCULAR; INTRAVENOUS
Status: DISCONTINUED | OUTPATIENT
Start: 2024-05-14 | End: 2024-05-14 | Stop reason: HOSPADM

## 2024-05-14 RX ORDER — SCOLOPAMINE TRANSDERMAL SYSTEM 1 MG/1
PATCH, EXTENDED RELEASE TRANSDERMAL
Status: DISCONTINUED
Start: 2024-05-14 | End: 2024-05-14 | Stop reason: HOSPADM

## 2024-05-14 RX ADMIN — SODIUM CHLORIDE: 9 INJECTION, SOLUTION INTRAVENOUS at 09:27

## 2024-05-14 RX ADMIN — HYDROCODONE BITARTRATE AND ACETAMINOPHEN 1 TABLET: 5; 325 TABLET ORAL at 13:44

## 2024-05-14 RX ADMIN — LIDOCAINE HYDROCHLORIDE 40 MG: 10 INJECTION, SOLUTION INFILTRATION; PERINEURAL at 09:30

## 2024-05-14 RX ADMIN — FENTANYL CITRATE 100 MCG: 50 INJECTION, SOLUTION INTRAMUSCULAR; INTRAVENOUS at 08:35

## 2024-05-14 RX ADMIN — Medication 1000 MG: at 08:25

## 2024-05-14 RX ADMIN — Medication 200 MCG: at 10:02

## 2024-05-14 RX ADMIN — KETOROLAC TROMETHAMINE 30 MG: 30 INJECTION, SOLUTION INTRAMUSCULAR; INTRAVENOUS at 12:50

## 2024-05-14 RX ADMIN — SODIUM CHLORIDE: 9 INJECTION, SOLUTION INTRAVENOUS at 11:43

## 2024-05-14 RX ADMIN — Medication 200 MCG: at 09:49

## 2024-05-14 RX ADMIN — BUPIVACAINE HYDROCHLORIDE 20 ML: 5 INJECTION, SOLUTION EPIDURAL; INTRACAUDAL; PERINEURAL at 08:37

## 2024-05-14 RX ADMIN — ROCURONIUM BROMIDE 10 MG: 10 SOLUTION INTRAVENOUS at 11:09

## 2024-05-14 RX ADMIN — SUGAMMADEX 200 MG: 100 INJECTION, SOLUTION INTRAVENOUS at 12:48

## 2024-05-14 RX ADMIN — Medication 200 MCG: at 10:28

## 2024-05-14 RX ADMIN — PROPOFOL 50 MG: 10 INJECTION, EMULSION INTRAVENOUS at 11:09

## 2024-05-14 RX ADMIN — ONDANSETRON 4 MG: 2 INJECTION INTRAMUSCULAR; INTRAVENOUS at 09:36

## 2024-05-14 RX ADMIN — MIDAZOLAM HYDROCHLORIDE 2 MG: 2 INJECTION, SOLUTION INTRAMUSCULAR; INTRAVENOUS at 08:35

## 2024-05-14 RX ADMIN — CEFAZOLIN 2000 MG: 2 INJECTION, POWDER, FOR SOLUTION INTRAMUSCULAR; INTRAVENOUS at 09:36

## 2024-05-14 RX ADMIN — DEXAMETHASONE SODIUM PHOSPHATE 10 MG: 10 INJECTION, SOLUTION INTRAMUSCULAR; INTRAVENOUS at 09:36

## 2024-05-14 RX ADMIN — MIDAZOLAM HYDROCHLORIDE 2 MG: 1 INJECTION, SOLUTION INTRAMUSCULAR; INTRAVENOUS at 08:35

## 2024-05-14 RX ADMIN — BUPIVACAINE 10 ML: 13.3 INJECTION, SUSPENSION, LIPOSOMAL INFILTRATION at 08:37

## 2024-05-14 RX ADMIN — ACETAMINOPHEN 1000 MG: 500 TABLET ORAL at 08:25

## 2024-05-14 RX ADMIN — PROPOFOL 200 MG: 10 INJECTION, EMULSION INTRAVENOUS at 09:30

## 2024-05-14 RX ADMIN — ROCURONIUM BROMIDE 50 MG: 10 SOLUTION INTRAVENOUS at 09:30

## 2024-05-14 ASSESSMENT — PAIN DESCRIPTION - ORIENTATION: ORIENTATION: LEFT

## 2024-05-14 ASSESSMENT — PAIN - FUNCTIONAL ASSESSMENT
PAIN_FUNCTIONAL_ASSESSMENT: NONE - DENIES PAIN
PAIN_FUNCTIONAL_ASSESSMENT: 0-10

## 2024-05-14 ASSESSMENT — PAIN SCALES - GENERAL: PAINLEVEL_OUTOF10: 3

## 2024-05-14 ASSESSMENT — PAIN DESCRIPTION - DESCRIPTORS: DESCRIPTORS: ACHING

## 2024-05-14 ASSESSMENT — PAIN DESCRIPTION - LOCATION: LOCATION: SHOULDER

## 2024-05-14 NOTE — ANESTHESIA PROCEDURE NOTES
Peripheral Block    Patient location during procedure: pre-op  Reason for block: post-op pain management and at surgeon's request  Start time: 5/14/2024 8:37 AM  End time: 5/14/2024 8:39 AM  Staffing  Performed: anesthesiologist   Anesthesiologist: Dilma Stock MD  Performed by: Dilma Stock MD  Authorized by: Dilma Stock MD    Preanesthetic Checklist  Completed: patient identified, IV checked, site marked, risks and benefits discussed, surgical/procedural consents, equipment checked, pre-op evaluation, timeout performed, anesthesia consent given, oxygen available, monitors applied/VS acknowledged, fire risk safety assessment completed and verbalized and blood product R/B/A discussed and consented  Peripheral Block   Patient position: supine  Prep: ChloraPrep  Provider prep: sterile gloves and mask  Patient monitoring: continuous pulse ox, continuous capnometry, frequent blood pressure checks, IV access, oxygen, responsive to questions and cardiac monitor  Block type: Brachial plexus  Interscalene  Laterality: left  Injection technique: single-shot  Guidance: paresthesia technique and ultrasound guided    Needle   Needle type: insulated echogenic nerve stimulator needle   Needle gauge: 21 G  Needle localization: anatomical landmarks, ultrasound guidance and paresthesias  Needle length: 5 cm  Assessment   Injection assessment: negative aspiration for heme, no paresthesia on injection, local visualized surrounding nerve on ultrasound and no intravascular symptoms  Paresthesia pain: none  Slow fractionated injection: yes  Hemodynamics: stable  Outcomes: uncomplicated and patient tolerated procedure well    Medications Administered  BUPivacaine (MARCAINE) PF injection 0.5% - Perineural   20 mL - 5/14/2024 8:37:00 AM  BUPivacaine liposome (EXPAREL) injection 1.3% - Perineural   10 mL - 5/14/2024 8:37:00 AM

## 2024-05-14 NOTE — H&P
Update History & Physical    The patient's History and Physical of May 6, 2024 was reviewed with the patient and I examined the patient. There was no change. The surgical site was confirmed by the patient and me.       Plan: The risks, benefits, expected outcome, and alternative to the recommended procedure have been discussed with the patient. Patient understands and wants to proceed with the procedure.     Electronically signed by Braxton Dinh MD on 5/14/2024 at 9:22 AM

## 2024-05-14 NOTE — OP NOTE
rotator cuff was identified and noted to be a medium to large U-shaped tear involving supraspinatus and infraspinatus tendons. The footprint of the rotator cuff was debrided to bleeding bone.  An Arthrex fiber tack RC  anchor was placed along the anterior articular margin of the footprint.  Its suture tape and fiber tape sutures were passed through the leading edge of the torn supraspinatus tendon.  The suture tape sutures were passed in horizontal mattress fashion while the fiber tape suture was passed in between the limbs of the FiberWire sutures medially.  This created a ripstop pattern.  A second fiber tack RC anchor was placed along the articular margin of the humeral head at the middle of the footprint and its suture tape and fiber tape sutures were again passed in a ripstop fashion through the torn supraspinatus and infraspinatus tendons.  A final fiber tack RC anchor was placed along the posterior articular margin of the footprint and its suture tape and fiber tape sutures were passed through the torn infraspinatus tendon in a ripstop pattern as previously described.  Unfortunately this anchor pulled out and so was replaced by a 4.75 mm bio composite swivel lock anchor and its fiber tape and FiberWire sutures were also passed in a ripstop fashion through the infraspinatus tendon.  The FiberWire and suture tape sutures were then tied.  Individual fiber tape suture limbs from all 3 anchors were threaded together into an anterior lateral row 4.75mm bio composite SwivelLock anchor. The remaining fiber tape suture limbs were threaded into a posterior lateral row 4.75 mm bio composite swivelLock anchor. This resulted in a secure arthroscopic rotator cuff repair. The repair was then reevaluated and found to be stable through a full range of motion. The camera was placed back into the joint, confirming restoration of the rotator cuff insertion with an air tight seal.     Incisions were closed using a 3-0 prolene

## 2024-05-14 NOTE — ANESTHESIA POSTPROCEDURE EVALUATION
Department of Anesthesiology  Postprocedure Note    Patient: Annlaisa Claire  MRN: [id number]  YOB: 1975  Date of evaluation: 5/14/2024    Procedure Summary       Date: 05/14/24 Room / Location: [address]    Anesthesia Start: 0927 Anesthesia Stop: 1258    Procedure: LEFT SHOULDER ARTHROSCOPY SUBSCAPULARIS REPAIR WITH ARTHREX AND PRE-OP INTERSCALENE BLOCK WITH EXPAREL (Left: Arm Upper) Diagnosis:       Tear of left rotator cuff, unspecified tear extent, unspecified whether traumatic      (Tear of left rotator cuff, unspecified tear extent, unspecified whether traumatic [M75.102])    Surgeons: Braxton Dinh MD Responsible Provider: Dilma Stock MD    Anesthesia Type: general, regional ASA Status: 2            Anesthesia Type: No value filed.    Marian Phase I: Marian Score: 9    Marian Phase II:      Anesthesia Post Evaluation    Patient location during evaluation: PACU  Patient participation: complete - patient participated  Level of consciousness: awake and alert  Airway patency: patent  Nausea & Vomiting: no nausea and no vomiting  Cardiovascular status: hemodynamically stable  Respiratory status: room air and spontaneous ventilation  Hydration status: euvolemic  Multimodal analgesia pain management approach  Pain management: adequate    No notable events documented.

## 2024-05-14 NOTE — ANESTHESIA PRE PROCEDURE
Department of Anesthesiology  Preprocedure Note       Name:  Annalisa Claire   Age:  48 y.o.  :  1975                                          MRN:  [id number]         Date:  2024      Surgeon: Surgeon(s):  Braxton Dinh MD    Procedure: Procedure(s):  LEFT SHOULDER ARTHROSCOPY OPEN SUBSCAPULARIS REPAIR WITH ARTHREX AND PRE-OP INTERSCALENE BLOCK WITH EXPAREL  possible PECTORALIS MAJOR TENDON TRANSFER    Medications prior to admission:   Prior to Admission medications    Medication Sig Start Date End Date Taking? Authorizing Provider   Tirzepatide-Weight Management 2.5 MG/0.5ML SOAJ Inject 2.5 mg into the skin once a week 24   Carlos Spears MD   gabapentin (NEURONTIN) 300 MG capsule Take 1 capsule by mouth daily for 7 doses.  Patient not taking: Reported on 2024  Braxton Dinh MD   ondansetron (ZOFRAN) 4 MG tablet Take 1 tablet by mouth daily as needed for Nausea or Vomiting  Patient not taking: Reported on 2024   Bratxon Dinh MD   diclofenac sodium (VOLTAREN) 1 % GEL Apply topically 2 times daily  Patient not taking: Reported on 2024    Leonardo Wilde MD   calcium carbonate (TUMS) 500 MG chewable tablet Take 1 tablet by mouth as needed for Heartburn  Patient not taking: Reported on 2024    Leonardo Wilde MD   diclofenac (VOLTAREN) 75 MG EC tablet Take 1 tablet by mouth 2 times daily (with meals) for 14 days 24  Braxton Dinh MD   naproxen (EC NAPROSYN) 500 MG EC tablet Take 1 tablet by mouth 2 times daily as needed for Pain  Patient not taking: Reported on 2024   Flynn Ponce MD   polyethylene glycol (GLYCOLAX) 17 GM/SCOOP powder Please follow instructions provided to you by your provider.  Patient not taking: Reported on 2024   Dinora Cobian APRN - NP   bisacodyl 5 MG EC tablet Please follow instructions given to you by your provider.  Patient not taking: Reported on 2024

## 2024-05-14 NOTE — DISCHARGE INSTRUCTIONS
Braxton Dinh MD  [facility]  Specializing in Shoulder and Elbow Surgery      POSTOPERATIVE INSTRUCTIONS  Surgery: Left Shoulder Arthroscopy Rotator Cuff Repair    DIET  Begin with clear liquids and light foods (jellos, soups, etc.).  Progress to your normal diet if you are not nauseated.    WOUND CARE  Maintain your operative dressing, keeping it clean and dry.  It is normal for the shoulder to bleed and swell following surgery - if blood soaks the bandage, do not become alarmed - reinforce with additional dressing.  Remove surgical dressing on the second post-operative day - apply clean dressing (gauze and tape) and change daily.  To avoid infection, keep surgical incisions clean and dry - you may shower by placing Saran wrap or Press-and-Seal over the surgical site before a shower, and afterwards, take everything off and apply clean gauze dressings - NO immersion of operative arm (i.e. bath). Alternatively, after you’ve changed your dressing for the first time you can place waterproof band aids over your incisions to take a shower and then a apply a clean new dressing afterwards    MEDICATIONS  Your nerve block should wear off in 12-20 hours. Start taking your prescribed pain medication before it does.  Most patients will require some narcotic pain medication for a short period of time - this can be taken as per directions on the bottle.  Common side effects of the pain medication are nausea, drowsiness, and constipation - to decrease the side effects, take medication with food - if  constipation occurs, consider taking an over-the-counter laxative.  If you are having problems with nausea and vomiting, take your prescribed anti-nausea medication if provided, otherwise contact the office to possibly  have your medication changed ([phone]).  Do not drive a car or operate machinery while taking the narcotic medication.  If you are not allergic, Ibuprofen 200-600mg

## 2024-05-14 NOTE — BRIEF OP NOTE
Brief Postoperative Note      Patient: Annalisa Claire  YOB: 1975  MRN: [id number]    Date of Procedure: 5/14/2024    Pre-Op Diagnosis Codes:     * Tear of left rotator cuff, unspecified tear extent, unspecified whether traumatic [M75.102]    Post-Op Diagnosis: Same       Procedure(s):  LEFT SHOULDER ARTHROSCOPY OPEN SUBSCAPULARIS REPAIR WITH ARTHREX AND PRE-OP INTERSCALENE BLOCK WITH EXPAREL    Surgeon(s):  Braxton Dinh MD    Assistant:  First Assistant: Gómez Alvarenga PA-C    Anesthesia: General    Estimated Blood Loss (mL): Minimal    Complications: None    Specimens:   * No specimens in log *    Implants:  Implant Name Type Inv. Item Serial No.  Lot No. LRB No. Used Action   ANCHOR SUT L14.7MM DIA5.5MM BIOCOMPOSITE FULL THRD W/ 1.3MM - [id number]  ANCHOR SUT L14.7MM DIA5.5MM BIOCOMPOSITE FULL THRD W/ 1.3MM  [employer] [id number] Left 1 Implanted   ANCHOR BONE SP FBRTAK RC TGRTPE WH/BLK  - [id number]  ANCHOR BONE SP FBRTAK RC TGRTPE WH/BLK   [employer] [id number] Left 1 Implanted   ANCHOR BONE SP FBRTAK RC TGRTPE SANJEEV  - [id number]  ANCHOR BONE SP FBRTAK RC TGRTPE SANJEEV   [employer] [id number] Left 1 Implanted   ANCHOR SUTURE L 24.5 MM TANIA 4.75 MM SUTURE SZ 2 B-TCP/ PEEK - [id number]  ANCHOR SUTURE L 24.5 MM TANIA 4.75 MM SUTURE SZ 2 B-TCP/ PEEK  [employer] [id number] Left 1 Implanted   ANCHOR SUTURE BIOCOMP 4.75X19.1 MM SWIVELOCK C - [id number]  ANCHOR SUTURE BIOCOMP 4.75X19.1 MM SWIVELOCK C  [employer] [id number] Left 1 Implanted   ANCHOR SUTURE L 24.5 MM TANIA 4.75 MM SUTURE SZ 2 B-TCP/ PEEK - [id number]  ANCHOR SUTURE L 24.5 MM TANIA 4.75 MM SUTURE SZ 2 B-TCP/ PEEK  [employer] [id number] Left 1 Implanted         Drains: * No LDAs found *    Findings:  Infection Present At Time Of Surgery (PATOS) (choose all levels that have infection present):  No infection present  Other Findings: See operative report    Electronically signed by Braxton Dinh MD on 5/14/2024 at 12:31 PM

## 2024-05-15 PROBLEM — M75.122 COMPLETE TEAR OF LEFT ROTATOR CUFF: Status: ACTIVE | Noted: 2024-05-15

## 2024-05-21 ENCOUNTER — TELEPHONE (OUTPATIENT)
Dept: INTERNAL MEDICINE CLINIC | Age: 49
End: 2024-05-21

## 2024-05-21 NOTE — TELEPHONE ENCOUNTER
Patient called and stated that she due to insurance she needs to be on metformin for 30 days to see if that works and then she can go to the [facility]. Please advise.

## 2024-06-03 ENCOUNTER — OFFICE VISIT (OUTPATIENT)
Dept: ORTHOPEDIC SURGERY | Age: 49
End: 2024-06-03

## 2024-06-03 DIAGNOSIS — Z98.890 S/P ARTHROSCOPY OF SHOULDER: Primary | ICD-10-CM

## 2024-06-03 PROCEDURE — 99024 POSTOP FOLLOW-UP VISIT: CPT | Performed by: ORTHOPAEDIC SURGERY

## 2024-06-03 RX ORDER — HYDROCODONE BITARTRATE AND ACETAMINOPHEN 5; 325 MG/1; MG/1
1 TABLET ORAL EVERY 4 HOURS PRN
Qty: 30 TABLET | Refills: 0 | Status: SHIPPED | OUTPATIENT
Start: 2024-06-03 | End: 2024-06-08

## 2024-06-03 NOTE — PROGRESS NOTES
Procedure: Left shoulder arthroscopic rotator cuff repair  Date of procedure: 5/14/24    HPI:  Annalisa Claire is a 48 y.o. female who is approximately 2 weeks status post aforementioned procedure.  Indicates that she is doing relatively well.  Her pain is rated as a 8/10.  Her shoulder is most bothersome after her pendulum exercises and at nighttime She denies having any fevers, chills, sweats or any constitutional symptoms.  She has been compliant with his pendulums and sling immobilization.    Physical examination:  Evaluation of patient's left shoulder and upper extremity demonstrates her incisions to be clean, dry, intact.  There is no warmth, erythema, wound dehiscence or drainage.  Sensation is grossly intact light touch in all dermatomes and she has a 2+ radial pulse with brisk capillary refill in her fingers.    Impression and plan:  Annalisa Claire is a 48 y.o. female who is approximately 2 weeks status post an arthroscopic left shoulder rotator cuff repair.  She is doing relatively well at this time.  We reviewed his arthroscopic images. Her sutures were taken out and Steri-Strips and clean dressings applied.  She may now get her incisions wet in the shower.  She is to continue on with her immobilization.  She will also continue on with daily pendulum exercises 3 times a day.  I'll see her back for reevaluation in 4 weeks but she was encouraged to return or call earlier with questions and/or concerns.  Of note her refill for [place] was sent to her pharmacy today.

## 2024-06-04 ENCOUNTER — TELEPHONE (OUTPATIENT)
Dept: ORTHOPEDIC SURGERY | Age: 49
End: 2024-06-04

## 2024-06-04 NOTE — TELEPHONE ENCOUNTER
[facility] Paperwork received by [facility].      Release of Information scanned in patients chart.      Paperwork placed on [employer] desk.

## 2024-06-07 ENCOUNTER — TELEPHONE (OUTPATIENT)
Dept: INTERNAL MEDICINE CLINIC | Age: 49
End: 2024-06-07

## 2024-06-07 NOTE — TELEPHONE ENCOUNTER
Patient started her Metformin 5/21 and stopped it this morning. She states the side affects were horrible.     Too many GI symptoms    She wants to start the Monjouro which was sent 5/13/2024 to the pharmacy. She is going to call them to see if she can still get it. If not she will call us back.    She said thank you very much.

## 2024-06-14 NOTE — TELEPHONE ENCOUNTER
Patient called and stated that she needs us to inform the insurance company that she did try metformin so that they can cover the monjouro. Please advise.

## 2024-07-01 ENCOUNTER — TELEPHONE (OUTPATIENT)
Dept: ORTHOPEDIC SURGERY | Age: 49
End: 2024-07-01

## 2024-07-01 ENCOUNTER — OFFICE VISIT (OUTPATIENT)
Dept: ORTHOPEDIC SURGERY | Age: 49
End: 2024-07-01

## 2024-07-01 VITALS — WEIGHT: 222 LBS | RESPIRATION RATE: 14 BRPM | BODY MASS INDEX: 40.85 KG/M2 | HEIGHT: 62 IN

## 2024-07-01 DIAGNOSIS — Z98.890 STATUS POST LEFT ROTATOR CUFF REPAIR: Primary | ICD-10-CM

## 2024-07-01 PROCEDURE — 99024 POSTOP FOLLOW-UP VISIT: CPT

## 2024-07-01 NOTE — TELEPHONE ENCOUNTER
Patient provided [employer] disability paperwork at 7/1/24 office visit. Blank set of disability paperwork scanned into media. Release of information on file.

## 2024-07-02 NOTE — TELEPHONE ENCOUNTER
Paperwork nearly complete. Awaiting signature and review by Dr. Dinh. When complete, office visit notes should be faxed along with paperwork.

## 2024-07-03 NOTE — TELEPHONE ENCOUNTER
[employer] NATASHA on file as of 1/24/24. [employer] paperwork completed and scanned into chart on 7/3/24. Paperwork and Dr. Dinh's 7/1/24 office visit note e-faxed to [employer] to [phone] (number listed on paperwork).

## 2024-07-11 ENCOUNTER — OFFICE VISIT (OUTPATIENT)
Dept: INTERNAL MEDICINE CLINIC | Age: 49
End: 2024-07-11
Payer: COMMERCIAL

## 2024-07-11 VITALS
SYSTOLIC BLOOD PRESSURE: 126 MMHG | DIASTOLIC BLOOD PRESSURE: 78 MMHG | OXYGEN SATURATION: 95 % | HEART RATE: 69 BPM | WEIGHT: 231.4 LBS | HEIGHT: 62 IN | BODY MASS INDEX: 42.58 KG/M2

## 2024-07-11 DIAGNOSIS — Z13.1 ENCOUNTER FOR SCREENING FOR DIABETES MELLITUS: ICD-10-CM

## 2024-07-11 DIAGNOSIS — F33.41 RECURRENT MAJOR DEPRESSIVE DISORDER, IN PARTIAL REMISSION (HCC): ICD-10-CM

## 2024-07-11 DIAGNOSIS — E11.69 TYPE 2 DIABETES MELLITUS WITH OTHER SPECIFIED COMPLICATION, WITHOUT LONG-TERM CURRENT USE OF INSULIN (HCC): Primary | ICD-10-CM

## 2024-07-11 DIAGNOSIS — S46.002D INJURY OF LEFT ROTATOR CUFF, SUBSEQUENT ENCOUNTER: ICD-10-CM

## 2024-07-11 DIAGNOSIS — E66.01 MORBIDLY OBESE (HCC): ICD-10-CM

## 2024-07-11 PROBLEM — E11.9 TYPE 2 DIABETES MELLITUS (HCC): Status: ACTIVE | Noted: 2024-07-11

## 2024-07-11 PROCEDURE — 99214 OFFICE O/P EST MOD 30 MIN: CPT | Performed by: INTERNAL MEDICINE

## 2024-07-11 RX ORDER — TIRZEPATIDE 2.5 MG/.5ML
2.5 INJECTION, SOLUTION SUBCUTANEOUS WEEKLY
Qty: 4 EACH | Refills: 1 | Status: SHIPPED | OUTPATIENT
Start: 2024-07-11

## 2024-07-11 RX ORDER — ZOSTER VACCINE RECOMBINANT, ADJUVANTED 50 MCG/0.5
0.5 KIT INTRAMUSCULAR SEE ADMIN INSTRUCTIONS
Qty: 0.5 ML | Refills: 0 | Status: SHIPPED | OUTPATIENT
Start: 2024-07-11 | End: 2025-01-07

## 2024-07-11 ASSESSMENT — PATIENT HEALTH QUESTIONNAIRE - PHQ9
10. IF YOU CHECKED OFF ANY PROBLEMS, HOW DIFFICULT HAVE THESE PROBLEMS MADE IT FOR YOU TO DO YOUR WORK, TAKE CARE OF THINGS AT HOME, OR GET ALONG WITH OTHER PEOPLE: NOT DIFFICULT AT ALL
1. LITTLE INTEREST OR PLEASURE IN DOING THINGS: NOT AT ALL
4. FEELING TIRED OR HAVING LITTLE ENERGY: NOT AT ALL
2. FEELING DOWN, DEPRESSED OR HOPELESS: NOT AT ALL
SUM OF ALL RESPONSES TO PHQ QUESTIONS 1-9: 0
8. MOVING OR SPEAKING SO SLOWLY THAT OTHER PEOPLE COULD HAVE NOTICED. OR THE OPPOSITE, BEING SO FIGETY OR RESTLESS THAT YOU HAVE BEEN MOVING AROUND A LOT MORE THAN USUAL: NOT AT ALL
SUM OF ALL RESPONSES TO PHQ9 QUESTIONS 1 & 2: 0
3. TROUBLE FALLING OR STAYING ASLEEP: NOT AT ALL
SUM OF ALL RESPONSES TO PHQ QUESTIONS 1-9: 0
5. POOR APPETITE OR OVEREATING: NOT AT ALL
9. THOUGHTS THAT YOU WOULD BE BETTER OFF DEAD, OR OF HURTING YOURSELF: NOT AT ALL
SUM OF ALL RESPONSES TO PHQ QUESTIONS 1-9: 0
SUM OF ALL RESPONSES TO PHQ QUESTIONS 1-9: 0
7. TROUBLE CONCENTRATING ON THINGS, SUCH AS READING THE NEWSPAPER OR WATCHING TELEVISION: NOT AT ALL
6. FEELING BAD ABOUT YOURSELF - OR THAT YOU ARE A FAILURE OR HAVE LET YOURSELF OR YOUR FAMILY DOWN: NOT AT ALL

## 2024-07-11 ASSESSMENT — ENCOUNTER SYMPTOMS
COLOR CHANGE: 0
TROUBLE SWALLOWING: 0
DIARRHEA: 0
EYE PAIN: 0
BLOOD IN STOOL: 0
SHORTNESS OF BREATH: 0
WHEEZING: 0
ABDOMINAL DISTENTION: 0
EYE DISCHARGE: 0
COUGH: 0

## 2024-07-11 NOTE — PROGRESS NOTES
Visit Information    Have you changed or started any medications since your last visit including any over-the-counter medicines, vitamins, or herbal medicines? no   Are you having any side effects from any of your medications? -  no  Have you stopped taking any of your medications? Is so, why? -  no    Have you seen any other physician or provider since your last visit? Yes - Records Obtained  Have you had any other diagnostic tests since your last visit? Yes - Records Obtained  Have you been seen in the emergency room and/or had an admission to a hospital since we last saw you? Yes - Records Obtained  Have you had your routine dental cleaning in the past 6 months? no    Have you activated your [employer] account? If not, what are your barriers? Yes     Patient Care Team:  Carlos Spears MD as PCP - General (Internal Medicine)  Carlos Spears MD as PCP - Empaneled Provider    Medical History Review  Past Medical, Family, and Social History reviewed and does contribute to the patient presenting condition    Health Maintenance   Topic Date Due    Hepatitis B vaccine (1 of 3 - 3-dose series) Never done    HIV screen  Never done    Hepatitis C screen  Never done    DTaP/Tdap/Td vaccine (1 - Tdap) Never done    Diabetes screen  01/24/2023    Shingles vaccine (1 of 2) Never done    COVID-19 Vaccine (3 - 2023-24 season) 09/01/2023    Flu vaccine (1) 08/01/2024    Depression Monitoring  05/13/2025    Breast cancer screen  06/12/2025    Cervical cancer screen  07/19/2026    Lipids  06/06/2028    Colorectal Cancer Screen  06/09/2028    Hepatitis A vaccine  Aged Out    Hib vaccine  Aged Out    Polio vaccine  Aged Out    Meningococcal (ACWY) vaccine  Aged Out    Pneumococcal 0-64 years Vaccine  Aged Out    Depression Screen  Discontinued

## 2024-07-11 NOTE — PROGRESS NOTES
[facility]  [address] [address]  Dept: [phone]  Dept Fax: [phone]    Annalisa Claire is a 49 y.o. female who presents today for her medical conditions/complaintsas noted below.  Annalisa Claire is c/o of   Chief Complaint   Patient presents with    Check-Up         HPI:     HTN  Onset more than 2 years ago  kelsy mild to mod  Controlled with current po meds  Not associated with headaches or blurry vision  No chest pain    Diabetes   Duration more than 7 years  Modifying factors on Glucophage and other med  Severity uncontrolled sever  Associated signs and symtoms neuropathy/ckd/ CAD.   aggravated with sugar diet and better with low sugar diet               Hemoglobin A1C (%)   Date Value   01/24/2020 5.5             ( goal A1Cis < 7)   No components found for: \"LABMICR\"  No components found for: \"LDLCHOLESTEROL\", \"LDLCALC\"    (goal LDL is <100)   AST (U/L)   Date Value   06/06/2023 18     ALT (U/L)   Date Value   06/06/2023 18     BUN (mg/dL)   Date Value   05/06/2024 11     BP Readings from Last 3 Encounters:   07/11/24 126/78   05/14/24 130/83   05/13/24 128/86          (goal 120/80)    Past Medical History:   Diagnosis Date    Anxiety 5/3/2013    NO MED STOPPED ZOLOFT DID NOT LIKE SIDE EFFECTS TO FOLLOW WITH DR FOR ALTERNATE MED    Lumbar disc disease     Obesity (BMI 30-39.9) 1/22/2020    Rotator cuff tear 01/19/2013    TO HAVE SURGERY 07/2013, TEAR HAPPENED WHEN BENCH PRESSING WEIGHTS      Past Surgical History:   Procedure Laterality Date    BACK SURGERY      COLONOSCOPY N/A 06/09/2023    COLONOSCOPY POLYPECTOMY SNARE/HOT BIOPSY performed by Anthony Grove MD at [facility] ENDO    LUMBAR DISC SURGERY      L5 herniated disc    SHOULDER ARTHROSCOPY Right 02/05/2020    SHOULDER ARTHROSCOPY ROTATOR CUFF REPAIR, W/INTERSCALENE BLOCK & EXPAREL performed by Braxton Dinh MD at [facility] OR    SHOULDER ARTHROSCOPY Right 02/05/2020    SUBPECTORAL BICEP TENODESIS

## 2024-08-07 ENCOUNTER — HOSPITAL ENCOUNTER (OUTPATIENT)
Age: 49
Discharge: HOME | End: 2024-08-07
Payer: COMMERCIAL

## 2024-08-07 DIAGNOSIS — Z13.1 ENCOUNTER FOR SCREENING FOR DIABETES MELLITUS: ICD-10-CM

## 2024-08-07 DIAGNOSIS — E11.69 TYPE 2 DIABETES MELLITUS WITH OTHER SPECIFIED COMPLICATION, WITHOUT LONG-TERM CURRENT USE OF INSULIN (HCC): ICD-10-CM

## 2024-08-07 LAB
CHOLEST SERPL-MCNC: 168 MG/DL (ref 0–199)
CHOLESTEROL/HDL RATIO: 5
EST. AVERAGE GLUCOSE BLD GHB EST-MCNC: 117 MG/DL
GLUCOSE P FAST SERPL-MCNC: 95 MG/DL (ref 74–99)
HBA1C MFR BLD: 5.7 % (ref 4–6)
HCV AB SERPL QL IA: NONREACTIVE
HDLC SERPL-MCNC: 32 MG/DL
LDLC SERPL CALC-MCNC: 98 MG/DL (ref 0–100)
TRIGL SERPL-MCNC: 192 MG/DL
VLDLC SERPL CALC-MCNC: 38 MG/DL

## 2024-08-07 PROCEDURE — 36415 COLL VENOUS BLD VENIPUNCTURE: CPT

## 2024-08-07 PROCEDURE — 80061 LIPID PANEL: CPT

## 2024-08-07 PROCEDURE — 86803 HEPATITIS C AB TEST: CPT

## 2024-08-07 PROCEDURE — 82947 ASSAY GLUCOSE BLOOD QUANT: CPT

## 2024-08-07 PROCEDURE — 83036 HEMOGLOBIN GLYCOSYLATED A1C: CPT

## 2024-08-12 ENCOUNTER — TELEPHONE (OUTPATIENT)
Dept: ORTHOPEDIC SURGERY | Age: 49
End: 2024-08-12

## 2024-08-12 ENCOUNTER — OFFICE VISIT (OUTPATIENT)
Dept: ORTHOPEDIC SURGERY | Age: 49
End: 2024-08-12

## 2024-08-12 VITALS — BODY MASS INDEX: 41.96 KG/M2 | HEIGHT: 62 IN | WEIGHT: 228 LBS

## 2024-08-12 DIAGNOSIS — Z98.890 STATUS POST LEFT ROTATOR CUFF REPAIR: Primary | ICD-10-CM

## 2024-08-12 PROCEDURE — 99024 POSTOP FOLLOW-UP VISIT: CPT | Performed by: ORTHOPAEDIC SURGERY

## 2024-08-12 NOTE — TELEPHONE ENCOUNTER
[facility] Paperwork received by [facility].     Release of Information on file     Paperwork placed on [employer] Desk.

## 2024-08-12 NOTE — PROGRESS NOTES
Procedure: Left shoulder arthroscopic rotator cuff repair  Date of procedure: 5/14/24    HPI:  Annalisa Claire is a 49 y.o. female who is approximately 3 months status post aforementioned procedure.  Indicates that she is doing fairly well.  She does have pain in the shoulder from time to time.  Occasionally pain at nighttime interrupting her sleep.  She has kept up with her stretching program.    Physical examination:  Evaluation of patient's left shoulder and upper extremity demonstrates her incisions to be clean, dry, intact.  There is no warmth, erythema, wound dehiscence or drainage.  Sensation is grossly intact light touch in all dermatomes and she has a 2+ radial pulse with brisk capillary refill in her fingers.  She has approximately 125 degrees of shoulder elevation with 60 degrees of external rotation.    Impression and plan:  Annalisa Claire is a 49 y.o. female who is approximately 3 months status post an arthroscopic left shoulder rotator cuff repair.  She is doing relatively well at this time.  She has been diligently performing her home stretching program.  I believe she would benefit from some formal physical therapy working on her motion and gradual progressive rotator cuff and scapular stabilizer strengthening.  Consequently prescription was provided today.  She may continue use this arm for light activities of daily living limiting any lifting pushing or pulling to 2 to 3 pounds.  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  She may return or call earlier with questions end or concerns.

## 2024-08-13 RX ORDER — TIRZEPATIDE 2.5 MG/.5ML
2.5 INJECTION, SOLUTION SUBCUTANEOUS WEEKLY
Qty: 2 ML | Refills: 1 | Status: SHIPPED | OUTPATIENT
Start: 2024-08-13

## 2024-08-16 NOTE — TELEPHONE ENCOUNTER
Paperwork approved and signed by provider. Paperwork scanned into patients media.     Paperwork faxed as requested.

## 2024-08-19 ENCOUNTER — HOSPITAL ENCOUNTER (OUTPATIENT)
Dept: PHYSICAL THERAPY | Facility: CLINIC | Age: 49
Setting detail: THERAPIES SERIES
Discharge: HOME | End: 2024-08-19
Payer: COMMERCIAL

## 2024-08-19 PROCEDURE — 97110 THERAPEUTIC EXERCISES: CPT

## 2024-08-19 PROCEDURE — 97016 VASOPNEUMATIC DEVICE THERAPY: CPT

## 2024-08-19 PROCEDURE — 97161 PT EVAL LOW COMPLEX 20 MIN: CPT

## 2024-08-19 NOTE — CONSULTS
Elbow Flex. (0-150) 4+/5 5/5   Elbow Ext. (5/10-0) 5/5 5/5    25 lbs 70 lbs       FUNCTION Normal Difficult Unable   Overhead reach [] [x] []   Underarm reach  [] [x] []   Groom/Dress [] [x] []   Bra/Shirt tuck [] [x] []   Lift/Carry [] [x] []     Outcome Measure: UEFS: 43/80, 53.75% max function      Assessment: Annalisa Claire  is a 49 y.o. female who is 3 months s/p L rotator cuff repair of the subscapularis, infraspinatus, and supraspinatus. Functional limitations include: inability to work, complete self-hygiene, housework, and recreational activities. Physical limitations include: anterolateral shoulder swelling, diffuse tenderness/pain to palpation, decreased A/PROM of L shoulder, and impaired L shoulder strength. Pt will benefit from skilled therapeutic interventions to improve L shoulder pain and functional use to be able to return to ADLs, work, and recreational activities.   Problems:  L shoulder  [x] ? Pain: 4-5/10  [x] ? ROM: dec P/AROM  [x] ? Strength: decreased strength of L shoulder and scapular mm  [x] ? Function: UEFS: 43/80, 53.75% max function  [] Other:      Short term goals: MEET IN 12 VISITS Status   Pain: Pt will report less than or equal to 2-3/10 L shoulder pain at rest to assist with sleeping and 3-4/10 L shoulder pain with therapeutic interventions to assist with ROM and functional use of the L shoulder during ADLs.  Ongoing   PROM: Pt will demonstrate an improvement in PROM of the L shoulder to assist with joint mobility for improvements in functional ROM for ADLs.  Flexion: 160°  Abduction: 160°  IR: 45° in all planes  ER: 65° in all planes Ongoing   AROM: Pt will demonstrate an improvement in L shoulder AROM without compensatory strategies to assist with self-hygiene, ADL, completion, and eventually returning to work.   Flexion: 90°  Abduction: 90°  IR: midline sacrum  ER: superior aspect of L shoulder Ongoing    Strength: Pt will be able to complete L shoulder AROM/AAROM with max

## 2024-08-20 ENCOUNTER — OFFICE VISIT (OUTPATIENT)
Dept: INTERNAL MEDICINE CLINIC | Age: 49
End: 2024-08-20
Payer: COMMERCIAL

## 2024-08-20 ENCOUNTER — HOSPITAL ENCOUNTER (OUTPATIENT)
Age: 49
Setting detail: SPECIMEN
Discharge: HOME | End: 2024-08-20

## 2024-08-20 VITALS
DIASTOLIC BLOOD PRESSURE: 76 MMHG | OXYGEN SATURATION: 94 % | WEIGHT: 225 LBS | HEART RATE: 67 BPM | SYSTOLIC BLOOD PRESSURE: 124 MMHG | BODY MASS INDEX: 41.15 KG/M2

## 2024-08-20 DIAGNOSIS — Z13.220 SCREENING FOR HYPERLIPIDEMIA: ICD-10-CM

## 2024-08-20 DIAGNOSIS — F33.41 RECURRENT MAJOR DEPRESSIVE DISORDER, IN PARTIAL REMISSION (HCC): ICD-10-CM

## 2024-08-20 DIAGNOSIS — E11.69 TYPE 2 DIABETES MELLITUS WITH OTHER SPECIFIED COMPLICATION, WITHOUT LONG-TERM CURRENT USE OF INSULIN (HCC): ICD-10-CM

## 2024-08-20 DIAGNOSIS — E66.01 MORBIDLY OBESE (HCC): ICD-10-CM

## 2024-08-20 DIAGNOSIS — E11.69 TYPE 2 DIABETES MELLITUS WITH OTHER SPECIFIED COMPLICATION, WITHOUT LONG-TERM CURRENT USE OF INSULIN (HCC): Primary | ICD-10-CM

## 2024-08-20 LAB
CREAT UR-MCNC: 140 MG/DL (ref 28–217)
MICROALBUMIN UR-MCNC: <12 MG/L (ref 0–20)
MICROALBUMIN/CREAT UR-RTO: NORMAL MCG/MG CREAT (ref 0–25)

## 2024-08-20 PROCEDURE — 99214 OFFICE O/P EST MOD 30 MIN: CPT | Performed by: INTERNAL MEDICINE

## 2024-08-20 PROCEDURE — 3044F HG A1C LEVEL LT 7.0%: CPT | Performed by: INTERNAL MEDICINE

## 2024-08-20 RX ORDER — TIRZEPATIDE 5 MG/.5ML
5 INJECTION, SOLUTION SUBCUTANEOUS WEEKLY
Qty: 4 ADJUSTABLE DOSE PRE-FILLED PEN SYRINGE | Refills: 3 | Status: SHIPPED | OUTPATIENT
Start: 2024-08-20

## 2024-08-20 RX ORDER — ATORVASTATIN CALCIUM 20 MG/1
20 TABLET, FILM COATED ORAL DAILY
Qty: 90 TABLET | Refills: 0 | Status: SHIPPED | OUTPATIENT
Start: 2024-08-20

## 2024-08-20 ASSESSMENT — ENCOUNTER SYMPTOMS
WHEEZING: 0
DIARRHEA: 0
ABDOMINAL DISTENTION: 0
COLOR CHANGE: 0
BLOOD IN STOOL: 0
SHORTNESS OF BREATH: 0
EYE DISCHARGE: 0
EYE PAIN: 0
COUGH: 0
TROUBLE SWALLOWING: 0

## 2024-08-20 NOTE — PROGRESS NOTES
[facility]  [address] [address]  Dept: [phone]  Dept Fax: [phone]    Annalisa Claire is a 49 y.o. female who presents today for her medical conditions/complaintsas noted below.  Annalisa Claire is c/o of   Chief Complaint   Patient presents with    Weight Management    Discuss Labs         HPI:     HTN  Onset more than 2 years ago  kelsy mild to mod  Controlled with current po meds  Not associated with headaches or blurry vision  No chest pain    Diabetes   Duration more than 7 years  Modifying factors on Glucophage and other med  Severity uncontrolled sever  Associated signs and symtoms neuropathy/ckd/ CAD.   aggravated with sugar diet and better with low sugar diet               Hemoglobin A1C (%)   Date Value   08/07/2024 5.7   01/24/2020 5.5             ( goal A1Cis < 7)   No components found for: \"LABMICR\"  No components found for: \"LDLCHOLESTEROL\", \"LDLCALC\"    (goal LDL is <100)   AST (U/L)   Date Value   06/06/2023 18     ALT (U/L)   Date Value   06/06/2023 18     BUN (mg/dL)   Date Value   05/06/2024 11     BP Readings from Last 3 Encounters:   08/20/24 124/76   07/11/24 126/78   05/14/24 130/83          (goal 120/80)    Past Medical History:   Diagnosis Date    Anxiety 5/3/2013    NO MED STOPPED ZOLOFT DID NOT LIKE SIDE EFFECTS TO FOLLOW WITH DR FOR ALTERNATE MED    Lumbar disc disease     Obesity (BMI 30-39.9) 1/22/2020    Rotator cuff tear 01/19/2013    TO HAVE SURGERY 07/2013, TEAR HAPPENED WHEN BENCH PRESSING WEIGHTS      Past Surgical History:   Procedure Laterality Date    BACK SURGERY      COLONOSCOPY N/A 06/09/2023    COLONOSCOPY POLYPECTOMY SNARE/HOT BIOPSY performed by Anthony Grove MD at [facility] ENDO    LUMBAR DISC SURGERY      L5 herniated disc    SHOULDER ARTHROSCOPY Right 02/05/2020    SHOULDER ARTHROSCOPY ROTATOR CUFF REPAIR, W/INTERSCALENE BLOCK & EXPAREL performed by Braxton Dinh MD at [facility] OR    SHOULDER ARTHROSCOPY Right

## 2024-08-20 NOTE — PROGRESS NOTES
Visit Information    Have you changed or started any medications since your last visit including any over-the-counter medicines, vitamins, or herbal medicines? no   Are you having any side effects from any of your medications? -  no  Have you stopped taking any of your medications? Is so, why? -  no    Have you seen any other physician or provider since your last visit? Yes - Records Obtained  Have you had any other diagnostic tests since your last visit? Yes - Records Obtained  Have you been seen in the emergency room and/or had an admission to a hospital since we last saw you? No  Have you had your routine dental cleaning in the past 6 months? no    Have you activated your [employer] account? If not, what are your barriers? Yes     Patient Care Team:  Carlos Spears MD as PCP - General (Internal Medicine)  Carlos Spears MD as PCP - Empaneled Provider    Medical History Review  Past Medical, Family, and Social History reviewed and does contribute to the patient presenting condition    Health Maintenance   Topic Date Due    Pneumococcal 0-64 years Vaccine (1 of 2 - PCV) Never done    HIV screen  Never done    Diabetic retinal exam  Never done    Hepatitis B vaccine (1 of 3 - 19+ 3-dose series) Never done    DTaP/Tdap/Td vaccine (1 - Tdap) Never done    Diabetic Alb to Cr ratio (uACR) test  01/24/2021    Shingles vaccine (1 of 2) Never done    COVID-19 Vaccine (3 - 2023-24 season) 09/01/2023    Flu vaccine (1) 08/01/2024    GFR test (Diabetes, CKD 3-4, OR last GFR 15-59)  05/06/2025    Breast cancer screen  06/12/2025    Diabetic foot exam  07/11/2025    Depression Monitoring  07/11/2025    A1C test (Diabetic or Prediabetic)  08/07/2025    Lipids  08/07/2025    Cervical cancer screen  07/19/2026    Colorectal Cancer Screen  06/09/2028    Hepatitis C screen  Completed    Hepatitis A vaccine  Aged Out    Hib vaccine  Aged Out    Polio vaccine  Aged Out    Meningococcal (ACWY) vaccine  Aged Out    Depression

## 2024-08-21 ENCOUNTER — TELEPHONE (OUTPATIENT)
Dept: ORTHOPEDIC SURGERY | Age: 49
End: 2024-08-21

## 2024-08-21 NOTE — TELEPHONE ENCOUNTER
[facility] Paperwork received by [place] [facility].     Release of Information on File.     Paperwork at  in [place].

## 2024-08-22 NOTE — TELEPHONE ENCOUNTER
Patient called for update on paperwork. Patient informed that paperwork nearly completed, awaiting signature and review from Dr. Dinh. Patient voices understanding.

## 2024-08-22 NOTE — TELEPHONE ENCOUNTER
Paperwork nearly completed, awaiting approval and signature from provider.     Paperwork at  in [place].

## 2024-08-23 ENCOUNTER — HOSPITAL ENCOUNTER (OUTPATIENT)
Dept: PHYSICAL THERAPY | Facility: CLINIC | Age: 49
Setting detail: THERAPIES SERIES
Discharge: HOME | End: 2024-08-23
Payer: COMMERCIAL

## 2024-08-23 PROCEDURE — 97016 VASOPNEUMATIC DEVICE THERAPY: CPT

## 2024-08-23 PROCEDURE — 97110 THERAPEUTIC EXERCISES: CPT

## 2024-08-23 PROCEDURE — 97140 MANUAL THERAPY 1/> REGIONS: CPT

## 2024-08-23 NOTE — FLOWSHEET NOTE
[x] [facility]  Outpatient Rehabilitation &  Therapy  [address]  P: [phone]  F: [phone]  Physical Therapy Daily Treatment Note    Date:  2024  Patient Name:  Annalisa Claire    :  1975  MRN: [id number]  Physician: Braxton Dinh MD                              Insurance: [facility] [employer] EMPLOYEE ( visits remain- requires auth after  visit)  Medical Diagnosis: Z98.890 (ICD-10-CM) - Status post left rotator cuff repair                    Rehab Codes: M25.512, M25.612, R29.3, M62.512, M62.81, Z73.6  Onset Date: 24               Next 's appt: 24  Visit# / total visits: ; AUTH AFTER  VISIT     Cancels/No Shows: 4    Subjective:    Pain:  [x] Yes  [] No Location: L shoulder Pain Rating: (0-10 scale) 4/10  Pain altered Tx:  [x] No  [] Yes  Action:  Comments: Pt arrives reporting when rolling over in bed on the L shoulder it is painful. AM pain was 6/10. Describes pain currently as dull/achey. Ktape was beneficial.    Objective:  Modalities: GAMEREADY with min compression to L shoulder 10'  Precautions: L rotator cuff repair of supraspinatus, infraspinatus, and subscapularis   continue use this arm for light activities of daily living limiting any lifting pushing or pulling to 2 to 3 pounds   Exercises: bolded completed 24   Exercise Reps/ Time Weight/ Level Comments   UBE 4' lv1 forward   Ktape application x   For edema  2 fanned strips - anchored at superior aspect of shoulder with fanning strips anchored anterior and lateral aspect of the shoulder   PROM/MANUAL 18'  L shoulder PROM  Attempted pec releases with poor tolerance  Gentle GH distraction    Passive scapular mobs x   Pt position: R sidelying  Scapular mobilizations into elevation, depression, and retraction   Sidelying Scapular elevation/depression 10x ea       Sidelying scapular retraction 10x       Standing shoulder isometrics 5x5\"   Flex, abd, ext  Cuing required to

## 2024-08-27 ENCOUNTER — HOSPITAL ENCOUNTER (OUTPATIENT)
Dept: PHYSICAL THERAPY | Facility: CLINIC | Age: 49
Setting detail: THERAPIES SERIES
Discharge: HOME | End: 2024-08-27
Payer: COMMERCIAL

## 2024-08-27 PROCEDURE — 97140 MANUAL THERAPY 1/> REGIONS: CPT

## 2024-08-27 PROCEDURE — 97110 THERAPEUTIC EXERCISES: CPT

## 2024-08-27 PROCEDURE — 97016 VASOPNEUMATIC DEVICE THERAPY: CPT

## 2024-08-27 NOTE — FLOWSHEET NOTE
[x] [facility]  Outpatient Rehabilitation &  Therapy  [address]  P: [phone]  F: [phone]  Physical Therapy Daily Treatment Note    Date:  2024  Patient Name:  Annalisa Claire    :  1975  MRN: [id number]  Physician: Braxton Dinh MD                              Insurance: [facility] EMPLOYEE ( visits remain- requires auth after  visit)  Medical Diagnosis: Z98.890 (ICD-10-CM) - Status post left rotator cuff repair                    Rehab Codes: M25.512, M25.612, R29.3, M62.512, M62.81, Z73.6  Onset Date: 24               Next 's appt: 24  Visit# / total visits: 3/24; AUTH AFTER  VISIT     Cancels/No Shows: 0/0    Subjective:    Pain:  [x] Yes  [] No Location: L shoulder Pain Rating: (0-10 scale) 3/10  Pain altered Tx:  [x] No  [] Yes  Action:  Comments: Pt notes pain last night was a 7/10. Pt notes she did attempt to sweep one step with a handheld vacuum with her L arm yesterday but was not able to tolerate it.    Objective:  Modalities: GAMEREADY with min compression to L shoulder 15'  Precautions: L rotator cuff repair of supraspinatus, infraspinatus, and subscapularis   continue use this arm for light activities of daily living limiting any lifting pushing or pulling to 2 to 3 pounds   Exercises: bolded completed 24   Exercise Reps/ Time Weight/ Level Comments   UBE 4' lv1 forward   Ktape application x   Redness and raised skin  Deferred   Re-apply next session  For edema  2 fanned strips - anchored at superior aspect of shoulder with fanning strips anchored anterior and lateral aspect of the shoulder    PROM/MANUAL 11'  9'  L shoulder PROM  9' of MFR to the L biceps mm   Supine ER wand  20x cane    Chest press 10x cane    Sidelying Scapular elevation/depression 10x ea       Sidelying scapular retraction 10x       Standing shoulder isometrics 5x5\"   Flex, abd, ext  Cuing required to decrease force to avoid pain   Standing  140°  Abduction: 120°  IR: midline L2/3  ER: L scapular spine Ongoing   Strength: Pt will demonstrate greater than or equal to 4/5 L deltoid, rotator cuff, and scapular strength to assist with ability to lift and reach overhead for ADLs, self-hygiene, and work completion.  Ongoing   Outcome Score: Pt will score greater than or equal to 84% on the UEFS in order to demonstrate improved function Ongoing                     Patient goals: improve ROM, strength, and pain; improve sleep and go back to work    Pt. Education:  [x] Yes  [] No  [] Reviewed Prior HEP/Ed  Method of Education: [x] Verbal  [] Demo  [] Written  Access Code: [email or web address]  Exercises  - Standing Isometric Shoulder Flexion with Doorway - Arm Bent  - 1-2 x daily - 5 reps - 5\" hold  - Standing Isometric Shoulder Extension with Doorway - Arm Bent  - 1-2 x daily - 5 reps - 5\" hold  - Standing Isometric Shoulder Abduction with Doorway - Arm Bent  - 1-2 x daily - 5 reps - 5\" hold  - Seated Scapular Retraction  - 5 x daily - 5-10 reps  Comprehension of Education:  [x] Verbalizes understanding.  [] Demonstrates understanding.  [] Needs review.  [] Demonstrates/verbalizes HEP/Ed previously given.     Plan: [x] Continue current frequency toward long and short term goals.    [x] Specific Instructions for subsequent treatments: see above      Time In: 200 p            Time Out: 304 p    Electronically signed by:  Verónica Weaver, PT

## 2024-08-27 NOTE — PROGRESS NOTES
Yes  [] No   Location: L shoulder   Pain Rating: (0-10 scale) 3/10  Pain altered Tx:  [x] No  [] Yes  Action:  Comments: Pt notes pain last night was a 7/10. Pt notes she did attempt to sweep one step with a handheld vacuum with her L arm yesterday but was not able to tolerate it.       Objective:    *=pain ROM  °A/P   PROM (supine)     Eval  8/27   Flexion 135°*  118*   ABD (0-180) 95°*  hiking   107*   ER @ neutral 45°*  47*   ER @ 45 abd 50°*  33*   IR @ neutral stomach  stomach    IR @ 45 abd 38°*     Elbow flexion  0° with some resistance         Assessment:  Pt with continued poor tolerance to PROM of the L shoulder due to pain. Limited ROM is due to pain. Multiple rest breaks are taken for pt to hold the L arm in a guarded position. Increased tenderness is noted throughout the L biceps musculature and completed MFR to the biceps musculature to improve tissue tension with positive response. Continued tenderness is noted. Introduced AAROM ER and chest press with fair tolerance. Minimal reps of chest press were completed compared to ER secondary to increased L shoulder pain. Better tolerance to stability ball rolls on a table into flexion is noted compared to scaption. Pt deferred completion of ladder crawls this date. Therapist deferred application of ktape this date due to redness over previously applied skin. Will re-apply next session. Did end with vasocompression for pain.                          [] Other:  [x] Patient would continue to benefit from skilled physical therapy services in order to:  improve L shoulder pain and functional use to be able to return to ADLs, work, and recreational activities.   Problems:  L shoulder  [x] ? Pain: 4-5/10  [x] ? ROM: dec P/AROM  [x] ? Strength: decreased strength of L shoulder and scapular mm  [x] ? Function: UEFS: 43/80, 53.75% max function  [] Other:        Short term goals: MEET IN 12 VISITS Status   Pain: Pt will report less than or equal to 2-3/10 L shoulder  pain at rest to assist with sleeping and 3-4/10 L shoulder pain with therapeutic interventions to assist with ROM and functional use of the L shoulder during ADLs.  Ongoing 8/27/24   PROM: Pt will demonstrate an improvement in PROM of the L shoulder to assist with joint mobility for improvements in functional ROM for ADLs.  Flexion: 160°  Abduction: 160°  IR: 45° in all planes  ER: 65° in all planes Ongoing 8/27/24   AROM: Pt will demonstrate an improvement in L shoulder AROM without compensatory strategies to assist with self-hygiene, ADL, completion, and eventually returning to work.   Flexion: 90°  Abduction: 90°  IR: midline sacrum  ER: superior aspect of L shoulder Ongoing    Strength: Pt will be able to complete L shoulder AROM/AAROM with max of 1-2/10 L shoulder pain to allow for progression to PRE strengthening.  Ongoing   Outcome Score: Pt will score greater than or equal to 64% on the UEFS in order to demonstrate improved function     Home Exercise Program: Pt will demonstrate independence with HEP. Ongoing   Longer term goals: MEET IN 24 VISITS     Pain: Pt will report less than or equal to 0-1/10 L shoulder pain at rest and 2-3/10 L shoulder pain with reaching overhead/out to the side and behind the back to assist with self-hygiene, ADLs, and return to work. Ongoing   AROM: Pt will demonstrate an improvement in L shoulder AROM without compensatory strategies to assist with self-hygiene, ADL, completion, and eventually returning to work.   Flexion: 140°  Abduction: 120°  IR: midline L2/3  ER: L scapular spine Ongoing   Strength: Pt will demonstrate greater than or equal to 4/5 L deltoid, rotator cuff, and scapular strength to assist with ability to lift and reach overhead for ADLs, self-hygiene, and work completion.  Ongoing   Outcome Score: Pt will score greater than or equal to 84% on the UEFS in order to demonstrate improved function Ongoing                     Patient goals: improve ROM, strength, and

## 2024-08-29 ENCOUNTER — HOSPITAL ENCOUNTER (OUTPATIENT)
Dept: PHYSICAL THERAPY | Facility: CLINIC | Age: 49
Setting detail: THERAPIES SERIES
Discharge: HOME | End: 2024-08-29
Payer: COMMERCIAL

## 2024-08-29 PROCEDURE — 97110 THERAPEUTIC EXERCISES: CPT

## 2024-08-29 PROCEDURE — 97016 VASOPNEUMATIC DEVICE THERAPY: CPT

## 2024-08-29 PROCEDURE — 97140 MANUAL THERAPY 1/> REGIONS: CPT

## 2024-08-29 NOTE — FLOWSHEET NOTE
[x] [facility]  Outpatient Rehabilitation &  Therapy  [address]  P: [phone]  F: [phone]  Physical Therapy Daily Treatment Note    Date:  2024  Patient Name:  Annalisa Claire    :  1975  MRN: [id number]  Physician: Braxton Dinh MD                              Insurance: [employer] [employer] EMPLOYEE ( visits remain- requires auth after  visit)  Medical Diagnosis: Z98.890 (ICD-10-CM) - Status post left rotator cuff repair                    Rehab Codes: M25.512, M25.612, R29.3, M62.512, M62.81, Z73.6  Onset Date: 24               Next 's appt: 24  Visit# / total visits: ; AUTH AFTER  VISIT     Cancels/No Shows: 0/0    Subjective:    Pain:  [x] Yes  [] No Location: L shoulder Pain Rating: (0-10 scale) 5/10 with motion, 3/10 typically at rest  Pain altered Tx:  [x] No  [] Yes  Action:  Comments: Pt notes that it is getting better. Some pain in right shoulder after lceaning at home.  Sore at night as well.     Objective:  Modalities: GAMEREADY with min compression to L shoulder 15'  Precautions: L rotator cuff repair of supraspinatus, infraspinatus, and subscapularis   continue use this arm for light activities of daily living limiting any lifting pushing or pulling to 2 to 3 pounds     Exercises: bolded completed 24   Exercise Reps/ Time Weight/ Level Comments   UBE 4' lv1 forward   Ktape application x    resumed kinesiotape  Re-apply next session  For edema  2 fanned strips - anchored at superior aspect of shoulder with fanning strips anchored anterior and lateral aspect of the shoulder    PROM/MANUAL 15, 10  L shoulder PROM  10 of MFR to the L biceps mm   Supine ER wand  20x cane    Chest press 15x cane    Sidelying Scapular elevation/depression 10x ea       Sidelying scapular retraction 10x       Standing shoulder isometrics 5x5\"   Flex, abd, ext  Cuing required to decrease force to avoid pain     Standing stability ball rolls 20x  Ongoing                     Patient goals: improve ROM, strength, and pain; improve sleep and go back to work    Pt. Education:  [] Yes  [x] No  [] Reviewed Prior HEP/Ed  Method of Education: [] Verbal  [] Demo  [] Written  Access Code: [email or web address]  Exercises  - Standing Isometric Shoulder Flexion with Doorway - Arm Bent  - 1-2 x daily - 5 reps - 5\" hold  - Standing Isometric Shoulder Extension with Doorway - Arm Bent  - 1-2 x daily - 5 reps - 5\" hold  - Standing Isometric Shoulder Abduction with Doorway - Arm Bent  - 1-2 x daily - 5 reps - 5\" hold  - Seated Scapular Retraction  - 5 x daily - 5-10 reps  Comprehension of Education:  [] Verbalizes understanding.  [] Demonstrates understanding.  [] Needs review.  [] Demonstrates/verbalizes HEP/Ed previously given.     Plan: [x] Continue current frequency toward long and short term goals.    [x] Specific Instructions for subsequent treatments: see above      Time In:  2: 14 pm            Time Out:  3:06 pm    Electronically signed by:  EDSON PUENTE, PT

## 2024-09-03 ENCOUNTER — HOSPITAL ENCOUNTER (OUTPATIENT)
Dept: PHYSICAL THERAPY | Facility: CLINIC | Age: 49
Setting detail: THERAPIES SERIES
Discharge: HOME | End: 2024-09-03
Payer: COMMERCIAL

## 2024-09-03 PROCEDURE — 97140 MANUAL THERAPY 1/> REGIONS: CPT

## 2024-09-03 PROCEDURE — 97016 VASOPNEUMATIC DEVICE THERAPY: CPT

## 2024-09-03 PROCEDURE — 97112 NEUROMUSCULAR REEDUCATION: CPT

## 2024-09-03 NOTE — FLOWSHEET NOTE
subsequent treatments: see above      Time In:  12:02 pm            Time Out:  1:20 pm    Electronically signed by:  RYLEE Saravia    This documentation has been reviewed and entirety of treatment session with direct supervision by clinical instructor, Verónica Weaver PT, DPT. Clinical instructor agrees with all documentation.

## 2024-09-05 ENCOUNTER — HOSPITAL ENCOUNTER (OUTPATIENT)
Dept: PHYSICAL THERAPY | Facility: CLINIC | Age: 49
Setting detail: THERAPIES SERIES
Discharge: HOME | End: 2024-09-05
Payer: COMMERCIAL

## 2024-09-05 NOTE — FLOWSHEET NOTE
[facility] Outpatient Rehabilitation &  Therapy  [address]   [address]  P: [phone]  F: [phone]    Therapy Cancel/No Show note    Date: 2024  Patient: Annalisa Claire  : 1975  MRN: [id number]    Cancels/No Shows to date: 0/0 -- not held against pt.     For today's appointment patient:    [x]  Cancelled -- not held against pt    [] Rescheduled appointment    [] No-show     Reason given by patient:    []  Patient ill    []  Conflicting appointment    [] No transportation      [] Conflict with work    [] No reason given    [] Weather related    [] COVID-19    [] Other:      Comments:  insurance auth not received yet.       [] Next appointment was confirmed    Electronically signed by: Manuel Malik PTA

## 2024-09-09 ENCOUNTER — HOSPITAL ENCOUNTER (OUTPATIENT)
Dept: PHYSICAL THERAPY | Facility: CLINIC | Age: 49
Setting detail: THERAPIES SERIES
Discharge: HOME | End: 2024-09-09
Payer: COMMERCIAL

## 2024-09-12 ENCOUNTER — HOSPITAL ENCOUNTER (OUTPATIENT)
Dept: PHYSICAL THERAPY | Facility: CLINIC | Age: 49
Setting detail: THERAPIES SERIES
Discharge: HOME | End: 2024-09-12
Payer: COMMERCIAL

## 2024-09-12 PROCEDURE — 97110 THERAPEUTIC EXERCISES: CPT

## 2024-09-12 PROCEDURE — 97016 VASOPNEUMATIC DEVICE THERAPY: CPT

## 2024-09-12 PROCEDURE — 97140 MANUAL THERAPY 1/> REGIONS: CPT

## 2024-09-16 ENCOUNTER — HOSPITAL ENCOUNTER (OUTPATIENT)
Dept: PHYSICAL THERAPY | Facility: CLINIC | Age: 49
Setting detail: THERAPIES SERIES
Discharge: HOME | End: 2024-09-16
Payer: COMMERCIAL

## 2024-09-16 PROCEDURE — 97110 THERAPEUTIC EXERCISES: CPT

## 2024-09-16 PROCEDURE — 97016 VASOPNEUMATIC DEVICE THERAPY: CPT

## 2024-09-16 PROCEDURE — 97140 MANUAL THERAPY 1/> REGIONS: CPT

## 2024-09-17 ENCOUNTER — TELEPHONE (OUTPATIENT)
Dept: INTERNAL MEDICINE CLINIC | Age: 49
End: 2024-09-17

## 2024-09-17 DIAGNOSIS — E11.69 TYPE 2 DIABETES MELLITUS WITH OTHER SPECIFIED COMPLICATION, WITHOUT LONG-TERM CURRENT USE OF INSULIN (HCC): ICD-10-CM

## 2024-09-19 ENCOUNTER — HOSPITAL ENCOUNTER (OUTPATIENT)
Dept: PHYSICAL THERAPY | Facility: CLINIC | Age: 49
Setting detail: THERAPIES SERIES
Discharge: HOME | End: 2024-09-19
Payer: COMMERCIAL

## 2024-09-19 PROCEDURE — 97140 MANUAL THERAPY 1/> REGIONS: CPT

## 2024-09-19 PROCEDURE — 97110 THERAPEUTIC EXERCISES: CPT

## 2024-09-23 ENCOUNTER — HOSPITAL ENCOUNTER (OUTPATIENT)
Dept: PHYSICAL THERAPY | Facility: CLINIC | Age: 49
Setting detail: THERAPIES SERIES
Discharge: HOME | End: 2024-09-23
Payer: COMMERCIAL

## 2024-09-23 ENCOUNTER — OFFICE VISIT (OUTPATIENT)
Dept: ORTHOPEDIC SURGERY | Age: 49
End: 2024-09-23
Payer: COMMERCIAL

## 2024-09-23 VITALS — HEIGHT: 62 IN | RESPIRATION RATE: 14 BRPM | WEIGHT: 225 LBS | BODY MASS INDEX: 41.41 KG/M2

## 2024-09-23 DIAGNOSIS — Z98.890 STATUS POST LEFT ROTATOR CUFF REPAIR: Primary | ICD-10-CM

## 2024-09-23 PROCEDURE — 97140 MANUAL THERAPY 1/> REGIONS: CPT

## 2024-09-23 PROCEDURE — 97016 VASOPNEUMATIC DEVICE THERAPY: CPT

## 2024-09-23 PROCEDURE — 99212 OFFICE O/P EST SF 10 MIN: CPT | Performed by: ORTHOPAEDIC SURGERY

## 2024-09-23 PROCEDURE — 97110 THERAPEUTIC EXERCISES: CPT

## 2024-09-23 NOTE — PROGRESS NOTES
Procedure: Left shoulder arthroscopic rotator cuff repair  Date of procedure: 5/14/24    HPI:  Annalisa Claire is a 49 y.o. female who is approximately 4 months status post aforementioned procedure.  She is in therapy and states that she is noticing improvement.  She typically has pain over therapy and shortly after.    Physical examination:  Evaluation of patient's left shoulder and upper extremity demonstrates her incisions to be clean, dry, intact.  There is no warmth, erythema, wound dehiscence or drainage.  Sensation is grossly intact light touch in all dermatomes and she has a 2+ radial pulse with brisk capillary refill in her fingers.  She has approximately 125 degrees of shoulder elevation 120 degrees of abduction with 60 degrees of external rotation.  5/5 muscle strength with results that deltoid, external rotation and internal rotation testing.  4+/5 muscle strength with supraspinatus testing.    Impression and plan:  Annalisa Claire is a 49 y.o. female who is approximately 4 months status post an arthroscopic left shoulder rotator cuff repair.  She continues to do relatively well making progress with rehabilitation.  She was encouraged to keep up with therapy working on her motion and gradual progressive strengthening.  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  I believe it is reasonable to give herself some more time with rehabilitation prior to returning to full duty and so a letter was provided keeping her off of work until 10/16/2024.  See her back for reevaluation in 6 to 8 weeks but she was encouraged to return or call earlier with questions or concerns.

## 2024-09-24 ENCOUNTER — TELEPHONE (OUTPATIENT)
Dept: ORTHOPEDIC SURGERY | Age: 49
End: 2024-09-24

## 2024-09-26 ENCOUNTER — HOSPITAL ENCOUNTER (OUTPATIENT)
Dept: PHYSICAL THERAPY | Facility: CLINIC | Age: 49
Setting detail: THERAPIES SERIES
Discharge: HOME | End: 2024-09-26
Payer: COMMERCIAL

## 2024-09-26 PROCEDURE — 97016 VASOPNEUMATIC DEVICE THERAPY: CPT

## 2024-09-26 PROCEDURE — 97140 MANUAL THERAPY 1/> REGIONS: CPT

## 2024-09-26 PROCEDURE — 97110 THERAPEUTIC EXERCISES: CPT

## 2024-09-30 ENCOUNTER — HOSPITAL ENCOUNTER (OUTPATIENT)
Dept: PHYSICAL THERAPY | Facility: CLINIC | Age: 49
Setting detail: THERAPIES SERIES
Discharge: HOME | End: 2024-09-30
Payer: COMMERCIAL

## 2024-09-30 PROCEDURE — 97110 THERAPEUTIC EXERCISES: CPT

## 2024-09-30 PROCEDURE — 97016 VASOPNEUMATIC DEVICE THERAPY: CPT

## 2024-09-30 NOTE — FLOWSHEET NOTE
[x] [facility]  Outpatient Rehabilitation &  Therapy  [address]  P: [phone]  F: [phone]  Physical Therapy Daily Treatment Note    Date:  2024  Patient Name:  Annalisa Claire    :  1975  MRN: [id number]  Physician: Braxton Dinh MD                              Insurance: [facility] EMPLOYEE  Medical Diagnosis: Z98.890 (ICD-10-CM) - Status post left rotator cuff repair                    Rehab Codes: M25.512, M25.612, R29.3, M62.512, M62.81, Z73.6  Onset Date: 24               Next 's appt: 24  Visit# / total visits: ;     Cancels/No Shows: 0/0    Subjective:    Pain:  [x] Yes  [] No Location: L shoulder Pain Rating: (0-10 scale) achey 2/10   Pain altered Tx:  [x] No  [] Yes  Action:  Comments: Patient reports always being sore after PT, she had two instances of sharp shooting pain after previous session.       Objective:  Modalities: GAMEREADY with min compression to L shoulder 15' -   Precautions: L rotator cuff repair of supraspinatus, infraspinatus, and subscapularis   continue use this arm for light activities of daily living limiting any lifting pushing or pulling to 2 to 3 pounds     Exercises: bolded completed 24   Exercise Reps/ Time Weight/ Level Comments   UBE 6' lv1 forward   Ktape application x    resumed kinesiotape  Re-apply next session  For edema  2 fanned strips - anchored at superior aspect of shoulder with fanning strips anchored anterior and lateral aspect of the shoulder    MANUAL  10'  Pt position: R sidelying   Hypervolt- cushion head  Posterior shoulder and scapular mm    In seated on - per pt request     End session KT Tape to shoulder for global glenohumeral joint support.    PROM/MANUAL x  L PROM   Rhythmic stabilization (anticipatory pattern) 3x15\" ea  1. 90° shld flexion - flex/ext, horiz abd/add   2. Shld Abd 45° - elbow flexed, IR/ER    Chest press 20x 2 lb wand Incr weight 9/3   H. Abd/add 10x

## 2024-10-03 ENCOUNTER — HOSPITAL ENCOUNTER (OUTPATIENT)
Dept: PHYSICAL THERAPY | Facility: CLINIC | Age: 49
Setting detail: THERAPIES SERIES
Discharge: HOME | End: 2024-10-03
Payer: COMMERCIAL

## 2024-10-03 PROCEDURE — 97016 VASOPNEUMATIC DEVICE THERAPY: CPT

## 2024-10-03 PROCEDURE — 97110 THERAPEUTIC EXERCISES: CPT

## 2024-10-03 NOTE — FLOWSHEET NOTE
x daily - 7 x weekly - 3 sets - 10 reps  - Prone Shoulder Extension  - 1 x daily - 7 x weekly - 3 sets - 10 reps  - Standing Shoulder Row with Anchored Resistance  - 1 x daily - 7 x weekly - 3 sets - 10 reps  [x] Verbalizes understanding.  [x] Demonstrates understanding.  [] Needs review.  [x] Demonstrates/verbalizes HEP/Ed previously given.     Plan: [x] Continue current frequency toward long and short term goals.    [x] Specific Instructions for subsequent treatments: see above      Time In:  2:06 pm          Time Out:  2:58 pm    Electronically signed by:  KEATON VALDEZ PTA

## 2024-10-07 ENCOUNTER — HOSPITAL ENCOUNTER (OUTPATIENT)
Dept: PHYSICAL THERAPY | Facility: CLINIC | Age: 49
Setting detail: THERAPIES SERIES
Discharge: HOME | End: 2024-10-07
Payer: COMMERCIAL

## 2024-10-07 PROCEDURE — 97110 THERAPEUTIC EXERCISES: CPT

## 2024-10-07 NOTE — FLOWSHEET NOTE
[x] [facility]  Outpatient Rehabilitation &  Therapy  [address]  P: [phone]  F: [phone]  Physical Therapy Daily Treatment Note    Date:  10/7/2024  Patient Name:  Annalisa Claire    :  1975  MRN: [id number]  Physician: Braxton Dinh MD                              Insurance: [employer] EMPLOY[employer]  Medical Diagnosis: Z98.890 (ICD-10-CM) - Status post left rotator cuff repair                    Rehab Codes: M25.512, M25.612, R29.3, M62.512, M62.81, Z73.6  Onset Date: 24               Next 's appt: 24  Visit# / total visits: ;     Cancels/No Shows: 0/0    Subjective:    Pain:  [x] Yes  [] No Location: L shoulder Pain Rating: (0-10 scale) achey /10   Pain altered Tx:  [x] No  [] Yes  Action:    Comments: Pt arrives reporting the L shoulder feels heavy.       Objective:  Modalities: GAMEREADY with min compression to L shoulder 10' - deferred 10/07/24   Precautions: L rotator cuff repair of supraspinatus, infraspinatus, and subscapularis   continue use this arm for light activities of daily living limiting any lifting pushing or pulling to 2 to 3 pounds     Exercises: bolded completed 10/07/24   Exercise Reps/ Time Weight/ Level Comments   UBE 2/2 lv1 Forward; inc resistance per Pt. Request 10/3   Ktape application x    resumed kinesiotape  Re-apply next session  For edema  2 fanned strips - anchored at superior aspect of shoulder with fanning strips anchored anterior and lateral aspect of the shoulder    MANUAL  10'  Pt position: R sidelying   Hypervolt- cushion head  Posterior shoulder and scapular mm    In seated on - per pt request     End session KT Tape to shoulder for global glenohumeral joint support.    PROM/MANUAL x  L PROM         Prone- Stability ball      Shoulder ext 2x10 2# Added 10/   T  Y 2x10  X10 A NEW    Row 2x10 2# Added weight 10         SEATED      Bicep curls 20x palm up   20x- thumb up  2# DB Inc weight 10/3 
Cigarettes

## 2024-10-10 ENCOUNTER — HOSPITAL ENCOUNTER (OUTPATIENT)
Dept: PHYSICAL THERAPY | Facility: CLINIC | Age: 49
Setting detail: THERAPIES SERIES
Discharge: HOME | End: 2024-10-10
Payer: COMMERCIAL

## 2024-10-10 NOTE — FLOWSHEET NOTE
[] [facility]  Outpatient Rehabilitation &  Therapy  [address]  [phone]  [phone] [x] [facility]  Outpatient Rehabilitation &  Therapy  [address]  P: [phone]  F: [phone] [] [facility]  Outpatient Rehabilitation &  Therapy  [address]  P: [phone]  F: [phone] [] [facility]  Outpatient Rehabilitation &  Therapy  [address]  [phone]  [phone] [] [facility]  Outpatient Rehabilitation &  Therapy  [address]   P: [phone]  F: [phone]  [] [facility]  Outpatient Rehabilitation &  Therapy  [address]  P: [phone]  F: [phone] [] [facility]  Outpatient Rehabilitation &  Therapy  [address]  P: [phone]  F: [phone] [] [facility]  Outpatient Rehabilitation &  Therapy  [address]  P: [phone]  F: [phone] [] [facility]  Outpatient Rehabilitation &  Therapy  [address]  P: [phone]  F: [phone]  [] [facility] Outpatient Rehabilitation &  Therapy  [address]  P: [phone]  F: [phone]     Therapy Cancel/No Show note    Date: 10/10/2024  Patient: Annalisa Claire  : 1975  MRN: [id number]    Cancels/No Shows to date: 10    For today's appointment patient:    [x]  Cancelled    [] Rescheduled appointment    [] No-show     Reason given by patient:    [x]  Patient ill    []  Conflicting appointment    [] No transportation      [] Conflict with work    [] No reason given    [] Weather related    [] COVID-19    [] Other:      Comments:        [x] Next appointment was confirmed    Electronically signed by: Verónica Weaver PT

## 2024-10-16 DIAGNOSIS — E11.69 TYPE 2 DIABETES MELLITUS WITH OTHER SPECIFIED COMPLICATION, WITHOUT LONG-TERM CURRENT USE OF INSULIN (HCC): ICD-10-CM

## 2024-10-16 RX ORDER — TIRZEPATIDE 10 MG/.5ML
10 INJECTION, SOLUTION SUBCUTANEOUS WEEKLY
Qty: 4 ADJUSTABLE DOSE PRE-FILLED PEN SYRINGE | Refills: 3 | Status: SHIPPED | OUTPATIENT
Start: 2024-10-16

## 2024-10-17 ENCOUNTER — HOSPITAL ENCOUNTER (OUTPATIENT)
Dept: PHYSICAL THERAPY | Facility: CLINIC | Age: 49
Setting detail: THERAPIES SERIES
Discharge: HOME | End: 2024-10-17
Payer: COMMERCIAL

## 2024-10-17 NOTE — FLOWSHEET NOTE
[] [facility]  Outpatient Rehabilitation &  Therapy  [address]  [phone]  [phone] [x] [facility]  Outpatient Rehabilitation &  Therapy  [address]  P: [phone]  F: [phone] [] [facility]  Outpatient Rehabilitation &  Therapy  [address]  P: [phone]  F: [phone] [] [facility]  Outpatient Rehabilitation &  Therapy  [address]  [phone]  [phone] [] [facility]  Outpatient Rehabilitation &  Therapy  [address]   P: [phone]  F: [phone]  [] [facility]  Outpatient Rehabilitation &  Therapy  [address]  P: [phone]  F: [phone] [] [facility]  Outpatient Rehabilitation &  Therapy  [address]  P: [phone]  F: [phone] [] [facility]  Outpatient Rehabilitation &  Therapy  [address]  P: [phone]  F: [phone] [] [facility]  Outpatient Rehabilitation &  Therapy  [address]  P: [phone]  F: [phone]  [] [facility] Outpatient Rehabilitation &  Therapy  [address]  P: [phone]  F: [phone]     Therapy Cancel/No Show note    Date: 10/17/2024  Patient: Annalisa Claire  : 1975  MRN: [id number]    Cancels/No Shows to date:     For today's appointment patient:    [x]  Cancelled    [] Rescheduled appointment    [] No-show     Reason given by patient:    [x]  Patient ill    []  Conflicting appointment    [] No transportation      [] Conflict with work    [] No reason given    [] Weather related    [] COVID-19    [x] Other:      Comments:  Pt reports she is sick and will call back to reschedule. Pt also notes she is back to work.       [] Next appointment was confirmed    Electronically signed by: Manuel Malik PTA

## 2024-11-22 ENCOUNTER — TELEPHONE (OUTPATIENT)
Dept: INTERNAL MEDICINE CLINIC | Age: 49
End: 2024-11-22

## 2024-11-22 NOTE — TELEPHONE ENCOUNTER
----- Message from Juvenal GNAT sent at 11/21/2024  2:12 PM EST -----  Regarding: [facility] Appointment Request  E[facility] Appointment Request    Patient needs appointment for [facility] Appointment Type: Existing Condition Follow Up.    Patient Requested Dates(s): November 22 or 26, 2024 and December 4, 2024  Patient Requested Time: any time  Provider Name: Carlos Spears MD    Reason for Appointment Request: Established Patient - Available appointments did not meet patient need  --------------------------------------------------------------------------------------------------------------------------    Relationship to Patient: Self     Call Back Information: OK to leave message on voicemail  Preferred Call Back Number: Phone ;  [phone]

## 2025-01-22 RX ORDER — TIRZEPATIDE 12.5 MG/.5ML
0.5 INJECTION, SOLUTION SUBCUTANEOUS WEEKLY
Qty: 2 ML | Refills: 1 | Status: SHIPPED | OUTPATIENT
Start: 2025-01-22

## 2025-02-06 ENCOUNTER — TELEMEDICINE (OUTPATIENT)
Dept: INTERNAL MEDICINE CLINIC | Age: 50
End: 2025-02-06
Payer: COMMERCIAL

## 2025-02-06 DIAGNOSIS — F33.41 RECURRENT MAJOR DEPRESSIVE DISORDER, IN PARTIAL REMISSION (HCC): ICD-10-CM

## 2025-02-06 DIAGNOSIS — E66.01 MORBIDLY OBESE: Primary | ICD-10-CM

## 2025-02-06 DIAGNOSIS — E11.9 TYPE 2 DIABETES MELLITUS WITHOUT COMPLICATION, WITHOUT LONG-TERM CURRENT USE OF INSULIN (HCC): ICD-10-CM

## 2025-02-06 PROBLEM — E11.69 TYPE 2 DIABETES MELLITUS WITH OTHER SPECIFIED COMPLICATION, WITHOUT LONG-TERM CURRENT USE OF INSULIN (HCC): Status: RESOLVED | Noted: 2025-02-06 | Resolved: 2025-02-06

## 2025-02-06 PROBLEM — E11.69 TYPE 2 DIABETES MELLITUS WITH OTHER SPECIFIED COMPLICATION, WITHOUT LONG-TERM CURRENT USE OF INSULIN (HCC): Status: ACTIVE | Noted: 2025-02-06

## 2025-02-06 PROCEDURE — 99213 OFFICE O/P EST LOW 20 MIN: CPT | Performed by: INTERNAL MEDICINE

## 2025-02-06 RX ORDER — TIRZEPATIDE 12.5 MG/.5ML
0.5 INJECTION, SOLUTION SUBCUTANEOUS WEEKLY
Qty: 2 ML | Refills: 3 | Status: SHIPPED | OUTPATIENT
Start: 2025-02-06

## 2025-02-06 SDOH — ECONOMIC STABILITY: FOOD INSECURITY: WITHIN THE PAST 12 MONTHS, YOU WORRIED THAT YOUR FOOD WOULD RUN OUT BEFORE YOU GOT MONEY TO BUY MORE.: PATIENT DECLINED

## 2025-02-06 SDOH — ECONOMIC STABILITY: FOOD INSECURITY: WITHIN THE PAST 12 MONTHS, THE FOOD YOU BOUGHT JUST DIDN'T LAST AND YOU DIDN'T HAVE MONEY TO GET MORE.: PATIENT DECLINED

## 2025-02-06 ASSESSMENT — ENCOUNTER SYMPTOMS
DIARRHEA: 0
COLOR CHANGE: 0
BLOOD IN STOOL: 0
WHEEZING: 0
SHORTNESS OF BREATH: 0
EYE PAIN: 0
ABDOMINAL DISTENTION: 0
EYE DISCHARGE: 0
TROUBLE SWALLOWING: 0
COUGH: 0

## 2025-02-06 ASSESSMENT — PATIENT HEALTH QUESTIONNAIRE - PHQ9: DEPRESSION UNABLE TO ASSESS: PT REFUSES

## 2025-02-06 NOTE — PROGRESS NOTES
[facility]  [address] [address]  Dept: [phone]  Dept Fax: [phone]    Annalisa Claire is a 49 y.o. female who presents today for her medical conditions/complaintsas noted below.  Annalisa Claire is c/o of   No chief complaint on file.        HPI:     Diabetes   Duration more than 7 years  Modifying factors on Glucophage and other med  Severity uncontrolled sever  Associated signs and symtoms none   aggravated with sugar diet and better with low sugar diet                 Hemoglobin A1C (%)   Date Value   08/07/2024 5.7   01/24/2020 5.5             ( goal A1Cis < 7)   No components found for: \"LABMICR\"  No components found for: \"LDLCHOLESTEROL\", \"LDLCALC\"    (goal LDL is <100)   AST (U/L)   Date Value   06/06/2023 18     ALT (U/L)   Date Value   06/06/2023 18     BUN (mg/dL)   Date Value   05/06/2024 11     BP Readings from Last 3 Encounters:   08/20/24 124/76   07/11/24 126/78   05/14/24 130/83          (goal 120/80)    Past Medical History:   Diagnosis Date    Anxiety 5/3/2013    NO MED STOPPED ZOLOFT DID NOT LIKE SIDE EFFECTS TO FOLLOW WITH DR FOR ALTERNATE MED    Lumbar disc disease     Obesity (BMI 30-39.9) 1/22/2020    Rotator cuff tear 01/19/2013    TO HAVE SURGERY 07/2013, TEAR HAPPENED WHEN BENCH PRESSING WEIGHTS      Past Surgical History:   Procedure Laterality Date    BACK SURGERY      COLONOSCOPY N/A 06/09/2023    COLONOSCOPY POLYPECTOMY SNARE/HOT BIOPSY performed by Anthony Grove MD at [facility] ENDO    LUMBAR DISC SURGERY      L5 herniated disc    SHOULDER ARTHROSCOPY Right 02/05/2020    SHOULDER ARTHROSCOPY ROTATOR CUFF REPAIR, W/INTERSCALENE BLOCK & EXPAREL performed by Braxton Dinh MD at [facility] OR    SHOULDER ARTHROSCOPY Right 02/05/2020    SUBPECTORAL BICEP TENODESIS performed by Braxton Dinh MD at [facility] OR    SHOULDER ARTHROSCOPY Left 5/14/2024    LEFT SHOULDER ARTHROSCOPY SUBSCAPULARIS REPAIR WITH ARTHREX AND PRE-OP INTERSCALENE BLOCK

## 2025-02-27 ENCOUNTER — HOSPITAL ENCOUNTER (OUTPATIENT)
Age: 50
Discharge: HOME | End: 2025-02-27
Payer: COMMERCIAL

## 2025-02-27 DIAGNOSIS — E11.9 TYPE 2 DIABETES MELLITUS WITHOUT COMPLICATION, WITHOUT LONG-TERM CURRENT USE OF INSULIN (HCC): ICD-10-CM

## 2025-02-27 LAB
EST. AVERAGE GLUCOSE BLD GHB EST-MCNC: 117 MG/DL
HBA1C MFR BLD: 5.7 % (ref 4–6)

## 2025-02-27 PROCEDURE — 83036 HEMOGLOBIN GLYCOSYLATED A1C: CPT

## 2025-02-27 PROCEDURE — 36415 COLL VENOUS BLD VENIPUNCTURE: CPT

## 2025-03-04 DIAGNOSIS — E66.01 MORBIDLY OBESE: ICD-10-CM

## 2025-03-04 DIAGNOSIS — E11.9 TYPE 2 DIABETES MELLITUS WITHOUT COMPLICATION, WITHOUT LONG-TERM CURRENT USE OF INSULIN (HCC): Primary | ICD-10-CM

## 2025-03-04 RX ORDER — TIRZEPATIDE 12.5 MG/.5ML
0.5 INJECTION, SOLUTION SUBCUTANEOUS WEEKLY
Qty: 2 ML | Refills: 3 | Status: SHIPPED | OUTPATIENT
Start: 2025-03-04 | End: 2025-03-04 | Stop reason: SDUPTHER

## 2025-03-04 NOTE — TELEPHONE ENCOUNTER
[facility] faxed refill request for Mounjaro   Last office visit 8/20/2024

## 2025-03-04 NOTE — TELEPHONE ENCOUNTER
Diagnosis code is required for medication to be filled per insurance. Please add Dx code and resend to pharmacy.

## 2025-03-05 RX ORDER — TIRZEPATIDE 12.5 MG/.5ML
0.5 INJECTION, SOLUTION SUBCUTANEOUS WEEKLY
Qty: 2 ML | Refills: 3 | Status: SHIPPED | OUTPATIENT
Start: 2025-03-05 | End: 2025-03-06 | Stop reason: SDUPTHER

## 2025-03-06 NOTE — TELEPHONE ENCOUNTER
Medication was sent to the pharmacy with no linked Dx.     Patient has Dx of Obesity and DMII. Rx pending with BOTH Dx attached.   Please review and sign if appropriate.

## 2025-03-07 RX ORDER — TIRZEPATIDE 12.5 MG/.5ML
0.5 INJECTION, SOLUTION SUBCUTANEOUS WEEKLY
Qty: 2 ML | Refills: 3 | Status: SHIPPED | OUTPATIENT
Start: 2025-03-07

## 2025-03-19 DIAGNOSIS — E66.01 MORBIDLY OBESE: ICD-10-CM

## 2025-03-19 DIAGNOSIS — E11.9 TYPE 2 DIABETES MELLITUS WITHOUT COMPLICATION, WITHOUT LONG-TERM CURRENT USE OF INSULIN: ICD-10-CM

## 2025-03-20 RX ORDER — TIRZEPATIDE 12.5 MG/.5ML
0.5 INJECTION, SOLUTION SUBCUTANEOUS WEEKLY
Qty: 2 ML | Refills: 3 | Status: SHIPPED | OUTPATIENT
Start: 2025-03-20

## 2025-05-19 ENCOUNTER — OFFICE VISIT (OUTPATIENT)
Dept: FAMILY MEDICINE CLINIC | Age: 50
End: 2025-05-19
Payer: COMMERCIAL

## 2025-05-19 ENCOUNTER — TELEPHONE (OUTPATIENT)
Dept: INTERNAL MEDICINE CLINIC | Age: 50
End: 2025-05-19

## 2025-05-19 VITALS
SYSTOLIC BLOOD PRESSURE: 136 MMHG | DIASTOLIC BLOOD PRESSURE: 79 MMHG | TEMPERATURE: 97.6 F | HEART RATE: 68 BPM | OXYGEN SATURATION: 98 %

## 2025-05-19 DIAGNOSIS — M54.41 ACUTE RIGHT-SIDED LOW BACK PAIN WITH RIGHT-SIDED SCIATICA: Primary | ICD-10-CM

## 2025-05-19 PROCEDURE — 96372 THER/PROPH/DIAG INJ SC/IM: CPT | Performed by: NURSE PRACTITIONER

## 2025-05-19 PROCEDURE — 99213 OFFICE O/P EST LOW 20 MIN: CPT | Performed by: NURSE PRACTITIONER

## 2025-05-19 RX ORDER — METHYLPREDNISOLONE SODIUM SUCCINATE 125 MG/2ML
125 INJECTION INTRAMUSCULAR; INTRAVENOUS ONCE
Status: COMPLETED | OUTPATIENT
Start: 2025-05-19 | End: 2025-05-19

## 2025-05-19 RX ORDER — PREDNISONE 20 MG/1
20 TABLET ORAL 2 TIMES DAILY
Qty: 10 TABLET | Refills: 0 | Status: SHIPPED | OUTPATIENT
Start: 2025-05-19 | End: 2025-05-24

## 2025-05-19 RX ADMIN — METHYLPREDNISOLONE SODIUM SUCCINATE 125 MG: 125 INJECTION INTRAMUSCULAR; INTRAVENOUS at 16:26

## 2025-05-19 ASSESSMENT — ENCOUNTER SYMPTOMS
ABDOMINAL PAIN: 0
BACK PAIN: 1
BOWEL INCONTINENCE: 0

## 2025-05-19 NOTE — PROGRESS NOTES
[facility]  [address]  [address]  [facility] [address]  Dept: [phone]  Dept Fax: [phone]    Annalisa Claire is a 49 y.o. female who presents to the urgent care today for her medical conditions/complaints as notedbelow.  Annalisa Claire is c/o of Back Pain (Onset started may 10 getting worse back and hip pain sciatica pain.)      HPI:     49 yr old female presents for rt lower back/buttock pain radiating into rt hip.  Hx degeneration rt hip and lumbar spine  Sx started May 10 gradually  Rt sided sciatica  Follows with Dr. Spears for her back and hip  Does not like to take pills  Has been resting and using ice with some relief  Sx are getting better but would like shot steroid and rx oral prednisone  Allergy ibuprofen, causes itchy palms  Denies any injury.  No cauda equina symptoms no fevers or chills  Would like a work note off for total of 3 days  Has had x-rays done in the past of the affected area.  Show degenerative changes.  Has not gone through physical therapy  Back hurts almost every day but this flareup was worse than usual.  NKI      Back Pain  This is a new problem. The current episode started 1 to 4 weeks ago (9 days). The problem occurs constantly. The problem has been gradually improving since onset. The pain is present in the gluteal. Radiates to: rt hip. The pain is moderate. The symptoms are aggravated by bending and position. Stiffness is present In the morning. Pertinent negatives include no abdominal pain, bladder incontinence, bowel incontinence, chest pain, dysuria, fever, headaches, leg pain, numbness, paresis, paresthesias, pelvic pain, perianal numbness, tingling, weakness or weight loss. Risk factors include obesity and menopause. She has tried ice and heat for the symptoms. The treatment provided mild relief.       Past Medical History:   Diagnosis Date    Anxiety 5/3/2013    NO MED STOPPED

## 2025-05-19 NOTE — TELEPHONE ENCOUNTER
Patient requesting refill for next dose increase of Mounjaro 15ml to be sent to [facility]  Last office visit 2/6/2025

## 2025-06-05 ENCOUNTER — TELEPHONE (OUTPATIENT)
Dept: INTERNAL MEDICINE CLINIC | Age: 50
End: 2025-06-05

## 2025-06-06 NOTE — TELEPHONE ENCOUNTER
Mailed no show letter for the date of 6/5/2024 scheduled with Dr Spears  Last office visit 2/6/2025  
Please let her know  No show will lead to dismissal   Please try to attend    
show

## 2025-06-06 NOTE — TELEPHONE ENCOUNTER
Patient is requesting monjaro to be sent to [facility] instead.   Repended medication for your review.

## 2025-06-07 ASSESSMENT — PATIENT HEALTH QUESTIONNAIRE - PHQ9
5. POOR APPETITE OR OVEREATING: NOT AT ALL
4. FEELING TIRED OR HAVING LITTLE ENERGY: SEVERAL DAYS
3. TROUBLE FALLING OR STAYING ASLEEP: NEARLY EVERY DAY
8. MOVING OR SPEAKING SO SLOWLY THAT OTHER PEOPLE COULD HAVE NOTICED. OR THE OPPOSITE, BEING SO FIGETY OR RESTLESS THAT YOU HAVE BEEN MOVING AROUND A LOT MORE THAN USUAL: NOT AT ALL
6. FEELING BAD ABOUT YOURSELF - OR THAT YOU ARE A FAILURE OR HAVE LET YOURSELF OR YOUR FAMILY DOWN: NOT AT ALL
9. THOUGHTS THAT YOU WOULD BE BETTER OFF DEAD, OR OF HURTING YOURSELF: NOT AT ALL
2. FEELING DOWN, DEPRESSED OR HOPELESS: NOT AT ALL
10. IF YOU CHECKED OFF ANY PROBLEMS, HOW DIFFICULT HAVE THESE PROBLEMS MADE IT FOR YOU TO DO YOUR WORK, TAKE CARE OF THINGS AT HOME, OR GET ALONG WITH OTHER PEOPLE: NOT DIFFICULT AT ALL
8. MOVING OR SPEAKING SO SLOWLY THAT OTHER PEOPLE COULD HAVE NOTICED. OR THE OPPOSITE - BEING SO FIDGETY OR RESTLESS THAT YOU HAVE BEEN MOVING AROUND A LOT MORE THAN USUAL: NOT AT ALL
1. LITTLE INTEREST OR PLEASURE IN DOING THINGS: NOT AT ALL
SUM OF ALL RESPONSES TO PHQ QUESTIONS 1-9: 4
5. POOR APPETITE OR OVEREATING: NOT AT ALL
9. THOUGHTS THAT YOU WOULD BE BETTER OFF DEAD, OR OF HURTING YOURSELF: NOT AT ALL
6. FEELING BAD ABOUT YOURSELF - OR THAT YOU ARE A FAILURE OR HAVE LET YOURSELF OR YOUR FAMILY DOWN: NOT AT ALL
SUM OF ALL RESPONSES TO PHQ QUESTIONS 1-9: 4
4. FEELING TIRED OR HAVING LITTLE ENERGY: SEVERAL DAYS
SUM OF ALL RESPONSES TO PHQ QUESTIONS 1-9: 4
7. TROUBLE CONCENTRATING ON THINGS, SUCH AS READING THE NEWSPAPER OR WATCHING TELEVISION: NOT AT ALL
SUM OF ALL RESPONSES TO PHQ QUESTIONS 1-9: 4
3. TROUBLE FALLING OR STAYING ASLEEP: NEARLY EVERY DAY
10. IF YOU CHECKED OFF ANY PROBLEMS, HOW DIFFICULT HAVE THESE PROBLEMS MADE IT FOR YOU TO DO YOUR WORK, TAKE CARE OF THINGS AT HOME, OR GET ALONG WITH OTHER PEOPLE: NOT DIFFICULT AT ALL
2. FEELING DOWN, DEPRESSED OR HOPELESS: NOT AT ALL
1. LITTLE INTEREST OR PLEASURE IN DOING THINGS: NOT AT ALL
7. TROUBLE CONCENTRATING ON THINGS, SUCH AS READING THE NEWSPAPER OR WATCHING TELEVISION: NOT AT ALL
SUM OF ALL RESPONSES TO PHQ QUESTIONS 1-9: 4

## 2025-06-10 ENCOUNTER — OFFICE VISIT (OUTPATIENT)
Dept: INTERNAL MEDICINE CLINIC | Age: 50
End: 2025-06-10
Payer: COMMERCIAL

## 2025-06-10 VITALS
SYSTOLIC BLOOD PRESSURE: 122 MMHG | HEART RATE: 64 BPM | OXYGEN SATURATION: 95 % | BODY MASS INDEX: 38.02 KG/M2 | HEIGHT: 62 IN | WEIGHT: 206.6 LBS | DIASTOLIC BLOOD PRESSURE: 72 MMHG

## 2025-06-10 DIAGNOSIS — E66.01 MORBIDLY OBESE (HCC): ICD-10-CM

## 2025-06-10 DIAGNOSIS — Z13.220 SCREENING FOR HYPERLIPIDEMIA: ICD-10-CM

## 2025-06-10 DIAGNOSIS — Z12.31 SCREENING MAMMOGRAM FOR BREAST CANCER: ICD-10-CM

## 2025-06-10 DIAGNOSIS — E11.9 TYPE 2 DIABETES MELLITUS WITHOUT COMPLICATION, WITHOUT LONG-TERM CURRENT USE OF INSULIN (HCC): Primary | ICD-10-CM

## 2025-06-10 DIAGNOSIS — J30.2 SEASONAL ALLERGIES: ICD-10-CM

## 2025-06-10 PROCEDURE — 3044F HG A1C LEVEL LT 7.0%: CPT | Performed by: INTERNAL MEDICINE

## 2025-06-10 PROCEDURE — 99214 OFFICE O/P EST MOD 30 MIN: CPT | Performed by: INTERNAL MEDICINE

## 2025-06-10 RX ORDER — CETIRIZINE HYDROCHLORIDE 10 MG/1
10 TABLET ORAL DAILY
Qty: 30 TABLET | Refills: 0 | Status: SHIPPED | OUTPATIENT
Start: 2025-06-10

## 2025-06-10 RX ORDER — METHYLPREDNISOLONE 4 MG/1
TABLET ORAL
Qty: 1 KIT | Refills: 0 | Status: SHIPPED | OUTPATIENT
Start: 2025-06-10 | End: 2025-06-16

## 2025-06-10 NOTE — PROGRESS NOTES
[facility]  [address] [address]  Dept: [phone]  Dept Fax: [phone]     Name: Annalisa Claire  : 1975           Chief Complaint   Patient presents with    Hip Pain     Patient said it's better, just achy -     Allergy     Complaint of watery, itchy eyes - asking for something for allergies       History of Present Illness  The patient is here for a 4-month follow-up with allergy complaints, including watery and itchy eyes. She also has right-sided hip pain and back pain. She received a steroid injection for trochanteric bursitis and feels better. Her diabetes is better controlled with an A1c of 5.7. She is taking Mounjaro at the full dose of 15 mg.    She reports experiencing weight loss since her last visit, estimating a reduction of approximately 20 pounds. She acknowledges the need to modify her diet, specifically reducing her intake of rice and black beans.    She has been experiencing symptoms of seasonal allergies, including watery and itchy eyes, for the past 6 months. She has not been using Zyrtec or cetirizine for these symptoms.           Past Medical History:    Past Medical History:   Diagnosis Date    Anxiety 2013    NO MED STOPPED ZOLOFT DID NOT LIKE SIDE EFFECTS TO FOLLOW WITH DR FOR ALTERNATE MED    Chronic back pain     Lumbar disc disease     Obesity (BMI 30-39.9) 2020    Rotator cuff tear 2013    TO HAVE SURGERY 2013, TEAR HAPPENED WHEN BENCH PRESSING WEIGHTS      Reviewed all health maintenance requirements and ordered appropriate tests  Health Maintenance Due   Topic Date Due    HIV screen  Never done    Diabetic retinal exam  Never done    Hepatitis B vaccine (1 of 3 - 19+ 3-dose series) Never done    DTaP/Tdap/Td vaccine (1 - Tdap) Never done    Pneumococcal 50+ years Vaccine (1 of 2 - PCV) Never done    Shingles vaccine (1 of 2) Never done    COVID-19 Vaccine (3 - - season) 2024    GFR test

## 2025-06-10 NOTE — PROGRESS NOTES
Annalisa Claire provided verbal consent for the provider to use the [employer] recording tool during their visit.

## 2025-07-03 ENCOUNTER — TELEPHONE (OUTPATIENT)
Dept: INTERNAL MEDICINE CLINIC | Age: 50
End: 2025-07-03

## (undated) DEVICE — DRESSING,GAUZE,XEROFORM,CURAD,1"X8",ST: Brand: CURAD

## (undated) DEVICE — BANDAGE COBAN 4 IN COMPR W4INXL5YD FOAM COHESIVE QUIK STK SELF ADH SFT

## (undated) DEVICE — ANCHOR BONE SP FBRTAK RC FBRTPE BLK/BLU
Type: IMPLANTABLE DEVICE | Site: SHOULDER | Status: NON-FUNCTIONAL
Removed: 2024-05-14

## (undated) DEVICE — DISC ABSRB YEL FLR POLYETH MGMT SUCT QUICKSUITE

## (undated) DEVICE — ELECTRODE ES L3IN S STL BLDE INSUL DISP VALLEYLAB EDGE

## (undated) DEVICE — COVER,MAYO STAND,STERILE: Brand: MEDLINE

## (undated) DEVICE — MHPB GEN MINOR PACK: Brand: MEDLINE INDUSTRIES, INC.

## (undated) DEVICE — YANKAUER,SMOOTH HANDLE,HIGH CAPACITY: Brand: MEDLINE INDUSTRIES, INC.

## (undated) DEVICE — POUCH FLD 36X29IN SHLDR HVY LO GLARE MATTE FINISH FLM 2 SUCT

## (undated) DEVICE — STRAP,POSITIONING,KNEE/BODY,FOAM,4X60": Brand: MEDLINE

## (undated) DEVICE — APPLICATOR MEDICATED 26 CC SOLUTION HI LT ORNG CHLORAPREP

## (undated) DEVICE — CANNULA ARTHSCP L3CM ID8MM DBL DAM 1 PC MOLD LO PROF FLNG

## (undated) DEVICE — 90-S, SUCTION PROBE, NON-BENDABLE, MAX CUT LEVEL 11: Brand: SERFAS ENERGY

## (undated) DEVICE — CANNULA ARTHSCP L7CM ID8.25MM TRNSLUC THRD FLX W/ NO SQUIRT

## (undated) DEVICE — ERBE NESSY® OMEGA PLATE USA (85+23)CM² , WITH CABLE 3 M: Brand: ERBE

## (undated) DEVICE — SHEET, ORTHO, SPLIT, STERILE: Brand: MEDLINE

## (undated) DEVICE — ASPIRATOR FLR L72IN SUCT TB W/ 1 DETACH FISH STK PUSH HNDL

## (undated) DEVICE — SUTURE SUTTAPE FIBERLINK 1.3MM WHT BLU CLS LOOP AR7535

## (undated) DEVICE — MHPB ARTHROSCOPY PACK: Brand: MEDLINE INDUSTRIES, INC.

## (undated) DEVICE — DEFENDO AIR WATER SUCTION AND BIOPSY VALVE KIT FOR  OLYMPUS: Brand: DEFENDO AIR/WATER/SUCTION AND BIOPSY VALVE

## (undated) DEVICE — SOLUTION IRRIG 3000ML LAC RINGERS ARTHROMTC PLAS CONT

## (undated) DEVICE — STRIP,CLOSURE,WOUND,MEDI-STRIP,1/2X4: Brand: MEDLINE

## (undated) DEVICE — GOWN,SIRUS,POLYRNF,BRTHSLV,XL,30/CS: Brand: MEDLINE

## (undated) DEVICE — 3M™ IOBAN™ 2 ANTIMICROBIAL INCISE DRAPE 6651EZ: Brand: IOBAN™ 2

## (undated) DEVICE — KIT DISP W/ SPEAR TRCR TIP OBT AND 2.6MM DRL FOR 2.6

## (undated) DEVICE — ELECTRODE PT RET AD L9FT HI MOIST COND ADH HYDRGEL CORDED

## (undated) DEVICE — GLOVE ORANGE PI 7 1/2   MSG9075

## (undated) DEVICE — GLOVE ORANGE PI 7   MSG9070

## (undated) DEVICE — DRESSING TRNSPAR W5XL4.5IN FLM SHT SEMIPERMEABLE WIND

## (undated) DEVICE — CANNULA ARTHSCP L4CM DIA8MM PASSPRT BTTN

## (undated) DEVICE — 3M™ STERI-DRAPE™ INSTRUMENT POUCH 1018L: Brand: STERI-DRAPE™

## (undated) DEVICE — GOWN,SIRUS,NONRNF,SETINSLV,XL,20/CS: Brand: MEDLINE

## (undated) DEVICE — PROTECTOR ULN NRV PUR FOAM HK LOOP STRP ANATOMICALLY

## (undated) DEVICE — KIT POS ULT SHLDR PT CARE REHAB SLNG

## (undated) DEVICE — 4-PORT MANIFOLD: Brand: NEPTUNE 2

## (undated) DEVICE — NEEDLE SUT PASS FOR ROT CUF LABRAL REP MULTFI SCORPION

## (undated) DEVICE — GLOVE SURG SZ 8 L12IN THK75MIL DK GRN LTX FREE

## (undated) DEVICE — NEEDLE SCORPION HD MEGA LOADER

## (undated) DEVICE — Device

## (undated) DEVICE — FORCEPS BX L240CM WRK CHN 2.8MM STD CAP W/ NDL MIC MESH

## (undated) DEVICE — SUTURE PROL SZ 3-0 L18IN NONABSORBABLE BLU L24MM FS-1 3/8 8684G

## (undated) DEVICE — DRAPE,U/ SHT,SPLIT,PLAS,STERIL: Brand: MEDLINE

## (undated) DEVICE — SOLUTION IV IRRIG LACTATED RINGERS 3000ML 2B7487

## (undated) DEVICE — YANKAUER,FLEXIBLE HANDLE,REGLR CAPACITY: Brand: MEDLINE INDUSTRIES, INC.

## (undated) DEVICE — SUTURE TIGERTAPE TIGERWIRE SZ 2-0 L30IN NONABSORBABLE AR72377T

## (undated) DEVICE — SUTURE FIBERWIRE SZ 2 W/ TAPERED NEEDLE BLUE L38IN NONABSORB BLU L26.5MM 1/2 CIRCLE AR7200

## (undated) DEVICE — STOCKINETTE,IMPERVIOUS,12X48,STERILE: Brand: MEDLINE

## (undated) DEVICE — GLOVE ORANGE PI 8   MSG9080

## (undated) DEVICE — ENDO KIT W/SYRINGE: Brand: MEDLINE INDUSTRIES, INC.

## (undated) DEVICE — POLYP TRAP: Brand: TRAPEASE®

## (undated) DEVICE — GOWN,AURORA,NONREINFORCED,LARGE: Brand: MEDLINE

## (undated) DEVICE — BLANKET WRM W40.2XL55.9IN IORT LO BODY + MISTRAL AIR

## (undated) DEVICE — DRAPE,REIN 53X77,STERILE: Brand: MEDLINE

## (undated) DEVICE — SUTURE VICRYL + SZ 3-0 L27IN ABSRB UD L26MM SH 1/2 CIR VCP416H

## (undated) DEVICE — 1010 S-DRAPE TOWEL DRAPE 10/BX: Brand: STERI-DRAPE™

## (undated) DEVICE — BLADE SHV L13CM DIA4MM BNE CUT AGG DEB COOLCUT

## (undated) DEVICE — BLADE SAW RESECTOR CUT 4MM

## (undated) DEVICE — SUTURE PROL SZ 3-0 L18IN NONABSORBABLE BLU L30MM FS-1 3/8 8663G

## (undated) DEVICE — PENCIL ES L3M BTTN SWCH HOLSTER W/ BLDE ELECTRD EDGE

## (undated) DEVICE — TUBING PMP L16FT MAIN DISP FOR AR-6400 AR-6475

## (undated) DEVICE — SOLUTION IV IRRIG POUR BRL 0.9% SODIUM CHL 2F7124

## (undated) DEVICE — GAUZE,SPONGE,FLUFF,6"X6.75",STRL,5/TRAY: Brand: MEDLINE

## (undated) DEVICE — 3M™ STERI-DRAPE™ U-DRAPE 1015: Brand: STERI-DRAPE™

## (undated) DEVICE — IMMOBILIZER SHLDR L10.5-17IN D7IN SLNG W/ 15DEG ABD PLLW

## (undated) DEVICE — [ARTHROSCOPY PUMP,  DO NOT USE IF PACKAGE IS DAMAGED,  KEEP DRY,  KEEP AWAY FROM SUNLIGHT,  PROTECT FROM HEAT AND RADIOACTIVE SOURCES.]: Brand: FLOSTEADY

## (undated) DEVICE — SOLUTION IRRIG 1000ML 0.9% SOD CHL USP POUR PLAS BTL

## (undated) DEVICE — KIT CHAIR TRIMANO FOAM W/ SUPP ARM DRP ERGONOMICALLY DESIGNED

## (undated) DEVICE — DRAPE,EXTREMITY,89X128,STERILE: Brand: MEDLINE

## (undated) DEVICE — CANNULA ARTHSCP L7CM DIA7MM TRNSLUC THRD FLX W/ NO SQUIRT

## (undated) DEVICE — SNARE ENDOSCP L240CM LOOP W13MM DIA2.4MM SHT THROW SM OVL

## (undated) DEVICE — SUTURE VICRYL SZ 0 L36IN ABSRB UD L36MM CT-1 1/2 CIR J946H